# Patient Record
Sex: MALE | Race: WHITE | NOT HISPANIC OR LATINO | Employment: OTHER | URBAN - METROPOLITAN AREA
[De-identification: names, ages, dates, MRNs, and addresses within clinical notes are randomized per-mention and may not be internally consistent; named-entity substitution may affect disease eponyms.]

---

## 2018-01-11 NOTE — RESULT NOTES
Verified Results  (1) COMPREHENSIVE METABOLIC PANEL [id number] 46:29LE [facility] Order Number: [id number]     Test Name Result Flag Reference   GLUCOSE,RANDM 95 mg/dL     If the patient is fasting, the ADA then defines impaired fasting glucose as > 100 mg/dL and diabetes as > or equal to 123 mg/dL  SODIUM 137 mmol/L  136-145   POTASSIUM 4 7 mmol/L  3 5-5 3   CHLORIDE 103 mmol/L  100-108   CARBON DIOXIDE 29 mmol/L  21-32   ANION GAP (CALC) 5 mmol/L  4-13   BLOOD UREA NITROGEN 13 mg/dL  5-25   CREATININE 0 97 mg/dL  0 60-1 30   Standardized to IDMS reference method   CALCIUM 8 8 mg/dL  8 3-10 1   BILI, TOTAL 0 60 mg/dL  0 20-1 00   ALK PHOSPHATAS 125 U/L H    ALT (SGPT) 23 U/L  12-78   AST(SGOT) 14 U/L  5-45   ALBUMIN 3 6 g/dL  3 5-5 0   TOTAL PROTEIN 7 1 g/dL  6 4-8 2   eGFR Non-African American      >60 0 ml/min/1 73sq [facility] Disease Education Program recommendations are as follows:  GFR calculation is accurate only with a steady state creatinine  Chronic Kidney disease less than 60 ml/min/1 73 sq  meters  Kidney failure less than 15 ml/min/1 73 sq  meters       (1) CBC/PLT/DIFF [id number] 08:02AM [facility] Order Number: [id number]     Test Name Result Flag Reference   WBC COUNT 7 36 Thousand/uL  4 31-10 16   RBC COUNT 5 08 Million/uL  3 88-5 62   HEMOGLOBIN 15 3 g/dL  12 0-17 0   HEMATOCRIT 46 3 %  36 5-49 3   MCV 91 fL  82-98   MCH 30 1 pg  26 8-34 3   MCHC 33 0 g/dL  31 4-37 4   RDW 13 8 %  11 6-15 1   MPV 11 0 fL  8 9-12 7   PLATELET COUNT 526 Thousands/uL  149-390   nRBC AUTOMATED 0 /100 WBCs     NEUTROPHILS RELATIVE PERCENT 61 %  43-75   LYMPHOCYTES RELATIVE PERCENT 27 %  14-44   MONOCYTES RELATIVE PERCENT 11 %  4-12   EOSINOPHILS RELATIVE PERCENT 1 %  0-6   BASOPHILS RELATIVE PERCENT 0 %  0-1   NEUTROPHILS ABSOLUTE COUNT 4 49 Thousands/?L  1 85-7 62   LYMPHOCYTES ABSOLUTE COUNT 1 95 Thousands/?L  0 60-4 47   MONOCYTES ABSOLUTE COUNT 0 78 Thousand/?L  0 17-1 22   EOSINOPHILS ABSOLUTE COUNT 0 10 Thousand/?L  0 00-0 61   BASOPHILS ABSOLUTE COUNT 0 02 Thousands/?L  0 00-0 10     (1) LIPID PANEL FASTING W DIRECT LDL REFLEX 03Aug2016 08:02AM Mehran Camilla Order Number: [id number]     Test Name Result Flag Reference   CHOLESTEROL 173 mg/dL     LDL CHOLESTEROL CALCULATED 102 mg/dL H 0-100   Triglyceride:         Normal              <150 mg/dl       Borderline High    150-199 mg/dl       High               200-499 mg/dl       Very High          >499 mg/dl  Cholesterol:         Desirable        <200 mg/dl      Borderline High  200-239 mg/dl      High             >239 mg/dl  HDL Cholesterol:        High    >59 mg/dL      Low     <41 mg/dL  LDL Cholesterol:        Optimal          <100 mg/dl        Near Optimal     100-129 mg/dl        Above Optimal          Borderline High   130-159 mg/dl          High              160-189 mg/dl          Very High        >189 mg/dl  LDL CALCULATED:    This screening LDL is a calculated result  It does not have the accuracy of the Direct Measured LDL in the monitoring of patients with hyperlipidemia and/or statin therapy  Direct Measure LDL (NVH942) must be ordered separately in these patients  TRIGLYCERIDES 144 mg/dL  <=150   Specimen collection should occur prior to N-Acetylcysteine or Metamizole administration due to the potential for falsely depressed results  HDL,DIRECT 42 mg/dL  40-60   Specimen collection should occur prior to Metamizole administration due to the potential for falsely depressed results  (1) TSH 03Aug2016 08:02AM [facility] Order Number: [id number]     Test Name Result Flag Reference   TSH 1 240 uIU/mL  0 358-3 740   Patients undergoing fluorescein dye angiography may retain small amounts of fluorescein in the body for 48-72 hours post procedure  Samples containing fluorescein can produce falsely depressed TSH values   If the patient had this procedure,a specimen should be resubmitted post fluorescein clearance       (1) NT- BNP (PRO BRAIN NATRIURETIC PEPTIDE) [id number] 08:02AM Elijah Gell     Test Name Result Flag Reference   NT-PRO  pg/mL H <125

## 2018-04-19 DIAGNOSIS — I10 BENIGN ESSENTIAL HYPERTENSION: Primary | ICD-10-CM

## 2018-04-19 RX ORDER — LISINOPRIL 10 MG/1
TABLET ORAL
Qty: 90 TABLET | Refills: 1 | Status: SHIPPED | OUTPATIENT
Start: 2018-04-19 | End: 2018-08-15 | Stop reason: SDUPTHER

## 2018-07-24 DIAGNOSIS — E78.2 MIXED HYPERLIPIDEMIA: Primary | ICD-10-CM

## 2018-07-24 RX ORDER — SIMVASTATIN 40 MG
TABLET ORAL
Qty: 90 TABLET | Refills: 1 | Status: SHIPPED | OUTPATIENT
Start: 2018-07-24 | End: 2018-08-15 | Stop reason: SDUPTHER

## 2018-08-15 ENCOUNTER — OFFICE VISIT (OUTPATIENT)
Dept: FAMILY MEDICINE CLINIC | Facility: CLINIC | Age: 78
End: 2018-08-15
Payer: MEDICARE

## 2018-08-15 VITALS
HEART RATE: 76 BPM | WEIGHT: 275 LBS | DIASTOLIC BLOOD PRESSURE: 84 MMHG | RESPIRATION RATE: 32 BRPM | HEIGHT: 69 IN | BODY MASS INDEX: 40.73 KG/M2 | SYSTOLIC BLOOD PRESSURE: 122 MMHG

## 2018-08-15 DIAGNOSIS — H26.9 CATARACT OF BOTH EYES, UNSPECIFIED CATARACT TYPE: ICD-10-CM

## 2018-08-15 DIAGNOSIS — Z01.818 PREOPERATIVE EVALUATION OF A MEDICAL CONDITION TO RULE OUT SURGICAL CONTRAINDICATIONS (TAR REQUIRED): Primary | ICD-10-CM

## 2018-08-15 DIAGNOSIS — I10 ESSENTIAL HYPERTENSION: ICD-10-CM

## 2018-08-15 DIAGNOSIS — E78.2 MIXED HYPERLIPIDEMIA: ICD-10-CM

## 2018-08-15 DIAGNOSIS — I10 BENIGN ESSENTIAL HYPERTENSION: ICD-10-CM

## 2018-08-15 PROCEDURE — 99214 OFFICE O/P EST MOD 30 MIN: CPT | Performed by: PHYSICIAN ASSISTANT

## 2018-08-15 RX ORDER — SIMVASTATIN 40 MG
40 TABLET ORAL DAILY
Qty: 90 TABLET | Refills: 3 | Status: SHIPPED | OUTPATIENT
Start: 2018-08-15 | End: 2020-01-01

## 2018-08-15 RX ORDER — TAMSULOSIN HYDROCHLORIDE 0.4 MG/1
CAPSULE ORAL
COMMUNITY
End: 2018-08-15

## 2018-08-15 RX ORDER — FUROSEMIDE 40 MG/1
1 TABLET ORAL DAILY
COMMUNITY
Start: 2016-03-31 | End: 2018-08-15

## 2018-08-15 RX ORDER — LISINOPRIL 10 MG/1
10 TABLET ORAL DAILY
Qty: 90 TABLET | Refills: 3 | Status: SHIPPED | OUTPATIENT
Start: 2018-08-15 | End: 2020-01-01

## 2018-08-15 NOTE — PROGRESS NOTES
Assessment/Plan:  Patient Instructions   1  Preop consultation-EKG was performed because patient had a few skipped beats on auscultation  He did have EKG consistent with PVCs  He is physically stable at this time for procedure as planned with Dr Lakisha Porter on the 20th of August and the 5th of September  2  Cataract-stable for surgery as above  3   Hypertension-stable on lisinopril  Refill given  4   Hyperlipidemia-stable on simvastatin  Refill given  Diagnoses and all orders for this visit:    Preoperative evaluation of a medical condition to rule out surgical contraindications (TAR required)    Cataract of both eyes, unspecified cataract type    Essential hypertension    Mixed hyperlipidemia  -     simvastatin (ZOCOR) 40 mg tablet; Take 1 tablet (40 mg total) by mouth daily    Benign essential hypertension  -     lisinopril (ZESTRIL) 10 mg tablet; Take 1 tablet (10 mg total) by mouth daily          Subjective:   Preop clearance for cataract removal scheduled 8/20 right eye then 9/5  left eye  (+Urinary Incontinence)  [facility]     Patient ID: Jorge Graham is a 69 y o  male  HPI:  This is a 41-year-old gentleman that presents to the office for preop consultation for cataract surgeries to be performed on the 20th of August on the 5th of September  He states that he is feeling well without any symptoms of chest pain or shortness of breath  He has not had any palpitations and denies signs of infection  He has not had fevers, chills, coughing, or diarrhea  He has a history of hypertension which has been stable on lisinopril  His cholesterol has been stable on simvastatin  The following portions of the patient's history were reviewed and updated as appropriate: allergies, current medications, past family history, past medical history, past social history, past surgical history and problem list     Review of Systems   Constitutional: Negative for chills, fatigue and fever     HENT: Negative for congestion, ear pain and sinus pressure  Eyes: Negative for visual disturbance  Respiratory: Negative for cough, chest tightness and shortness of breath  Cardiovascular: Negative for chest pain and palpitations  Gastrointestinal: Negative for diarrhea, nausea and vomiting  Endocrine: Negative for polyuria  Genitourinary: Negative for dysuria and frequency  Musculoskeletal: Negative for arthralgias and myalgias  Skin: Negative for pallor and rash  Neurological: Negative for dizziness, weakness, light-headedness, numbness and headaches  Psychiatric/Behavioral: Negative for agitation, behavioral problems and sleep disturbance  All other systems reviewed and are negative  Objective:      /84   Pulse 76 Comment: irregular  Resp (!) 32   Ht 5' 9" (1 753 m)   Wt 125 kg (275 lb)   BMI 40 61 kg/m²          Physical Exam   Constitutional: He is oriented to person, place, and time  He appears well-developed and well-nourished  No distress  HENT:   Head: Normocephalic and atraumatic  Right Ear: External ear normal    Left Ear: External ear normal    Nose: Nose normal    Mouth/Throat: Oropharynx is clear and moist  No oropharyngeal exudate  Eyes: Conjunctivae and EOM are normal  Pupils are equal, round, and reactive to light  Neck: Normal range of motion  Neck supple  No tracheal deviation present  No thyromegaly present  Cardiovascular: Normal rate, regular rhythm and normal heart sounds  Exam reveals no friction rub  No murmur heard  There appears to be a few skipped beats on auscultation  However there will be several beats in a row that her with a normal rhythm  Pulmonary/Chest: Effort normal and breath sounds normal  No respiratory distress  He has no wheezes  He has no rales  Abdominal: Soft  Bowel sounds are normal  He exhibits no distension  There is no tenderness  There is no rebound and no guarding  Musculoskeletal: Normal range of motion   He exhibits no edema or tenderness  Lymphadenopathy:     He has no cervical adenopathy  Neurological: He is alert and oriented to person, place, and time  No cranial nerve deficit  Coordination normal    Skin: Skin is warm and dry  No rash noted  No erythema  Psychiatric: He has a normal mood and affect  His behavior is normal  Thought content normal    Nursing note and vitals reviewed

## 2018-08-15 NOTE — PATIENT INSTRUCTIONS
1  Preop consultation-EKG was performed because patient had a few skipped beats on auscultation  He did have EKG consistent with PVCs  He is physically stable at this time for procedure as planned with Dr Therese Ni on the 20th of August and the 5th of September  2  Cataract-stable for surgery as above  3   Hypertension-stable on lisinopril  Refill given  4   Hyperlipidemia-stable on simvastatin  Refill given

## 2020-01-01 ENCOUNTER — OFFICE VISIT (OUTPATIENT)
Dept: FAMILY MEDICINE CLINIC | Facility: CLINIC | Age: 80
End: 2020-01-01
Payer: MEDICARE

## 2020-01-01 ENCOUNTER — LAB (OUTPATIENT)
Dept: LAB | Facility: CLINIC | Age: 80
End: 2020-01-01

## 2020-01-01 ENCOUNTER — APPOINTMENT (OUTPATIENT)
Dept: RADIOLOGY | Facility: MEDICAL CENTER | Age: 80
End: 2020-01-01
Payer: MEDICARE

## 2020-01-01 VITALS
WEIGHT: 261 LBS | HEART RATE: 71 BPM | RESPIRATION RATE: 18 BRPM | TEMPERATURE: 97.5 F | OXYGEN SATURATION: 96 % | SYSTOLIC BLOOD PRESSURE: 152 MMHG | DIASTOLIC BLOOD PRESSURE: 90 MMHG | BODY MASS INDEX: 38.54 KG/M2

## 2020-01-01 VITALS
BODY MASS INDEX: 37.73 KG/M2 | DIASTOLIC BLOOD PRESSURE: 98 MMHG | TEMPERATURE: 97.6 F | SYSTOLIC BLOOD PRESSURE: 164 MMHG | WEIGHT: 255.5 LBS | HEART RATE: 68 BPM

## 2020-01-01 VITALS
HEIGHT: 70 IN | HEART RATE: 89 BPM | WEIGHT: 255 LBS | BODY MASS INDEX: 36.51 KG/M2 | TEMPERATURE: 96.3 F | DIASTOLIC BLOOD PRESSURE: 78 MMHG | SYSTOLIC BLOOD PRESSURE: 137 MMHG

## 2020-01-01 DIAGNOSIS — K63.5 BENIGN COLON POLYP: ICD-10-CM

## 2020-01-01 DIAGNOSIS — N30.00 ACUTE CYSTITIS WITHOUT HEMATURIA: Primary | ICD-10-CM

## 2020-01-01 DIAGNOSIS — E78.2 MIXED HYPERLIPIDEMIA: ICD-10-CM

## 2020-01-01 DIAGNOSIS — M16.12 PRIMARY OSTEOARTHRITIS OF LEFT HIP: ICD-10-CM

## 2020-01-01 DIAGNOSIS — N30.00 ACUTE CYSTITIS WITHOUT HEMATURIA: ICD-10-CM

## 2020-01-01 DIAGNOSIS — Z00.00 HEALTH CARE MAINTENANCE: ICD-10-CM

## 2020-01-01 DIAGNOSIS — N39.43 BENIGN PROSTATIC HYPERPLASIA WITH POST-VOID DRIBBLING: ICD-10-CM

## 2020-01-01 DIAGNOSIS — R74.8 ALKALINE PHOSPHATASE ELEVATION: ICD-10-CM

## 2020-01-01 DIAGNOSIS — N40.1 BENIGN PROSTATIC HYPERPLASIA WITH POST-VOID DRIBBLING: ICD-10-CM

## 2020-01-01 DIAGNOSIS — I10 ESSENTIAL HYPERTENSION: Primary | ICD-10-CM

## 2020-01-01 DIAGNOSIS — M25.559 HIP PAIN: ICD-10-CM

## 2020-01-01 DIAGNOSIS — E83.51 HYPOCALCEMIA: Primary | ICD-10-CM

## 2020-01-01 DIAGNOSIS — I10 ESSENTIAL HYPERTENSION: ICD-10-CM

## 2020-01-01 DIAGNOSIS — Z23 ENCOUNTER FOR IMMUNIZATION: ICD-10-CM

## 2020-01-01 DIAGNOSIS — M16.12 PRIMARY OSTEOARTHRITIS OF LEFT HIP: Primary | ICD-10-CM

## 2020-01-01 DIAGNOSIS — M16.0 PRIMARY OSTEOARTHRITIS OF BOTH HIPS: ICD-10-CM

## 2020-01-01 LAB
ALBUMIN SERPL BCP-MCNC: 3.6 G/DL (ref 3.5–5)
ALP SERPL-CCNC: 168 U/L (ref 46–116)
ALT SERPL W P-5'-P-CCNC: 16 U/L (ref 12–78)
AMORPH URATE CRY URNS QL MICRO: ABNORMAL /HPF
ANION GAP SERPL CALCULATED.3IONS-SCNC: 1 MMOL/L (ref 4–13)
AST SERPL W P-5'-P-CCNC: 9 U/L (ref 5–45)
BACTERIA UR CULT: ABNORMAL
BACTERIA UR QL AUTO: ABNORMAL /HPF
BILIRUB SERPL-MCNC: 1 MG/DL (ref 0.2–1)
BILIRUB UR QL STRIP: ABNORMAL
BUN SERPL-MCNC: 10 MG/DL (ref 5–25)
CALCIUM SERPL-MCNC: 7.7 MG/DL (ref 8.3–10.1)
CHLORIDE SERPL-SCNC: 104 MMOL/L (ref 100–108)
CHOLEST SERPL-MCNC: 172 MG/DL (ref 50–200)
CLARITY UR: ABNORMAL
CO2 SERPL-SCNC: 32 MMOL/L (ref 21–32)
COLOR UR: ABNORMAL
CREAT SERPL-MCNC: 0.94 MG/DL (ref 0.6–1.3)
ERYTHROCYTE [DISTWIDTH] IN BLOOD BY AUTOMATED COUNT: 13.8 % (ref 11.6–15.1)
GFR SERPL CREATININE-BSD FRML MDRD: 77 ML/MIN/1.73SQ M
GLUCOSE P FAST SERPL-MCNC: 82 MG/DL (ref 65–99)
GLUCOSE UR STRIP-MCNC: NEGATIVE MG/DL
HCT VFR BLD AUTO: 48.7 % (ref 36.5–49.3)
HDLC SERPL-MCNC: 42 MG/DL
HGB BLD-MCNC: 15.2 G/DL (ref 12–17)
HGB UR QL STRIP.AUTO: NEGATIVE
KETONES UR STRIP-MCNC: NEGATIVE MG/DL
LDLC SERPL CALC-MCNC: 104 MG/DL (ref 0–100)
LEUKOCYTE ESTERASE UR QL STRIP: NEGATIVE
MCH RBC QN AUTO: 28.9 PG (ref 26.8–34.3)
MCHC RBC AUTO-ENTMCNC: 31.2 G/DL (ref 31.4–37.4)
MCV RBC AUTO: 93 FL (ref 82–98)
NITRITE UR QL STRIP: NEGATIVE
NON-SQ EPI CELLS URNS QL MICRO: ABNORMAL /HPF
PH UR STRIP.AUTO: 6 [PH]
PLATELET # BLD AUTO: 217 THOUSANDS/UL (ref 149–390)
PMV BLD AUTO: 10.4 FL (ref 8.9–12.7)
POTASSIUM SERPL-SCNC: 4.1 MMOL/L (ref 3.5–5.3)
PROT SERPL-MCNC: 7.4 G/DL (ref 6.4–8.2)
PROT UR STRIP-MCNC: ABNORMAL MG/DL
PSA SERPL-MCNC: 3.5 NG/ML (ref 0–4)
RBC # BLD AUTO: 5.26 MILLION/UL (ref 3.88–5.62)
RBC #/AREA URNS AUTO: ABNORMAL /HPF
SODIUM SERPL-SCNC: 137 MMOL/L (ref 136–145)
SP GR UR STRIP.AUTO: 1.03 (ref 1–1.03)
TRIGL SERPL-MCNC: 129 MG/DL
TSH SERPL DL<=0.05 MIU/L-ACNC: 1.32 UIU/ML (ref 0.36–3.74)
UROBILINOGEN UR QL STRIP.AUTO: 1 E.U./DL
WBC # BLD AUTO: 7.03 THOUSAND/UL (ref 4.31–10.16)
WBC #/AREA URNS AUTO: ABNORMAL /HPF

## 2020-01-01 PROCEDURE — 99213 OFFICE O/P EST LOW 20 MIN: CPT | Performed by: ORTHOPAEDIC SURGERY

## 2020-01-01 PROCEDURE — 84153 ASSAY OF PSA TOTAL: CPT

## 2020-01-01 PROCEDURE — 81001 URINALYSIS AUTO W/SCOPE: CPT

## 2020-01-01 PROCEDURE — 36415 COLL VENOUS BLD VENIPUNCTURE: CPT

## 2020-01-01 PROCEDURE — 84443 ASSAY THYROID STIM HORMONE: CPT

## 2020-01-01 PROCEDURE — 99214 OFFICE O/P EST MOD 30 MIN: CPT | Performed by: FAMILY MEDICINE

## 2020-01-01 PROCEDURE — G0008 ADMIN INFLUENZA VIRUS VAC: HCPCS | Performed by: FAMILY MEDICINE

## 2020-01-01 PROCEDURE — 73502 X-RAY EXAM HIP UNI 2-3 VIEWS: CPT

## 2020-01-01 PROCEDURE — 90662 IIV NO PRSV INCREASED AG IM: CPT | Performed by: FAMILY MEDICINE

## 2020-01-01 PROCEDURE — 80053 COMPREHEN METABOLIC PANEL: CPT

## 2020-01-01 PROCEDURE — 85027 COMPLETE CBC AUTOMATED: CPT

## 2020-01-01 PROCEDURE — 90732 PPSV23 VACC 2 YRS+ SUBQ/IM: CPT | Performed by: FAMILY MEDICINE

## 2020-01-01 PROCEDURE — G0009 ADMIN PNEUMOCOCCAL VACCINE: HCPCS | Performed by: FAMILY MEDICINE

## 2020-01-01 PROCEDURE — G0439 PPPS, SUBSEQ VISIT: HCPCS | Performed by: FAMILY MEDICINE

## 2020-01-01 PROCEDURE — 87086 URINE CULTURE/COLONY COUNT: CPT

## 2020-01-01 PROCEDURE — 80061 LIPID PANEL: CPT

## 2020-01-01 RX ORDER — ACETAMINOPHEN 325 MG/1
650 TABLET ORAL EVERY 6 HOURS PRN
COMMUNITY
End: 2021-01-01 | Stop reason: HOSPADM

## 2020-01-01 RX ORDER — NITROFURANTOIN 25; 75 MG/1; MG/1
CAPSULE ORAL
Qty: 14 CAPSULE | Refills: 0 | Status: SHIPPED | OUTPATIENT
Start: 2020-01-01 | End: 2021-01-01 | Stop reason: HOSPADM

## 2020-01-01 RX ORDER — TAMSULOSIN HYDROCHLORIDE 0.4 MG/1
CAPSULE ORAL
COMMUNITY
End: 2020-01-01

## 2020-01-01 RX ORDER — TERAZOSIN 5 MG/1
5 CAPSULE ORAL
Qty: 30 CAPSULE | Refills: 5 | Status: SHIPPED | OUTPATIENT
Start: 2020-01-01 | End: 2021-01-01 | Stop reason: HOSPADM

## 2020-01-01 RX ORDER — CALCIUM CARBONATE 200(500)MG
2 TABLET,CHEWABLE ORAL DAILY
COMMUNITY
End: 2021-01-01 | Stop reason: HOSPADM

## 2020-01-01 RX ORDER — TERAZOSIN 2 MG/1
2 CAPSULE ORAL
Qty: 30 CAPSULE | Refills: 5 | Status: SHIPPED | OUTPATIENT
Start: 2020-01-01 | End: 2020-01-01 | Stop reason: SDUPTHER

## 2020-10-14 PROBLEM — Z00.00 HEALTH CARE MAINTENANCE: Status: ACTIVE | Noted: 2020-01-01

## 2020-10-14 PROBLEM — N39.43 BENIGN PROSTATIC HYPERPLASIA WITH POST-VOID DRIBBLING: Status: ACTIVE | Noted: 2020-01-01

## 2020-10-14 PROBLEM — N40.1 BENIGN PROSTATIC HYPERPLASIA WITH POST-VOID DRIBBLING: Status: ACTIVE | Noted: 2020-01-01

## 2020-10-17 PROBLEM — N30.00 ACUTE CYSTITIS WITHOUT HEMATURIA: Status: ACTIVE | Noted: 2020-01-01

## 2020-10-28 PROBLEM — E83.51 HYPOCALCEMIA: Status: ACTIVE | Noted: 2020-01-01

## 2020-10-28 PROBLEM — R74.8 ALKALINE PHOSPHATASE ELEVATION: Status: ACTIVE | Noted: 2020-01-01

## 2021-01-01 ENCOUNTER — APPOINTMENT (INPATIENT)
Dept: RADIOLOGY | Facility: HOSPITAL | Age: 81
DRG: 871 | End: 2021-01-01
Payer: COMMERCIAL

## 2021-01-01 ENCOUNTER — OFFICE VISIT (OUTPATIENT)
Dept: OBGYN CLINIC | Facility: MEDICAL CENTER | Age: 81
End: 2021-01-01
Payer: COMMERCIAL

## 2021-01-01 ENCOUNTER — APPOINTMENT (INPATIENT)
Dept: NON INVASIVE DIAGNOSTICS | Facility: HOSPITAL | Age: 81
DRG: 871 | End: 2021-01-01
Payer: COMMERCIAL

## 2021-01-01 ENCOUNTER — HOSPITAL ENCOUNTER (OUTPATIENT)
Dept: RADIOLOGY | Facility: HOSPITAL | Age: 81
Discharge: HOME | End: 2021-05-14
Payer: COMMERCIAL

## 2021-01-01 ENCOUNTER — APPOINTMENT (INPATIENT)
Dept: RADIOLOGY | Facility: HOSPITAL | Age: 81
DRG: 871 | End: 2021-01-01
Attending: STUDENT IN AN ORGANIZED HEALTH CARE EDUCATION/TRAINING PROGRAM
Payer: COMMERCIAL

## 2021-01-01 ENCOUNTER — TELEPHONE (OUTPATIENT)
Dept: RADIOLOGY | Facility: HOSPITAL | Age: 81
End: 2021-01-01

## 2021-01-01 ENCOUNTER — OFFICE VISIT (OUTPATIENT)
Dept: FAMILY MEDICINE CLINIC | Facility: CLINIC | Age: 81
End: 2021-01-01
Payer: COMMERCIAL

## 2021-01-01 ENCOUNTER — HOSPITAL ENCOUNTER (INPATIENT)
Facility: HOSPITAL | Age: 81
LOS: 8 days | DRG: 871 | End: 2021-09-08
Attending: EMERGENCY MEDICINE | Admitting: STUDENT IN AN ORGANIZED HEALTH CARE EDUCATION/TRAINING PROGRAM
Payer: COMMERCIAL

## 2021-01-01 ENCOUNTER — APPOINTMENT (EMERGENCY)
Dept: RADIOLOGY | Facility: HOSPITAL | Age: 81
DRG: 871 | End: 2021-01-01
Payer: COMMERCIAL

## 2021-01-01 VITALS
TEMPERATURE: 98.9 F | HEIGHT: 70 IN | OXYGEN SATURATION: 90 % | DIASTOLIC BLOOD PRESSURE: 74 MMHG | SYSTOLIC BLOOD PRESSURE: 155 MMHG | RESPIRATION RATE: 22 BRPM | BODY MASS INDEX: 40.94 KG/M2 | WEIGHT: 286 LBS | HEART RATE: 92 BPM

## 2021-01-01 VITALS
BODY MASS INDEX: 40.52 KG/M2 | HEART RATE: 67 BPM | TEMPERATURE: 97.8 F | WEIGHT: 283 LBS | DIASTOLIC BLOOD PRESSURE: 87 MMHG | HEIGHT: 70 IN | SYSTOLIC BLOOD PRESSURE: 151 MMHG

## 2021-01-01 VITALS
HEART RATE: 80 BPM | TEMPERATURE: 98.2 F | SYSTOLIC BLOOD PRESSURE: 156 MMHG | WEIGHT: 283 LBS | BODY MASS INDEX: 40.52 KG/M2 | DIASTOLIC BLOOD PRESSURE: 80 MMHG | HEIGHT: 70 IN

## 2021-01-01 DIAGNOSIS — I63.9 STROKE (CEREBRUM) (HCC): Primary | ICD-10-CM

## 2021-01-01 DIAGNOSIS — R55 SYNCOPE: ICD-10-CM

## 2021-01-01 DIAGNOSIS — I63.81 BASAL GANGLIA STROKE (HCC): ICD-10-CM

## 2021-01-01 DIAGNOSIS — E78.2 MIXED HYPERLIPIDEMIA: ICD-10-CM

## 2021-01-01 DIAGNOSIS — M16.12 PRIMARY OSTEOARTHRITIS OF LEFT HIP: Primary | ICD-10-CM

## 2021-01-01 DIAGNOSIS — R06.02 SHORTNESS OF BREATH: ICD-10-CM

## 2021-01-01 DIAGNOSIS — I63.9 THROMBOEMBOLIC STROKE (HCC): ICD-10-CM

## 2021-01-01 DIAGNOSIS — N40.1 BENIGN PROSTATIC HYPERPLASIA WITH POST-VOID DRIBBLING: ICD-10-CM

## 2021-01-01 DIAGNOSIS — R77.8 ELEVATED TROPONIN: ICD-10-CM

## 2021-01-01 DIAGNOSIS — J39.8 TRACHEOMALACIA: ICD-10-CM

## 2021-01-01 DIAGNOSIS — R53.1 LEFT-SIDED WEAKNESS: ICD-10-CM

## 2021-01-01 DIAGNOSIS — J96.00 ACUTE RESPIRATORY FAILURE, UNSPECIFIED WHETHER WITH HYPOXIA OR HYPERCAPNIA (HCC): ICD-10-CM

## 2021-01-01 DIAGNOSIS — N39.43 BENIGN PROSTATIC HYPERPLASIA WITH POST-VOID DRIBBLING: ICD-10-CM

## 2021-01-01 DIAGNOSIS — M16.0 PRIMARY OSTEOARTHRITIS OF BOTH HIPS: ICD-10-CM

## 2021-01-01 DIAGNOSIS — I10 ESSENTIAL HYPERTENSION: Primary | ICD-10-CM

## 2021-01-01 LAB
ALBUMIN SERPL BCP-MCNC: 2.8 G/DL (ref 3.5–5)
ALBUMIN SERPL BCP-MCNC: 3.4 G/DL (ref 3.5–5)
ALP SERPL-CCNC: 144 U/L (ref 46–116)
ALP SERPL-CCNC: 88 U/L (ref 46–116)
ALT SERPL W P-5'-P-CCNC: 18 U/L (ref 12–78)
ALT SERPL W P-5'-P-CCNC: 20 U/L (ref 12–78)
ANION GAP SERPL CALCULATED.3IONS-SCNC: 1 MMOL/L (ref 4–13)
ANION GAP SERPL CALCULATED.3IONS-SCNC: 1 MMOL/L (ref 4–13)
ANION GAP SERPL CALCULATED.3IONS-SCNC: 3 MMOL/L (ref 4–13)
ANION GAP SERPL CALCULATED.3IONS-SCNC: 7 MMOL/L (ref 4–13)
ANION GAP SERPL CALCULATED.3IONS-SCNC: 8 MMOL/L (ref 4–13)
APTT PPP: 30 SECONDS (ref 23–37)
ARTERIAL PATENCY WRIST A: YES
AST SERPL W P-5'-P-CCNC: 25 U/L (ref 5–45)
AST SERPL W P-5'-P-CCNC: 27 U/L (ref 5–45)
ATRIAL RATE: 66 BPM
ATRIAL RATE: 78 BPM
ATRIAL RATE: 81 BPM
ATRIAL RATE: 97 BPM
BASE EX.OXY STD BLDV CALC-SCNC: 86.2 % (ref 60–80)
BASE EX.OXY STD BLDV CALC-SCNC: 94.4 % (ref 60–80)
BASE EX.OXY STD BLDV CALC-SCNC: 94.4 % (ref 60–80)
BASE EXCESS BLDA CALC-SCNC: 0.6 MMOL/L
BASE EXCESS BLDA CALC-SCNC: 2 MMOL/L (ref -2–3)
BASE EXCESS BLDA CALC-SCNC: 2.1 MMOL/L
BASE EXCESS BLDV CALC-SCNC: -0.6 MMOL/L
BASE EXCESS BLDV CALC-SCNC: -2.8 MMOL/L
BASE EXCESS BLDV CALC-SCNC: 5 MMOL/L
BASOPHILS # BLD AUTO: 0.01 THOUSANDS/ΜL (ref 0–0.1)
BASOPHILS # BLD AUTO: 0.02 THOUSANDS/ΜL (ref 0–0.1)
BASOPHILS # BLD AUTO: 0.03 THOUSANDS/ΜL (ref 0–0.1)
BASOPHILS # BLD AUTO: 0.05 THOUSANDS/ΜL (ref 0–0.1)
BASOPHILS NFR BLD AUTO: 0 % (ref 0–1)
BILIRUB DIRECT SERPL-MCNC: 0.26 MG/DL (ref 0–0.2)
BILIRUB SERPL-MCNC: 0.73 MG/DL (ref 0.2–1)
BILIRUB SERPL-MCNC: 0.86 MG/DL (ref 0.2–1)
BUN SERPL-MCNC: 19 MG/DL (ref 5–25)
BUN SERPL-MCNC: 23 MG/DL (ref 5–25)
BUN SERPL-MCNC: 27 MG/DL (ref 5–25)
BUN SERPL-MCNC: 31 MG/DL (ref 5–25)
BUN SERPL-MCNC: 33 MG/DL (ref 5–25)
BUN SERPL-MCNC: 34 MG/DL (ref 5–25)
BUN SERPL-MCNC: 35 MG/DL (ref 5–25)
BUN SERPL-MCNC: 35 MG/DL (ref 5–25)
BUN SERPL-MCNC: 36 MG/DL (ref 5–25)
BUN SERPL-MCNC: 37 MG/DL (ref 5–25)
CA-I BLD-SCNC: 1.12 MMOL/L (ref 1.12–1.32)
CALCIUM ALBUM COR SERPL-MCNC: 9.1 MG/DL (ref 8.3–10.1)
CALCIUM SERPL-MCNC: 7.6 MG/DL (ref 8.3–10.1)
CALCIUM SERPL-MCNC: 7.6 MG/DL (ref 8.3–10.1)
CALCIUM SERPL-MCNC: 7.8 MG/DL (ref 8.3–10.1)
CALCIUM SERPL-MCNC: 8.2 MG/DL (ref 8.3–10.1)
CALCIUM SERPL-MCNC: 8.3 MG/DL (ref 8.3–10.1)
CALCIUM SERPL-MCNC: 8.6 MG/DL (ref 8.3–10.1)
CALCIUM SERPL-MCNC: 8.7 MG/DL (ref 8.3–10.1)
CALCIUM SERPL-MCNC: 8.9 MG/DL (ref 8.3–10.1)
CHLORIDE SERPL-SCNC: 107 MMOL/L (ref 100–108)
CHLORIDE SERPL-SCNC: 110 MMOL/L (ref 100–108)
CHLORIDE SERPL-SCNC: 110 MMOL/L (ref 100–108)
CHLORIDE SERPL-SCNC: 111 MMOL/L (ref 100–108)
CHLORIDE SERPL-SCNC: 111 MMOL/L (ref 100–108)
CHLORIDE SERPL-SCNC: 112 MMOL/L (ref 100–108)
CHLORIDE SERPL-SCNC: 113 MMOL/L (ref 100–108)
CHLORIDE SERPL-SCNC: 114 MMOL/L (ref 100–108)
CHOLEST SERPL-MCNC: 219 MG/DL (ref 50–200)
CO2 SERPL-SCNC: 22 MMOL/L (ref 21–32)
CO2 SERPL-SCNC: 24 MMOL/L (ref 21–32)
CO2 SERPL-SCNC: 24 MMOL/L (ref 21–32)
CO2 SERPL-SCNC: 25 MMOL/L (ref 21–32)
CO2 SERPL-SCNC: 27 MMOL/L (ref 21–32)
CO2 SERPL-SCNC: 27 MMOL/L (ref 21–32)
CO2 SERPL-SCNC: 29 MMOL/L (ref 21–32)
CO2 SERPL-SCNC: 29 MMOL/L (ref 21–32)
CO2 SERPL-SCNC: 31 MMOL/L (ref 21–32)
CO2 SERPL-SCNC: 32 MMOL/L (ref 21–32)
CREAT SERPL-MCNC: 0.73 MG/DL (ref 0.6–1.3)
CREAT SERPL-MCNC: 0.74 MG/DL (ref 0.6–1.3)
CREAT SERPL-MCNC: 0.79 MG/DL (ref 0.6–1.3)
CREAT SERPL-MCNC: 0.85 MG/DL (ref 0.6–1.3)
CREAT SERPL-MCNC: 0.89 MG/DL (ref 0.6–1.3)
CREAT SERPL-MCNC: 0.91 MG/DL (ref 0.6–1.3)
CREAT SERPL-MCNC: 0.98 MG/DL (ref 0.6–1.3)
CREAT SERPL-MCNC: 0.98 MG/DL (ref 0.6–1.3)
CREAT SERPL-MCNC: 1.06 MG/DL (ref 0.6–1.3)
CREAT SERPL-MCNC: 1.07 MG/DL (ref 0.6–1.3)
D DIMER PPP FEU-MCNC: 0.71 UG/ML FEU
EOSINOPHIL # BLD AUTO: 0 THOUSAND/ΜL (ref 0–0.61)
EOSINOPHIL # BLD AUTO: 0.01 THOUSAND/ΜL (ref 0–0.61)
EOSINOPHIL NFR BLD AUTO: 0 % (ref 0–6)
ERYTHROCYTE [DISTWIDTH] IN BLOOD BY AUTOMATED COUNT: 14.1 % (ref 11.6–15.1)
ERYTHROCYTE [DISTWIDTH] IN BLOOD BY AUTOMATED COUNT: 14.6 % (ref 11.6–15.1)
ERYTHROCYTE [DISTWIDTH] IN BLOOD BY AUTOMATED COUNT: 14.9 % (ref 11.6–15.1)
ERYTHROCYTE [DISTWIDTH] IN BLOOD BY AUTOMATED COUNT: 15 % (ref 11.6–15.1)
ERYTHROCYTE [DISTWIDTH] IN BLOOD BY AUTOMATED COUNT: 15.1 % (ref 11.6–15.1)
EST. AVERAGE GLUCOSE BLD GHB EST-MCNC: 120 MG/DL
FIO2 GAS DIL.REBREATH: 29 L
GFR SERPL CREATININE-BSD FRML MDRD: 66 ML/MIN/1.73SQ M
GFR SERPL CREATININE-BSD FRML MDRD: 66 ML/MIN/1.73SQ M
GFR SERPL CREATININE-BSD FRML MDRD: 73 ML/MIN/1.73SQ M
GFR SERPL CREATININE-BSD FRML MDRD: 73 ML/MIN/1.73SQ M
GFR SERPL CREATININE-BSD FRML MDRD: 80 ML/MIN/1.73SQ M
GFR SERPL CREATININE-BSD FRML MDRD: 81 ML/MIN/1.73SQ M
GFR SERPL CREATININE-BSD FRML MDRD: 83 ML/MIN/1.73SQ M
GFR SERPL CREATININE-BSD FRML MDRD: 85 ML/MIN/1.73SQ M
GFR SERPL CREATININE-BSD FRML MDRD: 88 ML/MIN/1.73SQ M
GFR SERPL CREATININE-BSD FRML MDRD: 88 ML/MIN/1.73SQ M
GLUCOSE SERPL-MCNC: 100 MG/DL (ref 65–140)
GLUCOSE SERPL-MCNC: 102 MG/DL (ref 65–140)
GLUCOSE SERPL-MCNC: 104 MG/DL (ref 65–140)
GLUCOSE SERPL-MCNC: 109 MG/DL (ref 65–140)
GLUCOSE SERPL-MCNC: 112 MG/DL (ref 65–140)
GLUCOSE SERPL-MCNC: 116 MG/DL (ref 65–140)
GLUCOSE SERPL-MCNC: 117 MG/DL (ref 65–140)
GLUCOSE SERPL-MCNC: 117 MG/DL (ref 65–140)
GLUCOSE SERPL-MCNC: 118 MG/DL (ref 65–140)
GLUCOSE SERPL-MCNC: 120 MG/DL (ref 65–140)
GLUCOSE SERPL-MCNC: 120 MG/DL (ref 65–140)
GLUCOSE SERPL-MCNC: 121 MG/DL (ref 65–140)
GLUCOSE SERPL-MCNC: 123 MG/DL (ref 65–140)
GLUCOSE SERPL-MCNC: 124 MG/DL (ref 65–140)
GLUCOSE SERPL-MCNC: 124 MG/DL (ref 65–140)
GLUCOSE SERPL-MCNC: 125 MG/DL (ref 65–140)
GLUCOSE SERPL-MCNC: 127 MG/DL (ref 65–140)
GLUCOSE SERPL-MCNC: 130 MG/DL (ref 65–140)
GLUCOSE SERPL-MCNC: 130 MG/DL (ref 65–140)
GLUCOSE SERPL-MCNC: 131 MG/DL (ref 65–140)
GLUCOSE SERPL-MCNC: 131 MG/DL (ref 65–140)
GLUCOSE SERPL-MCNC: 132 MG/DL (ref 65–140)
GLUCOSE SERPL-MCNC: 134 MG/DL (ref 65–140)
GLUCOSE SERPL-MCNC: 136 MG/DL (ref 65–140)
GLUCOSE SERPL-MCNC: 142 MG/DL (ref 65–140)
GLUCOSE SERPL-MCNC: 145 MG/DL (ref 65–140)
GLUCOSE SERPL-MCNC: 151 MG/DL (ref 65–140)
GLUCOSE SERPL-MCNC: 155 MG/DL (ref 65–140)
GLUCOSE SERPL-MCNC: 168 MG/DL (ref 65–140)
GLUCOSE SERPL-MCNC: 195 MG/DL (ref 65–140)
GLUCOSE SERPL-MCNC: 336 MG/DL (ref 65–140)
GLUCOSE SERPL-MCNC: 96 MG/DL (ref 65–140)
HBA1C MFR BLD: 5.8 %
HCO3 BLDA-SCNC: 24.8 MMOL/L (ref 22–28)
HCO3 BLDA-SCNC: 26.4 MMOL/L (ref 24–30)
HCO3 BLDA-SCNC: 26.8 MMOL/L (ref 22–28)
HCO3 BLDV-SCNC: 21 MMOL/L (ref 24–30)
HCO3 BLDV-SCNC: 23.9 MMOL/L (ref 24–30)
HCO3 BLDV-SCNC: 29.3 MMOL/L (ref 24–30)
HCT VFR BLD AUTO: 40.3 % (ref 36.5–49.3)
HCT VFR BLD AUTO: 41.5 % (ref 36.5–49.3)
HCT VFR BLD AUTO: 44.1 % (ref 36.5–49.3)
HCT VFR BLD AUTO: 45 % (ref 36.5–49.3)
HCT VFR BLD AUTO: 45.1 % (ref 36.5–49.3)
HCT VFR BLD AUTO: 45.2 % (ref 36.5–49.3)
HCT VFR BLD AUTO: 46.2 % (ref 36.5–49.3)
HCT VFR BLD CALC: 42 % (ref 36.5–49.3)
HDLC SERPL-MCNC: 52 MG/DL
HGB BLD-MCNC: 12.7 G/DL (ref 12–17)
HGB BLD-MCNC: 13.2 G/DL (ref 12–17)
HGB BLD-MCNC: 14.4 G/DL (ref 12–17)
HGB BLD-MCNC: 14.5 G/DL (ref 12–17)
HGB BLD-MCNC: 14.5 G/DL (ref 12–17)
HGB BLD-MCNC: 14.7 G/DL (ref 12–17)
HGB BLD-MCNC: 14.9 G/DL (ref 12–17)
HGB BLDA-MCNC: 14.3 G/DL (ref 12–17)
HOROWITZ INDEX BLDA+IHG-RTO: 50 MM[HG]
IMM GRANULOCYTES # BLD AUTO: 0.04 THOUSAND/UL (ref 0–0.2)
IMM GRANULOCYTES # BLD AUTO: 0.09 THOUSAND/UL (ref 0–0.2)
IMM GRANULOCYTES # BLD AUTO: 0.13 THOUSAND/UL (ref 0–0.2)
IMM GRANULOCYTES # BLD AUTO: 0.17 THOUSAND/UL (ref 0–0.2)
IMM GRANULOCYTES NFR BLD AUTO: 0 % (ref 0–2)
IMM GRANULOCYTES NFR BLD AUTO: 1 % (ref 0–2)
INR PPP: 1.08 (ref 0.84–1.19)
IPAP: 16
IPAP: 16
LDLC SERPL CALC-MCNC: 151 MG/DL (ref 0–100)
LYMPHOCYTES # BLD AUTO: 0.41 THOUSANDS/ΜL (ref 0.6–4.47)
LYMPHOCYTES # BLD AUTO: 0.76 THOUSANDS/ΜL (ref 0.6–4.47)
LYMPHOCYTES # BLD AUTO: 0.78 THOUSANDS/ΜL (ref 0.6–4.47)
LYMPHOCYTES # BLD AUTO: 0.91 THOUSANDS/ΜL (ref 0.6–4.47)
LYMPHOCYTES NFR BLD AUTO: 3 % (ref 14–44)
LYMPHOCYTES NFR BLD AUTO: 5 % (ref 14–44)
LYMPHOCYTES NFR BLD AUTO: 6 % (ref 14–44)
LYMPHOCYTES NFR BLD AUTO: 8 % (ref 14–44)
MAGNESIUM SERPL-MCNC: 2.6 MG/DL (ref 1.6–2.6)
MAGNESIUM SERPL-MCNC: 2.8 MG/DL (ref 1.6–2.6)
MAGNESIUM SERPL-MCNC: 2.9 MG/DL (ref 1.6–2.6)
MAGNESIUM SERPL-MCNC: 3 MG/DL (ref 1.6–2.6)
MAGNESIUM SERPL-MCNC: 3.2 MG/DL (ref 1.6–2.6)
MCH RBC QN AUTO: 29.5 PG (ref 26.8–34.3)
MCH RBC QN AUTO: 29.5 PG (ref 26.8–34.3)
MCH RBC QN AUTO: 29.7 PG (ref 26.8–34.3)
MCH RBC QN AUTO: 29.8 PG (ref 26.8–34.3)
MCH RBC QN AUTO: 29.9 PG (ref 26.8–34.3)
MCH RBC QN AUTO: 30.3 PG (ref 26.8–34.3)
MCH RBC QN AUTO: 30.4 PG (ref 26.8–34.3)
MCHC RBC AUTO-ENTMCNC: 31.2 G/DL (ref 31.4–37.4)
MCHC RBC AUTO-ENTMCNC: 31.5 G/DL (ref 31.4–37.4)
MCHC RBC AUTO-ENTMCNC: 31.8 G/DL (ref 31.4–37.4)
MCHC RBC AUTO-ENTMCNC: 32.1 G/DL (ref 31.4–37.4)
MCHC RBC AUTO-ENTMCNC: 32.6 G/DL (ref 31.4–37.4)
MCHC RBC AUTO-ENTMCNC: 32.9 G/DL (ref 31.4–37.4)
MCHC RBC AUTO-ENTMCNC: 33.1 G/DL (ref 31.4–37.4)
MCV RBC AUTO: 90 FL (ref 82–98)
MCV RBC AUTO: 91 FL (ref 82–98)
MCV RBC AUTO: 92 FL (ref 82–98)
MCV RBC AUTO: 94 FL (ref 82–98)
MCV RBC AUTO: 94 FL (ref 82–98)
MCV RBC AUTO: 95 FL (ref 82–98)
MCV RBC AUTO: 95 FL (ref 82–98)
MONOCYTES # BLD AUTO: 0.88 THOUSAND/ΜL (ref 0.17–1.22)
MONOCYTES # BLD AUTO: 0.95 THOUSAND/ΜL (ref 0.17–1.22)
MONOCYTES # BLD AUTO: 1.36 THOUSAND/ΜL (ref 0.17–1.22)
MONOCYTES # BLD AUTO: 1.69 THOUSAND/ΜL (ref 0.17–1.22)
MONOCYTES NFR BLD AUTO: 12 % (ref 4–12)
MONOCYTES NFR BLD AUTO: 12 % (ref 4–12)
MONOCYTES NFR BLD AUTO: 6 % (ref 4–12)
MONOCYTES NFR BLD AUTO: 8 % (ref 4–12)
NEUTROPHILS # BLD AUTO: 10.65 THOUSANDS/ΜL (ref 1.85–7.62)
NEUTROPHILS # BLD AUTO: 11.96 THOUSANDS/ΜL (ref 1.85–7.62)
NEUTROPHILS # BLD AUTO: 12.56 THOUSANDS/ΜL (ref 1.85–7.62)
NEUTROPHILS # BLD AUTO: 8.9 THOUSANDS/ΜL (ref 1.85–7.62)
NEUTS SEG NFR BLD AUTO: 79 % (ref 43–75)
NEUTS SEG NFR BLD AUTO: 82 % (ref 43–75)
NEUTS SEG NFR BLD AUTO: 86 % (ref 43–75)
NEUTS SEG NFR BLD AUTO: 90 % (ref 43–75)
NON VENT- BIPAP: ABNORMAL
NON VENT- BIPAP: ABNORMAL
NONHDLC SERPL-MCNC: 167 MG/DL
NRBC BLD AUTO-RTO: 0 /100 WBCS
NT-PROBNP SERPL-MCNC: 673 PG/ML
O2 CT BLDA-SCNC: 20 ML/DL (ref 16–23)
O2 CT BLDA-SCNC: 20.1 ML/DL (ref 16–23)
O2 CT BLDV-SCNC: 18.9 ML/DL
O2 CT BLDV-SCNC: 19 ML/DL
O2 CT BLDV-SCNC: 20.3 ML/DL
OXYHGB MFR BLDA: 97.4 % (ref 94–97)
OXYHGB MFR BLDA: 98.2 % (ref 94–97)
P AXIS: -80 DEGREES
P AXIS: 178 DEGREES
P AXIS: 82 DEGREES
PCO2 BLD: 28 MMOL/L (ref 21–32)
PCO2 BLD: 37.7 MM HG (ref 42–50)
PCO2 BLDA: 38.4 MM HG (ref 36–44)
PCO2 BLDA: 42.3 MM HG (ref 36–44)
PCO2 BLDV: 34.2 MM HG (ref 42–50)
PCO2 BLDV: 38.9 MM HG (ref 42–50)
PCO2 BLDV: 42.3 MM HG (ref 42–50)
PEEP MAX SETTING VENT: 6 CM[H2O]
PEEP MAX SETTING VENT: 6 CM[H2O]
PEEP RESPIRATORY: 6 CM[H2O]
PH BLD: 7.45 [PH] (ref 7.3–7.4)
PH BLDA: 7.42 [PH] (ref 7.35–7.45)
PH BLDA: 7.43 [PH] (ref 7.35–7.45)
PH BLDV: 7.41 [PH] (ref 7.3–7.4)
PH BLDV: 7.41 [PH] (ref 7.3–7.4)
PH BLDV: 7.46 [PH] (ref 7.3–7.4)
PHOSPHATE SERPL-MCNC: 2.3 MG/DL (ref 2.3–4.1)
PHOSPHATE SERPL-MCNC: 2.5 MG/DL (ref 2.3–4.1)
PHOSPHATE SERPL-MCNC: 2.7 MG/DL (ref 2.3–4.1)
PHOSPHATE SERPL-MCNC: 2.9 MG/DL (ref 2.3–4.1)
PHOSPHATE SERPL-MCNC: 3 MG/DL (ref 2.3–4.1)
PLATELET # BLD AUTO: 183 THOUSANDS/UL (ref 149–390)
PLATELET # BLD AUTO: 221 THOUSANDS/UL (ref 149–390)
PLATELET # BLD AUTO: 224 THOUSANDS/UL (ref 149–390)
PLATELET # BLD AUTO: 236 THOUSANDS/UL (ref 149–390)
PLATELET # BLD AUTO: 256 THOUSANDS/UL (ref 149–390)
PLATELET # BLD AUTO: 264 THOUSANDS/UL (ref 149–390)
PLATELET # BLD AUTO: 273 THOUSANDS/UL (ref 149–390)
PMV BLD AUTO: 10.3 FL (ref 8.9–12.7)
PMV BLD AUTO: 10.4 FL (ref 8.9–12.7)
PMV BLD AUTO: 10.4 FL (ref 8.9–12.7)
PMV BLD AUTO: 10.5 FL (ref 8.9–12.7)
PMV BLD AUTO: 11.1 FL (ref 8.9–12.7)
PO2 BLD: 49 MM HG (ref 35–45)
PO2 BLDA: 110.5 MM HG (ref 75–129)
PO2 BLDA: 145.6 MM HG (ref 75–129)
PO2 BLDV: 54.9 MM HG (ref 35–45)
PO2 BLDV: 73.4 MM HG (ref 35–45)
PO2 BLDV: 78.2 MM HG (ref 35–45)
POTASSIUM BLD-SCNC: 3.9 MMOL/L (ref 3.5–5.3)
POTASSIUM SERPL-SCNC: 3.4 MMOL/L (ref 3.5–5.3)
POTASSIUM SERPL-SCNC: 3.6 MMOL/L (ref 3.5–5.3)
POTASSIUM SERPL-SCNC: 3.7 MMOL/L (ref 3.5–5.3)
POTASSIUM SERPL-SCNC: 3.8 MMOL/L (ref 3.5–5.3)
POTASSIUM SERPL-SCNC: 3.9 MMOL/L (ref 3.5–5.3)
POTASSIUM SERPL-SCNC: 4 MMOL/L (ref 3.5–5.3)
POTASSIUM SERPL-SCNC: 4.3 MMOL/L (ref 3.5–5.3)
POTASSIUM SERPL-SCNC: 4.4 MMOL/L (ref 3.5–5.3)
PR INTERVAL: 1024 MS
PR INTERVAL: 138 MS
PROCALCITONIN SERPL-MCNC: 0.08 NG/ML
PROCALCITONIN SERPL-MCNC: 0.11 NG/ML
PROCALCITONIN SERPL-MCNC: 0.12 NG/ML
PROCALCITONIN SERPL-MCNC: <0.05 NG/ML
PROT SERPL-MCNC: 6.1 G/DL (ref 6.4–8.2)
PROT SERPL-MCNC: 7.6 G/DL (ref 6.4–8.2)
PROTHROMBIN TIME: 13.5 SECONDS (ref 11.6–14.5)
QRS AXIS: -10 DEGREES
QRS AXIS: -29 DEGREES
QRS AXIS: -30 DEGREES
QRS AXIS: -6 DEGREES
QRSD INTERVAL: 124 MS
QRSD INTERVAL: 126 MS
QRSD INTERVAL: 142 MS
QRSD INTERVAL: 144 MS
QT INTERVAL: 370 MS
QT INTERVAL: 378 MS
QT INTERVAL: 420 MS
QT INTERVAL: 429 MS
QTC INTERVAL: 421 MS
QTC INTERVAL: 449 MS
QTC INTERVAL: 450 MS
QTC INTERVAL: 487 MS
RBC # BLD AUTO: 4.3 MILLION/UL (ref 3.88–5.62)
RBC # BLD AUTO: 4.41 MILLION/UL (ref 3.88–5.62)
RBC # BLD AUTO: 4.77 MILLION/UL (ref 3.88–5.62)
RBC # BLD AUTO: 4.78 MILLION/UL (ref 3.88–5.62)
RBC # BLD AUTO: 4.88 MILLION/UL (ref 3.88–5.62)
RBC # BLD AUTO: 4.94 MILLION/UL (ref 3.88–5.62)
RBC # BLD AUTO: 5.01 MILLION/UL (ref 3.88–5.62)
SAO2 % BLD FROM PO2: 86 % (ref 60–85)
SARS-COV-2 RNA RESP QL NAA+PROBE: NEGATIVE
SODIUM BLD-SCNC: 143 MMOL/L (ref 136–145)
SODIUM SERPL-SCNC: 139 MMOL/L (ref 136–145)
SODIUM SERPL-SCNC: 140 MMOL/L (ref 136–145)
SODIUM SERPL-SCNC: 141 MMOL/L (ref 136–145)
SODIUM SERPL-SCNC: 142 MMOL/L (ref 136–145)
SODIUM SERPL-SCNC: 143 MMOL/L (ref 136–145)
SODIUM SERPL-SCNC: 143 MMOL/L (ref 136–145)
SODIUM SERPL-SCNC: 144 MMOL/L (ref 136–145)
SODIUM SERPL-SCNC: 145 MMOL/L (ref 136–145)
SPECIMEN SOURCE: ABNORMAL
T WAVE AXIS: 17 DEGREES
T WAVE AXIS: 26 DEGREES
T WAVE AXIS: 46 DEGREES
T WAVE AXIS: 85 DEGREES
TRIGL SERPL-MCNC: 80 MG/DL
TROPONIN I SERPL-MCNC: 0.05 NG/ML
TROPONIN I SERPL-MCNC: 0.05 NG/ML
TROPONIN I SERPL-MCNC: 0.09 NG/ML
TROPONIN I SERPL-MCNC: 0.12 NG/ML
TSH SERPL DL<=0.05 MIU/L-ACNC: 1.81 UIU/ML (ref 0.36–3.74)
VENT AC: 16
VENT BIPAP FIO2: 40 %
VENT BIPAP FIO2: 40 %
VENT- AC: AC
VENTRICULAR RATE: 66 BPM
VENTRICULAR RATE: 78 BPM
VENTRICULAR RATE: 81 BPM
VENTRICULAR RATE: 85 BPM
VT SETTING VENT: 500 ML
WBC # BLD AUTO: 10.17 THOUSAND/UL (ref 4.31–10.16)
WBC # BLD AUTO: 10.83 THOUSAND/UL (ref 4.31–10.16)
WBC # BLD AUTO: 11.34 THOUSAND/UL (ref 4.31–10.16)
WBC # BLD AUTO: 12.44 THOUSAND/UL (ref 4.31–10.16)
WBC # BLD AUTO: 13.95 THOUSAND/UL (ref 4.31–10.16)
WBC # BLD AUTO: 14.63 THOUSAND/UL (ref 4.31–10.16)
WBC # BLD AUTO: 15.92 THOUSAND/UL (ref 4.31–10.16)

## 2021-01-01 PROCEDURE — 99231 SBSQ HOSP IP/OBS SF/LOW 25: CPT | Performed by: PHYSICIAN ASSISTANT

## 2021-01-01 PROCEDURE — 80048 BASIC METABOLIC PNL TOTAL CA: CPT | Performed by: STUDENT IN AN ORGANIZED HEALTH CARE EDUCATION/TRAINING PROGRAM

## 2021-01-01 PROCEDURE — G1004 CDSM NDSC: HCPCS

## 2021-01-01 PROCEDURE — 93010 ELECTROCARDIOGRAM REPORT: CPT | Performed by: INTERNAL MEDICINE

## 2021-01-01 PROCEDURE — 99291 CRITICAL CARE FIRST HOUR: CPT | Performed by: EMERGENCY MEDICINE

## 2021-01-01 PROCEDURE — 82948 REAGENT STRIP/BLOOD GLUCOSE: CPT

## 2021-01-01 PROCEDURE — NC001 PR NO CHARGE: Performed by: EMERGENCY MEDICINE

## 2021-01-01 PROCEDURE — 36600 WITHDRAWAL OF ARTERIAL BLOOD: CPT

## 2021-01-01 PROCEDURE — 93306 TTE W/DOPPLER COMPLETE: CPT

## 2021-01-01 PROCEDURE — 94669 MECHANICAL CHEST WALL OSCILL: CPT

## 2021-01-01 PROCEDURE — 94002 VENT MGMT INPAT INIT DAY: CPT

## 2021-01-01 PROCEDURE — 70498 CT ANGIOGRAPHY NECK: CPT

## 2021-01-01 PROCEDURE — 31500 INSERT EMERGENCY AIRWAY: CPT

## 2021-01-01 PROCEDURE — 84100 ASSAY OF PHOSPHORUS: CPT | Performed by: STUDENT IN AN ORGANIZED HEALTH CARE EDUCATION/TRAINING PROGRAM

## 2021-01-01 PROCEDURE — 94003 VENT MGMT INPAT SUBQ DAY: CPT

## 2021-01-01 PROCEDURE — 85025 COMPLETE CBC W/AUTO DIFF WBC: CPT | Performed by: NURSE PRACTITIONER

## 2021-01-01 PROCEDURE — 70450 CT HEAD/BRAIN W/O DYE: CPT

## 2021-01-01 PROCEDURE — 99232 SBSQ HOSP IP/OBS MODERATE 35: CPT | Performed by: PHYSICIAN ASSISTANT

## 2021-01-01 PROCEDURE — 94640 AIRWAY INHALATION TREATMENT: CPT

## 2021-01-01 PROCEDURE — 99497 ADVNCD CARE PLAN 30 MIN: CPT | Performed by: STUDENT IN AN ORGANIZED HEALTH CARE EDUCATION/TRAINING PROGRAM

## 2021-01-01 PROCEDURE — 71045 X-RAY EXAM CHEST 1 VIEW: CPT

## 2021-01-01 PROCEDURE — 99222 1ST HOSP IP/OBS MODERATE 55: CPT | Performed by: INTERNAL MEDICINE

## 2021-01-01 PROCEDURE — 99285 EMERGENCY DEPT VISIT HI MDM: CPT

## 2021-01-01 PROCEDURE — 99233 SBSQ HOSP IP/OBS HIGH 50: CPT | Performed by: INTERNAL MEDICINE

## 2021-01-01 PROCEDURE — 84145 PROCALCITONIN (PCT): CPT | Performed by: NURSE PRACTITIONER

## 2021-01-01 PROCEDURE — 85027 COMPLETE CBC AUTOMATED: CPT | Performed by: STUDENT IN AN ORGANIZED HEALTH CARE EDUCATION/TRAINING PROGRAM

## 2021-01-01 PROCEDURE — 99498 ADVNCD CARE PLAN ADDL 30 MIN: CPT | Performed by: STUDENT IN AN ORGANIZED HEALTH CARE EDUCATION/TRAINING PROGRAM

## 2021-01-01 PROCEDURE — 85025 COMPLETE CBC W/AUTO DIFF WBC: CPT | Performed by: EMERGENCY MEDICINE

## 2021-01-01 PROCEDURE — 3079F DIAST BP 80-89 MM HG: CPT | Performed by: ORTHOPAEDIC SURGERY

## 2021-01-01 PROCEDURE — 80053 COMPREHEN METABOLIC PANEL: CPT | Performed by: EMERGENCY MEDICINE

## 2021-01-01 PROCEDURE — 5A1945Z RESPIRATORY VENTILATION, 24-96 CONSECUTIVE HOURS: ICD-10-PCS | Performed by: STUDENT IN AN ORGANIZED HEALTH CARE EDUCATION/TRAINING PROGRAM

## 2021-01-01 PROCEDURE — 82805 BLOOD GASES W/O2 SATURATION: CPT

## 2021-01-01 PROCEDURE — 99233 SBSQ HOSP IP/OBS HIGH 50: CPT | Performed by: STUDENT IN AN ORGANIZED HEALTH CARE EDUCATION/TRAINING PROGRAM

## 2021-01-01 PROCEDURE — 99232 SBSQ HOSP IP/OBS MODERATE 35: CPT | Performed by: STUDENT IN AN ORGANIZED HEALTH CARE EDUCATION/TRAINING PROGRAM

## 2021-01-01 PROCEDURE — 80048 BASIC METABOLIC PNL TOTAL CA: CPT

## 2021-01-01 PROCEDURE — 93005 ELECTROCARDIOGRAM TRACING: CPT

## 2021-01-01 PROCEDURE — 94760 N-INVAS EAR/PLS OXIMETRY 1: CPT | Performed by: SOCIAL WORKER

## 2021-01-01 PROCEDURE — U0003 INFECTIOUS AGENT DETECTION BY NUCLEIC ACID (DNA OR RNA); SEVERE ACUTE RESPIRATORY SYNDROME CORONAVIRUS 2 (SARS-COV-2) (CORONAVIRUS DISEASE [COVID-19]), AMPLIFIED PROBE TECHNIQUE, MAKING USE OF HIGH THROUGHPUT TECHNOLOGIES AS DESCRIBED BY CMS-2020-01-R: HCPCS | Performed by: EMERGENCY MEDICINE

## 2021-01-01 PROCEDURE — 80048 BASIC METABOLIC PNL TOTAL CA: CPT | Performed by: REGISTERED NURSE

## 2021-01-01 PROCEDURE — 85014 HEMATOCRIT: CPT

## 2021-01-01 PROCEDURE — 36415 COLL VENOUS BLD VENIPUNCTURE: CPT | Performed by: EMERGENCY MEDICINE

## 2021-01-01 PROCEDURE — 99291 CRITICAL CARE FIRST HOUR: CPT | Performed by: PSYCHIATRY & NEUROLOGY

## 2021-01-01 PROCEDURE — 82805 BLOOD GASES W/O2 SATURATION: CPT | Performed by: STUDENT IN AN ORGANIZED HEALTH CARE EDUCATION/TRAINING PROGRAM

## 2021-01-01 PROCEDURE — 71046 X-RAY EXAM CHEST 2 VIEWS: CPT

## 2021-01-01 PROCEDURE — 83735 ASSAY OF MAGNESIUM: CPT | Performed by: STUDENT IN AN ORGANIZED HEALTH CARE EDUCATION/TRAINING PROGRAM

## 2021-01-01 PROCEDURE — 94760 N-INVAS EAR/PLS OXIMETRY 1: CPT

## 2021-01-01 PROCEDURE — 99233 SBSQ HOSP IP/OBS HIGH 50: CPT | Performed by: EMERGENCY MEDICINE

## 2021-01-01 PROCEDURE — 3079F DIAST BP 80-89 MM HG: CPT | Performed by: PHYSICIAN ASSISTANT

## 2021-01-01 PROCEDURE — 93306 TTE W/DOPPLER COMPLETE: CPT | Performed by: INTERNAL MEDICINE

## 2021-01-01 PROCEDURE — 80076 HEPATIC FUNCTION PANEL: CPT

## 2021-01-01 PROCEDURE — 3077F SYST BP >= 140 MM HG: CPT | Performed by: ORTHOPAEDIC SURGERY

## 2021-01-01 PROCEDURE — 72125 CT NECK SPINE W/O DYE: CPT

## 2021-01-01 PROCEDURE — 85379 FIBRIN DEGRADATION QUANT: CPT | Performed by: EMERGENCY MEDICINE

## 2021-01-01 PROCEDURE — 3077F SYST BP >= 140 MM HG: CPT | Performed by: PHYSICIAN ASSISTANT

## 2021-01-01 PROCEDURE — 84100 ASSAY OF PHOSPHORUS: CPT

## 2021-01-01 PROCEDURE — 84132 ASSAY OF SERUM POTASSIUM: CPT

## 2021-01-01 PROCEDURE — 1160F RVW MEDS BY RX/DR IN RCRD: CPT | Performed by: PHYSICIAN ASSISTANT

## 2021-01-01 PROCEDURE — 80048 BASIC METABOLIC PNL TOTAL CA: CPT | Performed by: NURSE PRACTITIONER

## 2021-01-01 PROCEDURE — 84145 PROCALCITONIN (PCT): CPT | Performed by: PHYSICIAN ASSISTANT

## 2021-01-01 PROCEDURE — 84443 ASSAY THYROID STIM HORMONE: CPT

## 2021-01-01 PROCEDURE — 82805 BLOOD GASES W/O2 SATURATION: CPT | Performed by: EMERGENCY MEDICINE

## 2021-01-01 PROCEDURE — 71275 CT ANGIOGRAPHY CHEST: CPT

## 2021-01-01 PROCEDURE — C9113 INJ PANTOPRAZOLE SODIUM, VIA: HCPCS

## 2021-01-01 PROCEDURE — 99223 1ST HOSP IP/OBS HIGH 75: CPT | Performed by: INTERNAL MEDICINE

## 2021-01-01 PROCEDURE — 82947 ASSAY GLUCOSE BLOOD QUANT: CPT

## 2021-01-01 PROCEDURE — 85610 PROTHROMBIN TIME: CPT | Performed by: INTERNAL MEDICINE

## 2021-01-01 PROCEDURE — 84484 ASSAY OF TROPONIN QUANT: CPT | Performed by: EMERGENCY MEDICINE

## 2021-01-01 PROCEDURE — 84484 ASSAY OF TROPONIN QUANT: CPT

## 2021-01-01 PROCEDURE — 94664 DEMO&/EVAL PT USE INHALER: CPT

## 2021-01-01 PROCEDURE — 99232 SBSQ HOSP IP/OBS MODERATE 35: CPT | Performed by: INTERNAL MEDICINE

## 2021-01-01 PROCEDURE — 83036 HEMOGLOBIN GLYCOSYLATED A1C: CPT

## 2021-01-01 PROCEDURE — 82803 BLOOD GASES ANY COMBINATION: CPT

## 2021-01-01 PROCEDURE — 83735 ASSAY OF MAGNESIUM: CPT | Performed by: REGISTERED NURSE

## 2021-01-01 PROCEDURE — 99232 SBSQ HOSP IP/OBS MODERATE 35: CPT | Performed by: PSYCHIATRY & NEUROLOGY

## 2021-01-01 PROCEDURE — 70496 CT ANGIOGRAPHY HEAD: CPT

## 2021-01-01 PROCEDURE — U0005 INFEC AGEN DETEC AMPLI PROBE: HCPCS | Performed by: EMERGENCY MEDICINE

## 2021-01-01 PROCEDURE — 99223 1ST HOSP IP/OBS HIGH 75: CPT | Performed by: PHYSICAL MEDICINE & REHABILITATION

## 2021-01-01 PROCEDURE — 83880 ASSAY OF NATRIURETIC PEPTIDE: CPT | Performed by: INTERNAL MEDICINE

## 2021-01-01 PROCEDURE — 1036F TOBACCO NON-USER: CPT | Performed by: ORTHOPAEDIC SURGERY

## 2021-01-01 PROCEDURE — 85730 THROMBOPLASTIN TIME PARTIAL: CPT | Performed by: INTERNAL MEDICINE

## 2021-01-01 PROCEDURE — 99213 OFFICE O/P EST LOW 20 MIN: CPT | Performed by: ORTHOPAEDIC SURGERY

## 2021-01-01 PROCEDURE — 94640 AIRWAY INHALATION TREATMENT: CPT | Performed by: SOCIAL WORKER

## 2021-01-01 PROCEDURE — 99285 EMERGENCY DEPT VISIT HI MDM: CPT | Performed by: EMERGENCY MEDICINE

## 2021-01-01 PROCEDURE — 0BH18EZ INSERTION OF ENDOTRACHEAL AIRWAY INTO TRACHEA, VIA NATURAL OR ARTIFICIAL OPENING ENDOSCOPIC: ICD-10-PCS | Performed by: STUDENT IN AN ORGANIZED HEALTH CARE EDUCATION/TRAINING PROGRAM

## 2021-01-01 PROCEDURE — 1036F TOBACCO NON-USER: CPT | Performed by: PHYSICIAN ASSISTANT

## 2021-01-01 PROCEDURE — 82330 ASSAY OF CALCIUM: CPT | Performed by: REGISTERED NURSE

## 2021-01-01 PROCEDURE — 99233 SBSQ HOSP IP/OBS HIGH 50: CPT | Performed by: NURSE PRACTITIONER

## 2021-01-01 PROCEDURE — 70551 MRI BRAIN STEM W/O DYE: CPT

## 2021-01-01 PROCEDURE — 85025 COMPLETE CBC W/AUTO DIFF WBC: CPT | Performed by: REGISTERED NURSE

## 2021-01-01 PROCEDURE — C9113 INJ PANTOPRAZOLE SODIUM, VIA: HCPCS | Performed by: STUDENT IN AN ORGANIZED HEALTH CARE EDUCATION/TRAINING PROGRAM

## 2021-01-01 PROCEDURE — 83735 ASSAY OF MAGNESIUM: CPT

## 2021-01-01 PROCEDURE — 84484 ASSAY OF TROPONIN QUANT: CPT | Performed by: PHYSICIAN ASSISTANT

## 2021-01-01 PROCEDURE — NC001 PR NO CHARGE: Performed by: PHYSICIAN ASSISTANT

## 2021-01-01 PROCEDURE — 3E03317 INTRODUCTION OF OTHER THROMBOLYTIC INTO PERIPHERAL VEIN, PERCUTANEOUS APPROACH: ICD-10-PCS | Performed by: PHYSICIAN ASSISTANT

## 2021-01-01 PROCEDURE — C9113 INJ PANTOPRAZOLE SODIUM, VIA: HCPCS | Performed by: REGISTERED NURSE

## 2021-01-01 PROCEDURE — 82805 BLOOD GASES W/O2 SATURATION: CPT | Performed by: NURSE PRACTITIONER

## 2021-01-01 PROCEDURE — 80061 LIPID PANEL: CPT | Performed by: PHYSICIAN ASSISTANT

## 2021-01-01 PROCEDURE — 99214 OFFICE O/P EST MOD 30 MIN: CPT | Performed by: PHYSICIAN ASSISTANT

## 2021-01-01 PROCEDURE — 84295 ASSAY OF SERUM SODIUM: CPT

## 2021-01-01 PROCEDURE — 80048 BASIC METABOLIC PNL TOTAL CA: CPT | Performed by: PHYSICIAN ASSISTANT

## 2021-01-01 PROCEDURE — 93970 EXTREMITY STUDY: CPT | Performed by: SURGERY

## 2021-01-01 PROCEDURE — 74018 RADEX ABDOMEN 1 VIEW: CPT

## 2021-01-01 PROCEDURE — 99291 CRITICAL CARE FIRST HOUR: CPT | Performed by: NURSE PRACTITIONER

## 2021-01-01 PROCEDURE — 93970 EXTREMITY STUDY: CPT

## 2021-01-01 PROCEDURE — 99292 CRITICAL CARE ADDL 30 MIN: CPT | Performed by: EMERGENCY MEDICINE

## 2021-01-01 PROCEDURE — 99232 SBSQ HOSP IP/OBS MODERATE 35: CPT | Performed by: NURSE PRACTITIONER

## 2021-01-01 RX ORDER — HYDRALAZINE HYDROCHLORIDE 20 MG/ML
5 INJECTION INTRAMUSCULAR; INTRAVENOUS EVERY 6 HOURS PRN
Status: DISCONTINUED | OUTPATIENT
Start: 2021-01-01 | End: 2021-01-01

## 2021-01-01 RX ORDER — SENNOSIDES 8.8 MG/5ML
8.8 LIQUID ORAL
Status: DISCONTINUED | OUTPATIENT
Start: 2021-01-01 | End: 2021-01-01

## 2021-01-01 RX ORDER — LEVALBUTEROL 1.25 MG/.5ML
1.25 SOLUTION, CONCENTRATE RESPIRATORY (INHALATION)
Status: DISCONTINUED | OUTPATIENT
Start: 2021-01-01 | End: 2021-01-01

## 2021-01-01 RX ORDER — ATORVASTATIN CALCIUM 40 MG/1
40 TABLET, FILM COATED ORAL
Status: DISCONTINUED | OUTPATIENT
Start: 2021-01-01 | End: 2021-01-01

## 2021-01-01 RX ORDER — SENNOSIDES 8.8 MG/5ML
8.8 LIQUID ORAL 2 TIMES DAILY
Status: DISCONTINUED | OUTPATIENT
Start: 2021-01-01 | End: 2021-01-01

## 2021-01-01 RX ORDER — POLYETHYLENE GLYCOL 3350 17 G/17G
17 POWDER, FOR SOLUTION ORAL DAILY
Status: DISCONTINUED | OUTPATIENT
Start: 2021-01-01 | End: 2021-01-01

## 2021-01-01 RX ORDER — ALBUTEROL SULFATE 2.5 MG/3ML
2.5 SOLUTION RESPIRATORY (INHALATION) EVERY 4 HOURS PRN
Status: DISCONTINUED | OUTPATIENT
Start: 2021-01-01 | End: 2021-01-01

## 2021-01-01 RX ORDER — HYDRALAZINE HYDROCHLORIDE 20 MG/ML
10 INJECTION INTRAMUSCULAR; INTRAVENOUS EVERY 6 HOURS PRN
Status: DISCONTINUED | OUTPATIENT
Start: 2021-01-01 | End: 2021-01-01

## 2021-01-01 RX ORDER — FUROSEMIDE 10 MG/ML
20 INJECTION INTRAMUSCULAR; INTRAVENOUS ONCE
Status: COMPLETED | OUTPATIENT
Start: 2021-01-01 | End: 2021-01-01

## 2021-01-01 RX ORDER — PANTOPRAZOLE SODIUM 40 MG/1
40 TABLET, DELAYED RELEASE ORAL
Status: DISCONTINUED | OUTPATIENT
Start: 2021-01-01 | End: 2021-01-01

## 2021-01-01 RX ORDER — LABETALOL 20 MG/4 ML (5 MG/ML) INTRAVENOUS SYRINGE
10 EVERY 4 HOURS PRN
Status: DISCONTINUED | OUTPATIENT
Start: 2021-01-01 | End: 2021-01-01

## 2021-01-01 RX ORDER — PANTOPRAZOLE SODIUM 40 MG/1
40 INJECTION, POWDER, FOR SOLUTION INTRAVENOUS
Status: DISCONTINUED | OUTPATIENT
Start: 2021-01-01 | End: 2021-01-01

## 2021-01-01 RX ORDER — METHYLPREDNISOLONE SODIUM SUCCINATE 125 MG/2ML
125 INJECTION, POWDER, LYOPHILIZED, FOR SOLUTION INTRAMUSCULAR; INTRAVENOUS ONCE
Status: COMPLETED | OUTPATIENT
Start: 2021-01-01 | End: 2021-01-01

## 2021-01-01 RX ORDER — LABETALOL 20 MG/4 ML (5 MG/ML) INTRAVENOUS SYRINGE
Status: COMPLETED
Start: 2021-01-01 | End: 2021-01-01

## 2021-01-01 RX ORDER — ACETYLCYSTEINE 200 MG/ML
3 SOLUTION ORAL; RESPIRATORY (INHALATION)
Status: COMPLETED | OUTPATIENT
Start: 2021-01-01 | End: 2021-01-01

## 2021-01-01 RX ORDER — FENTANYL CITRATE 50 UG/ML
25 INJECTION, SOLUTION INTRAMUSCULAR; INTRAVENOUS EVERY 2 HOUR PRN
Status: DISCONTINUED | OUTPATIENT
Start: 2021-01-01 | End: 2021-01-01

## 2021-01-01 RX ORDER — FENTANYL CITRATE 50 UG/ML
25 INJECTION, SOLUTION INTRAMUSCULAR; INTRAVENOUS ONCE
Status: DISCONTINUED | OUTPATIENT
Start: 2021-01-01 | End: 2021-01-01

## 2021-01-01 RX ORDER — LABETALOL 20 MG/4 ML (5 MG/ML) INTRAVENOUS SYRINGE
10 ONCE
Status: DISCONTINUED | OUTPATIENT
Start: 2021-01-01 | End: 2021-01-01

## 2021-01-01 RX ORDER — VECURONIUM BROMIDE 1 MG/ML
10 INJECTION, POWDER, LYOPHILIZED, FOR SOLUTION INTRAVENOUS ONCE
Status: COMPLETED | OUTPATIENT
Start: 2021-01-01 | End: 2021-01-01

## 2021-01-01 RX ORDER — FUROSEMIDE 10 MG/ML
20 INJECTION INTRAMUSCULAR; INTRAVENOUS DAILY
Status: DISCONTINUED | OUTPATIENT
Start: 2021-01-01 | End: 2021-01-01

## 2021-01-01 RX ORDER — AMLODIPINE BESYLATE 10 MG/1
10 TABLET ORAL DAILY
Status: DISCONTINUED | OUTPATIENT
Start: 2021-01-01 | End: 2021-01-01

## 2021-01-01 RX ORDER — POTASSIUM CHLORIDE 20MEQ/15ML
40 LIQUID (ML) ORAL ONCE
Status: COMPLETED | OUTPATIENT
Start: 2021-01-01 | End: 2021-01-01

## 2021-01-01 RX ORDER — GLYCOPYRROLATE 0.2 MG/ML
0.1 INJECTION INTRAMUSCULAR; INTRAVENOUS EVERY 4 HOURS PRN
Status: DISCONTINUED | OUTPATIENT
Start: 2021-01-01 | End: 2021-01-01

## 2021-01-01 RX ORDER — FENTANYL CITRATE 50 UG/ML
50 INJECTION, SOLUTION INTRAMUSCULAR; INTRAVENOUS ONCE
Status: COMPLETED | OUTPATIENT
Start: 2021-01-01 | End: 2021-01-01

## 2021-01-01 RX ORDER — HYDROMORPHONE HCL/PF 1 MG/ML
1 SYRINGE (ML) INJECTION
Status: DISCONTINUED | OUTPATIENT
Start: 2021-01-01 | End: 2021-01-01 | Stop reason: HOSPADM

## 2021-01-01 RX ORDER — NOREPINEPHRINE BITARTRATE 1 MG/ML
INJECTION, SOLUTION INTRAVENOUS
Status: DISPENSED
Start: 2021-01-01 | End: 2021-01-01

## 2021-01-01 RX ORDER — LANOLIN ALCOHOL/MO/W.PET/CERES
6 CREAM (GRAM) TOPICAL
Status: DISCONTINUED | OUTPATIENT
Start: 2021-01-01 | End: 2021-01-01

## 2021-01-01 RX ORDER — MAGNESIUM SULFATE HEPTAHYDRATE 40 MG/ML
2 INJECTION, SOLUTION INTRAVENOUS ONCE
Status: COMPLETED | OUTPATIENT
Start: 2021-01-01 | End: 2021-01-01

## 2021-01-01 RX ORDER — DEXAMETHASONE SODIUM PHOSPHATE 4 MG/ML
10 INJECTION, SOLUTION INTRA-ARTICULAR; INTRALESIONAL; INTRAMUSCULAR; INTRAVENOUS; SOFT TISSUE ONCE
Status: COMPLETED | OUTPATIENT
Start: 2021-01-01 | End: 2021-01-01

## 2021-01-01 RX ORDER — DEXMEDETOMIDINE 100 UG/ML
.1-.7 INJECTION, SOLUTION, CONCENTRATE INTRAVENOUS
Status: DISCONTINUED | OUTPATIENT
Start: 2021-01-01 | End: 2021-01-01

## 2021-01-01 RX ORDER — DOCUSATE SODIUM 100 MG/1
100 CAPSULE, LIQUID FILLED ORAL 2 TIMES DAILY
Status: DISCONTINUED | OUTPATIENT
Start: 2021-01-01 | End: 2021-01-01

## 2021-01-01 RX ORDER — ASPIRIN 81 MG/1
324 TABLET, CHEWABLE ORAL ONCE
Status: COMPLETED | OUTPATIENT
Start: 2021-01-01 | End: 2021-01-01

## 2021-01-01 RX ORDER — POTASSIUM CHLORIDE 20MEQ/15ML
20 LIQUID (ML) ORAL ONCE
Status: COMPLETED | OUTPATIENT
Start: 2021-01-01 | End: 2021-01-01

## 2021-01-01 RX ORDER — ACETAMINOPHEN 325 MG/1
650 TABLET ORAL EVERY 6 HOURS PRN
Status: DISCONTINUED | OUTPATIENT
Start: 2021-01-01 | End: 2021-01-01

## 2021-01-01 RX ORDER — ASPIRIN 81 MG/1
81 TABLET, CHEWABLE ORAL DAILY
Status: DISCONTINUED | OUTPATIENT
Start: 2021-01-01 | End: 2021-01-01

## 2021-01-01 RX ORDER — PROPOFOL 10 MG/ML
5-50 INJECTION, EMULSION INTRAVENOUS
Status: DISCONTINUED | OUTPATIENT
Start: 2021-01-01 | End: 2021-01-01

## 2021-01-01 RX ORDER — FENTANYL CITRATE/PF 50 MCG/ML
50 SYRINGE (ML) INJECTION
Status: DISCONTINUED | OUTPATIENT
Start: 2021-01-01 | End: 2021-01-01

## 2021-01-01 RX ORDER — CALCIUM CARBONATE 200(500)MG
1000 TABLET,CHEWABLE ORAL DAILY PRN
Status: DISCONTINUED | OUTPATIENT
Start: 2021-01-01 | End: 2021-01-01

## 2021-01-01 RX ORDER — BISACODYL 10 MG
10 SUPPOSITORY, RECTAL RECTAL DAILY PRN
Status: DISCONTINUED | OUTPATIENT
Start: 2021-01-01 | End: 2021-01-01 | Stop reason: HOSPADM

## 2021-01-01 RX ORDER — LORAZEPAM 2 MG/ML
1 INJECTION INTRAMUSCULAR
Status: DISCONTINUED | OUTPATIENT
Start: 2021-01-01 | End: 2021-01-01 | Stop reason: HOSPADM

## 2021-01-01 RX ORDER — GLYCOPYRROLATE 0.2 MG/ML
0.1 INJECTION INTRAMUSCULAR; INTRAVENOUS ONCE
Status: COMPLETED | OUTPATIENT
Start: 2021-01-01 | End: 2021-01-01

## 2021-01-01 RX ORDER — ALBUTEROL SULFATE 2.5 MG/3ML
SOLUTION RESPIRATORY (INHALATION)
Status: COMPLETED
Start: 2021-01-01 | End: 2021-01-01

## 2021-01-01 RX ORDER — HEPARIN SODIUM 5000 [USP'U]/ML
5000 INJECTION, SOLUTION INTRAVENOUS; SUBCUTANEOUS EVERY 8 HOURS SCHEDULED
Status: DISCONTINUED | OUTPATIENT
Start: 2021-01-01 | End: 2021-01-01

## 2021-01-01 RX ORDER — FENTANYL CITRATE 50 UG/ML
100 INJECTION, SOLUTION INTRAMUSCULAR; INTRAVENOUS ONCE
Status: COMPLETED | OUTPATIENT
Start: 2021-01-01 | End: 2021-01-01

## 2021-01-01 RX ORDER — POTASSIUM CHLORIDE 20MEQ/15ML
20 LIQUID (ML) ORAL DAILY
Status: DISCONTINUED | OUTPATIENT
Start: 2021-01-01 | End: 2021-01-01

## 2021-01-01 RX ORDER — MIDAZOLAM HYDROCHLORIDE 2 MG/2ML
2 INJECTION, SOLUTION INTRAMUSCULAR; INTRAVENOUS ONCE
Status: DISCONTINUED | OUTPATIENT
Start: 2021-01-01 | End: 2021-01-01

## 2021-01-01 RX ORDER — DEXAMETHASONE SODIUM PHOSPHATE 10 MG/ML
10 INJECTION, SOLUTION INTRAMUSCULAR; INTRAVENOUS ONCE
Status: DISCONTINUED | OUTPATIENT
Start: 2021-01-01 | End: 2021-01-01

## 2021-01-01 RX ORDER — METHYLPREDNISOLONE SODIUM SUCCINATE 40 MG/ML
40 INJECTION, POWDER, LYOPHILIZED, FOR SOLUTION INTRAMUSCULAR; INTRAVENOUS EVERY 12 HOURS SCHEDULED
Status: DISCONTINUED | OUTPATIENT
Start: 2021-01-01 | End: 2021-01-01

## 2021-01-01 RX ORDER — SODIUM CHLORIDE, SODIUM GLUCONATE, SODIUM ACETATE, POTASSIUM CHLORIDE, MAGNESIUM CHLORIDE, SODIUM PHOSPHATE, DIBASIC, AND POTASSIUM PHOSPHATE .53; .5; .37; .037; .03; .012; .00082 G/100ML; G/100ML; G/100ML; G/100ML; G/100ML; G/100ML; G/100ML
100 INJECTION, SOLUTION INTRAVENOUS CONTINUOUS
Status: DISCONTINUED | OUTPATIENT
Start: 2021-01-01 | End: 2021-01-01

## 2021-01-01 RX ORDER — NICARDIPINE HYDROCHLORIDE 2.5 MG/ML
INJECTION INTRAVENOUS
Status: DISPENSED
Start: 2021-01-01 | End: 2021-01-01

## 2021-01-01 RX ORDER — SCOLOPAMINE TRANSDERMAL SYSTEM 1 MG/1
1 PATCH, EXTENDED RELEASE TRANSDERMAL
Status: DISCONTINUED | OUTPATIENT
Start: 2021-01-01 | End: 2021-01-01 | Stop reason: HOSPADM

## 2021-01-01 RX ORDER — GLYCOPYRROLATE 0.2 MG/ML
0.2 INJECTION INTRAMUSCULAR; INTRAVENOUS
Status: DISCONTINUED | OUTPATIENT
Start: 2021-01-01 | End: 2021-01-01 | Stop reason: HOSPADM

## 2021-01-01 RX ORDER — FENTANYL CITRATE 50 UG/ML
50 INJECTION, SOLUTION INTRAMUSCULAR; INTRAVENOUS
Status: DISCONTINUED | OUTPATIENT
Start: 2021-01-01 | End: 2021-01-01

## 2021-01-01 RX ORDER — LISINOPRIL 5 MG/1
5 TABLET ORAL DAILY
Status: DISCONTINUED | OUTPATIENT
Start: 2021-01-01 | End: 2021-01-01

## 2021-01-01 RX ADMIN — ALBUTEROL SULFATE 2.5 MG: 2.5 SOLUTION RESPIRATORY (INHALATION) at 13:17

## 2021-01-01 RX ADMIN — HEPARIN SODIUM 5000 UNITS: 5000 INJECTION INTRAVENOUS; SUBCUTANEOUS at 05:35

## 2021-01-01 RX ADMIN — HYDROMORPHONE HYDROCHLORIDE 1 MG: 1 INJECTION, SOLUTION INTRAMUSCULAR; INTRAVENOUS; SUBCUTANEOUS at 10:55

## 2021-01-01 RX ADMIN — IPRATROPIUM BROMIDE 0.5 MG: 0.5 SOLUTION RESPIRATORY (INHALATION) at 00:30

## 2021-01-01 RX ADMIN — AMLODIPINE BESYLATE 10 MG: 10 TABLET ORAL at 08:31

## 2021-01-01 RX ADMIN — SENNOSIDES 8.8 MG: 8.8 SYRUP ORAL at 09:11

## 2021-01-01 RX ADMIN — IPRATROPIUM BROMIDE 0.5 MG: 0.5 SOLUTION RESPIRATORY (INHALATION) at 04:35

## 2021-01-01 RX ADMIN — HEPARIN SODIUM 5000 UNITS: 5000 INJECTION INTRAVENOUS; SUBCUTANEOUS at 21:01

## 2021-01-01 RX ADMIN — FENTANYL CITRATE 50 MCG: 50 INJECTION INTRAMUSCULAR; INTRAVENOUS at 02:45

## 2021-01-01 RX ADMIN — AMLODIPINE BESYLATE 10 MG: 10 TABLET ORAL at 09:11

## 2021-01-01 RX ADMIN — MAGNESIUM SULFATE HEPTAHYDRATE 2 G: 40 INJECTION, SOLUTION INTRAVENOUS at 20:57

## 2021-01-01 RX ADMIN — LABETALOL 20 MG/4 ML (5 MG/ML) INTRAVENOUS SYRINGE 10 MG: at 11:35

## 2021-01-01 RX ADMIN — LISINOPRIL 5 MG: 5 TABLET ORAL at 08:41

## 2021-01-01 RX ADMIN — ASPIRIN 81 MG CHEWABLE TABLET 324 MG: 81 TABLET CHEWABLE at 08:56

## 2021-01-01 RX ADMIN — FUROSEMIDE 20 MG: 10 INJECTION, SOLUTION INTRAMUSCULAR; INTRAVENOUS at 13:22

## 2021-01-01 RX ADMIN — METHYLPREDNISOLONE SODIUM SUCCINATE 125 MG: 125 INJECTION, POWDER, FOR SOLUTION INTRAMUSCULAR; INTRAVENOUS at 17:01

## 2021-01-01 RX ADMIN — IPRATROPIUM BROMIDE 0.5 MG: 0.5 SOLUTION RESPIRATORY (INHALATION) at 08:41

## 2021-01-01 RX ADMIN — POTASSIUM & SODIUM PHOSPHATES POWDER PACK 280-160-250 MG 1 PACKET: 280-160-250 PACK at 08:42

## 2021-01-01 RX ADMIN — DEXMEDETOMIDINE 0.5 MCG/KG/HR: 100 INJECTION, SOLUTION, CONCENTRATE INTRAVENOUS at 01:01

## 2021-01-01 RX ADMIN — LEVALBUTEROL HYDROCHLORIDE 1.25 MG: 1.25 SOLUTION, CONCENTRATE RESPIRATORY (INHALATION) at 13:37

## 2021-01-01 RX ADMIN — METHYLPREDNISOLONE SODIUM SUCCINATE 40 MG: 40 INJECTION, POWDER, FOR SOLUTION INTRAMUSCULAR; INTRAVENOUS at 09:34

## 2021-01-01 RX ADMIN — POLYETHYLENE GLYCOL 3350 17 G: 17 POWDER, FOR SOLUTION ORAL at 11:19

## 2021-01-01 RX ADMIN — IPRATROPIUM BROMIDE 0.5 MG: 0.5 SOLUTION RESPIRATORY (INHALATION) at 19:38

## 2021-01-01 RX ADMIN — POTASSIUM CHLORIDE 20 MEQ: 20 SOLUTION ORAL at 08:41

## 2021-01-01 RX ADMIN — FENTANYL CITRATE 100 MCG: 50 INJECTION INTRAMUSCULAR; INTRAVENOUS at 12:05

## 2021-01-01 RX ADMIN — LEVALBUTEROL HYDROCHLORIDE 1.25 MG: 1.25 SOLUTION, CONCENTRATE RESPIRATORY (INHALATION) at 19:31

## 2021-01-01 RX ADMIN — LEVALBUTEROL HYDROCHLORIDE 1.25 MG: 1.25 SOLUTION, CONCENTRATE RESPIRATORY (INHALATION) at 07:45

## 2021-01-01 RX ADMIN — ACETYLCYSTEINE 600 MG: 200 SOLUTION ORAL; RESPIRATORY (INHALATION) at 04:35

## 2021-01-01 RX ADMIN — HYDRALAZINE HYDROCHLORIDE 10 MG: 20 INJECTION, SOLUTION INTRAMUSCULAR; INTRAVENOUS at 20:21

## 2021-01-01 RX ADMIN — IPRATROPIUM BROMIDE 0.5 MG: 0.5 SOLUTION RESPIRATORY (INHALATION) at 07:14

## 2021-01-01 RX ADMIN — HYDRALAZINE HYDROCHLORIDE 10 MG: 20 INJECTION, SOLUTION INTRAMUSCULAR; INTRAVENOUS at 09:30

## 2021-01-01 RX ADMIN — LEVALBUTEROL HYDROCHLORIDE 1.25 MG: 1.25 SOLUTION, CONCENTRATE RESPIRATORY (INHALATION) at 08:41

## 2021-01-01 RX ADMIN — LABETALOL 20 MG/4 ML (5 MG/ML) INTRAVENOUS SYRINGE 10 MG: at 04:13

## 2021-01-01 RX ADMIN — BISACODYL 10 MG: 10 SUPPOSITORY RECTAL at 09:11

## 2021-01-01 RX ADMIN — INSULIN LISPRO 2 UNITS: 100 INJECTION, SOLUTION INTRAVENOUS; SUBCUTANEOUS at 00:51

## 2021-01-01 RX ADMIN — LEVALBUTEROL HYDROCHLORIDE 1.25 MG: 1.25 SOLUTION, CONCENTRATE RESPIRATORY (INHALATION) at 13:06

## 2021-01-01 RX ADMIN — METHYLPREDNISOLONE SODIUM SUCCINATE 40 MG: 40 INJECTION, POWDER, FOR SOLUTION INTRAMUSCULAR; INTRAVENOUS at 21:21

## 2021-01-01 RX ADMIN — IPRATROPIUM BROMIDE 0.5 MG: 0.5 SOLUTION RESPIRATORY (INHALATION) at 19:03

## 2021-01-01 RX ADMIN — ASPIRIN 81 MG: 81 TABLET, CHEWABLE ORAL at 09:22

## 2021-01-01 RX ADMIN — PROPOFOL 50 MCG/KG/MIN: 10 INJECTION, EMULSION INTRAVENOUS at 08:30

## 2021-01-01 RX ADMIN — SENNOSIDES 8.8 MG: 8.8 SYRUP ORAL at 21:58

## 2021-01-01 RX ADMIN — IPRATROPIUM BROMIDE 0.5 MG: 0.5 SOLUTION RESPIRATORY (INHALATION) at 13:28

## 2021-01-01 RX ADMIN — ACETAMINOPHEN 650 MG: 325 TABLET, FILM COATED ORAL at 16:11

## 2021-01-01 RX ADMIN — HEPARIN SODIUM 5000 UNITS: 5000 INJECTION INTRAVENOUS; SUBCUTANEOUS at 21:58

## 2021-01-01 RX ADMIN — LORAZEPAM 1 MG: 2 INJECTION INTRAMUSCULAR; INTRAVENOUS at 10:00

## 2021-01-01 RX ADMIN — LEVALBUTEROL HYDROCHLORIDE 1.25 MG: 1.25 SOLUTION, CONCENTRATE RESPIRATORY (INHALATION) at 20:08

## 2021-01-01 RX ADMIN — HEPARIN SODIUM 5000 UNITS: 5000 INJECTION INTRAVENOUS; SUBCUTANEOUS at 05:38

## 2021-01-01 RX ADMIN — PROPOFOL 35 MCG/KG/MIN: 10 INJECTION, EMULSION INTRAVENOUS at 13:20

## 2021-01-01 RX ADMIN — ALBUTEROL SULFATE 2.5 MG: 2.5 SOLUTION RESPIRATORY (INHALATION) at 03:18

## 2021-01-01 RX ADMIN — FENTANYL CITRATE 25 MCG: 50 INJECTION INTRAMUSCULAR; INTRAVENOUS at 18:01

## 2021-01-01 RX ADMIN — HYDRALAZINE HYDROCHLORIDE 5 MG: 20 INJECTION, SOLUTION INTRAMUSCULAR; INTRAVENOUS at 16:11

## 2021-01-01 RX ADMIN — ALBUTEROL SULFATE 2.5 MG: 2.5 SOLUTION RESPIRATORY (INHALATION) at 10:34

## 2021-01-01 RX ADMIN — ACETYLCYSTEINE 600 MG: 200 SOLUTION ORAL; RESPIRATORY (INHALATION) at 13:37

## 2021-01-01 RX ADMIN — LEVALBUTEROL HYDROCHLORIDE 1.25 MG: 1.25 SOLUTION, CONCENTRATE RESPIRATORY (INHALATION) at 07:00

## 2021-01-01 RX ADMIN — LISINOPRIL 5 MG: 5 TABLET ORAL at 09:23

## 2021-01-01 RX ADMIN — ALBUTEROL SULFATE 2.5 MG: 2.5 SOLUTION RESPIRATORY (INHALATION) at 09:57

## 2021-01-01 RX ADMIN — SODIUM CHLORIDE, SODIUM GLUCONATE, SODIUM ACETATE, POTASSIUM CHLORIDE, MAGNESIUM CHLORIDE, SODIUM PHOSPHATE, DIBASIC, AND POTASSIUM PHOSPHATE 100 ML/HR: .53; .5; .37; .037; .03; .012; .00082 INJECTION, SOLUTION INTRAVENOUS at 17:02

## 2021-01-01 RX ADMIN — HYDRALAZINE HYDROCHLORIDE 10 MG: 20 INJECTION, SOLUTION INTRAMUSCULAR; INTRAVENOUS at 13:49

## 2021-01-01 RX ADMIN — LEVALBUTEROL HYDROCHLORIDE 1.25 MG: 1.25 SOLUTION, CONCENTRATE RESPIRATORY (INHALATION) at 07:13

## 2021-01-01 RX ADMIN — HEPARIN SODIUM 5000 UNITS: 5000 INJECTION INTRAVENOUS; SUBCUTANEOUS at 06:41

## 2021-01-01 RX ADMIN — HEPARIN SODIUM 5000 UNITS: 5000 INJECTION INTRAVENOUS; SUBCUTANEOUS at 14:15

## 2021-01-01 RX ADMIN — FENTANYL CITRATE 50 MCG: 50 INJECTION INTRAMUSCULAR; INTRAVENOUS at 01:02

## 2021-01-01 RX ADMIN — HEPARIN SODIUM 5000 UNITS: 5000 INJECTION INTRAVENOUS; SUBCUTANEOUS at 21:43

## 2021-01-01 RX ADMIN — LEVALBUTEROL HYDROCHLORIDE 1.25 MG: 1.25 SOLUTION, CONCENTRATE RESPIRATORY (INHALATION) at 19:38

## 2021-01-01 RX ADMIN — LEVALBUTEROL HYDROCHLORIDE 1.25 MG: 1.25 SOLUTION, CONCENTRATE RESPIRATORY (INHALATION) at 06:49

## 2021-01-01 RX ADMIN — AMLODIPINE BESYLATE 10 MG: 10 TABLET ORAL at 08:42

## 2021-01-01 RX ADMIN — PROPOFOL 30 MCG/KG/MIN: 10 INJECTION, EMULSION INTRAVENOUS at 01:34

## 2021-01-01 RX ADMIN — IPRATROPIUM BROMIDE 0.5 MG: 0.5 SOLUTION RESPIRATORY (INHALATION) at 01:51

## 2021-01-01 RX ADMIN — IPRATROPIUM BROMIDE 0.5 MG: 0.5 SOLUTION RESPIRATORY (INHALATION) at 06:49

## 2021-01-01 RX ADMIN — IPRATROPIUM BROMIDE 0.5 MG: 0.5 SOLUTION RESPIRATORY (INHALATION) at 13:09

## 2021-01-01 RX ADMIN — LEVALBUTEROL HYDROCHLORIDE 1.25 MG: 1.25 SOLUTION, CONCENTRATE RESPIRATORY (INHALATION) at 01:51

## 2021-01-01 RX ADMIN — IPRATROPIUM BROMIDE 0.5 MG: 0.5 SOLUTION RESPIRATORY (INHALATION) at 13:06

## 2021-01-01 RX ADMIN — AMLODIPINE BESYLATE 10 MG: 10 TABLET ORAL at 11:25

## 2021-01-01 RX ADMIN — ATORVASTATIN CALCIUM 40 MG: 40 TABLET, FILM COATED ORAL at 18:04

## 2021-01-01 RX ADMIN — ALBUTEROL SULFATE 2.5 MG: 2.5 SOLUTION RESPIRATORY (INHALATION) at 17:15

## 2021-01-01 RX ADMIN — IPRATROPIUM BROMIDE 0.5 MG: 0.5 SOLUTION RESPIRATORY (INHALATION) at 19:14

## 2021-01-01 RX ADMIN — PROPOFOL 40 MCG/KG/MIN: 10 INJECTION, EMULSION INTRAVENOUS at 10:48

## 2021-01-01 RX ADMIN — FENTANYL CITRATE 25 MCG: 50 INJECTION INTRAMUSCULAR; INTRAVENOUS at 13:38

## 2021-01-01 RX ADMIN — ENOXAPARIN SODIUM 40 MG: 40 INJECTION SUBCUTANEOUS at 17:26

## 2021-01-01 RX ADMIN — SODIUM CHLORIDE, SODIUM GLUCONATE, SODIUM ACETATE, POTASSIUM CHLORIDE, MAGNESIUM CHLORIDE, SODIUM PHOSPHATE, DIBASIC, AND POTASSIUM PHOSPHATE 100 ML/HR: .53; .5; .37; .037; .03; .012; .00082 INJECTION, SOLUTION INTRAVENOUS at 07:37

## 2021-01-01 RX ADMIN — SENNOSIDES 8.8 MG: 8.8 SYRUP ORAL at 21:08

## 2021-01-01 RX ADMIN — AMLODIPINE BESYLATE 10 MG: 10 TABLET ORAL at 09:23

## 2021-01-01 RX ADMIN — LISINOPRIL 5 MG: 5 TABLET ORAL at 11:47

## 2021-01-01 RX ADMIN — HEPARIN SODIUM 5000 UNITS: 5000 INJECTION INTRAVENOUS; SUBCUTANEOUS at 14:18

## 2021-01-01 RX ADMIN — SODIUM CHLORIDE, SODIUM GLUCONATE, SODIUM ACETATE, POTASSIUM CHLORIDE, MAGNESIUM CHLORIDE, SODIUM PHOSPHATE, DIBASIC, AND POTASSIUM PHOSPHATE 100 ML/HR: .53; .5; .37; .037; .03; .012; .00082 INJECTION, SOLUTION INTRAVENOUS at 23:53

## 2021-01-01 RX ADMIN — HYDRALAZINE HYDROCHLORIDE 10 MG: 20 INJECTION, SOLUTION INTRAMUSCULAR; INTRAVENOUS at 22:09

## 2021-01-01 RX ADMIN — PANTOPRAZOLE SODIUM 40 MG: 40 INJECTION, POWDER, FOR SOLUTION INTRAVENOUS at 05:51

## 2021-01-01 RX ADMIN — FENTANYL CITRATE 25 MCG: 50 INJECTION INTRAMUSCULAR; INTRAVENOUS at 23:21

## 2021-01-01 RX ADMIN — GLYCOPYRROLATE 0.1 MG: 0.2 INJECTION, SOLUTION INTRAMUSCULAR; INTRAVENOUS at 22:55

## 2021-01-01 RX ADMIN — SODIUM CHLORIDE, SODIUM GLUCONATE, SODIUM ACETATE, POTASSIUM CHLORIDE, MAGNESIUM CHLORIDE, SODIUM PHOSPHATE, DIBASIC, AND POTASSIUM PHOSPHATE 100 ML/HR: .53; .5; .37; .037; .03; .012; .00082 INJECTION, SOLUTION INTRAVENOUS at 11:14

## 2021-01-01 RX ADMIN — ALTEPLASE 81 MG: KIT at 12:38

## 2021-01-01 RX ADMIN — LABETALOL 20 MG/4 ML (5 MG/ML) INTRAVENOUS SYRINGE 10 MG: at 12:30

## 2021-01-01 RX ADMIN — ASPIRIN 81 MG: 81 TABLET, CHEWABLE ORAL at 16:38

## 2021-01-01 RX ADMIN — HYDRALAZINE HYDROCHLORIDE 10 MG: 20 INJECTION, SOLUTION INTRAMUSCULAR; INTRAVENOUS at 09:45

## 2021-01-01 RX ADMIN — DEXMEDETOMIDINE 0.5 MCG/KG/HR: 100 INJECTION, SOLUTION, CONCENTRATE INTRAVENOUS at 17:30

## 2021-01-01 RX ADMIN — INSULIN LISPRO 2 UNITS: 100 INJECTION, SOLUTION INTRAVENOUS; SUBCUTANEOUS at 05:35

## 2021-01-01 RX ADMIN — VECURONIUM BROMIDE 10 MG: 1 INJECTION, POWDER, LYOPHILIZED, FOR SOLUTION INTRAVENOUS at 09:15

## 2021-01-01 RX ADMIN — SENNOSIDES 8.8 MG: 8.8 SYRUP ORAL at 17:28

## 2021-01-01 RX ADMIN — LEVALBUTEROL HYDROCHLORIDE 1.25 MG: 1.25 SOLUTION, CONCENTRATE RESPIRATORY (INHALATION) at 19:14

## 2021-01-01 RX ADMIN — ATORVASTATIN CALCIUM 40 MG: 40 TABLET, FILM COATED ORAL at 16:25

## 2021-01-01 RX ADMIN — PANTOPRAZOLE SODIUM 40 MG: 40 INJECTION, POWDER, FOR SOLUTION INTRAVENOUS at 08:30

## 2021-01-01 RX ADMIN — DEXMEDETOMIDINE 0.1 MCG/KG/HR: 100 INJECTION, SOLUTION, CONCENTRATE INTRAVENOUS at 11:19

## 2021-01-01 RX ADMIN — POTASSIUM CHLORIDE 20 MEQ: 20 SOLUTION ORAL at 16:21

## 2021-01-01 RX ADMIN — POTASSIUM CHLORIDE 40 MEQ: 20 SOLUTION ORAL at 10:03

## 2021-01-01 RX ADMIN — FUROSEMIDE 20 MG: 10 INJECTION, SOLUTION INTRAMUSCULAR; INTRAVENOUS at 08:30

## 2021-01-01 RX ADMIN — ASPIRIN 81 MG: 81 TABLET, CHEWABLE ORAL at 08:41

## 2021-01-01 RX ADMIN — POTASSIUM CHLORIDE 20 MEQ: 20 SOLUTION ORAL at 06:46

## 2021-01-01 RX ADMIN — LABETALOL 20 MG/4 ML (5 MG/ML) INTRAVENOUS SYRINGE 10 MG: at 18:04

## 2021-01-01 RX ADMIN — LEVALBUTEROL HYDROCHLORIDE 1.25 MG: 1.25 SOLUTION, CONCENTRATE RESPIRATORY (INHALATION) at 13:09

## 2021-01-01 RX ADMIN — HEPARIN SODIUM 5000 UNITS: 5000 INJECTION INTRAVENOUS; SUBCUTANEOUS at 21:23

## 2021-01-01 RX ADMIN — Medication 6 MG: at 21:23

## 2021-01-01 RX ADMIN — FUROSEMIDE 20 MG: 10 INJECTION, SOLUTION INTRAMUSCULAR; INTRAVENOUS at 09:11

## 2021-01-01 RX ADMIN — FUROSEMIDE 20 MG: 10 INJECTION, SOLUTION INTRAMUSCULAR; INTRAVENOUS at 08:41

## 2021-01-01 RX ADMIN — IPRATROPIUM BROMIDE 0.5 MG: 0.5 SOLUTION RESPIRATORY (INHALATION) at 07:45

## 2021-01-01 RX ADMIN — POLYETHYLENE GLYCOL 3350 17 G: 17 POWDER, FOR SOLUTION ORAL at 08:31

## 2021-01-01 RX ADMIN — HYDRALAZINE HYDROCHLORIDE 10 MG: 20 INJECTION INTRAMUSCULAR; INTRAVENOUS at 23:07

## 2021-01-01 RX ADMIN — HEPARIN SODIUM 5000 UNITS: 5000 INJECTION INTRAVENOUS; SUBCUTANEOUS at 14:32

## 2021-01-01 RX ADMIN — IOHEXOL 100 ML: 350 INJECTION, SOLUTION INTRAVENOUS at 10:35

## 2021-01-01 RX ADMIN — HYDROMORPHONE HYDROCHLORIDE 1 MG: 1 INJECTION, SOLUTION INTRAMUSCULAR; INTRAVENOUS; SUBCUTANEOUS at 10:21

## 2021-01-01 RX ADMIN — DEXMEDETOMIDINE 0.2 MCG/KG/HR: 100 INJECTION, SOLUTION, CONCENTRATE INTRAVENOUS at 17:21

## 2021-01-01 RX ADMIN — IPRATROPIUM BROMIDE 0.5 MG: 0.5 SOLUTION RESPIRATORY (INHALATION) at 19:31

## 2021-01-01 RX ADMIN — Medication 6 MG: at 21:00

## 2021-01-01 RX ADMIN — HYDROMORPHONE HYDROCHLORIDE 1 MG: 1 INJECTION, SOLUTION INTRAMUSCULAR; INTRAVENOUS; SUBCUTANEOUS at 10:00

## 2021-01-01 RX ADMIN — POTASSIUM CHLORIDE 20 MEQ: 20 SOLUTION ORAL at 20:57

## 2021-01-01 RX ADMIN — GLYCOPYRROLATE 0.1 MG: 0.2 INJECTION, SOLUTION INTRAMUSCULAR; INTRAVENOUS at 13:37

## 2021-01-01 RX ADMIN — HEPARIN SODIUM 5000 UNITS: 5000 INJECTION INTRAVENOUS; SUBCUTANEOUS at 05:52

## 2021-01-01 RX ADMIN — GLYCOPYRROLATE 0.1 MG: 0.2 INJECTION, SOLUTION INTRAMUSCULAR; INTRAVENOUS at 09:18

## 2021-01-01 RX ADMIN — HEPARIN SODIUM 5000 UNITS: 5000 INJECTION INTRAVENOUS; SUBCUTANEOUS at 05:49

## 2021-01-01 RX ADMIN — GLYCOPYRROLATE 0.1 MG: 0.2 INJECTION, SOLUTION INTRAMUSCULAR; INTRAVENOUS at 09:23

## 2021-01-01 RX ADMIN — HYDRALAZINE HYDROCHLORIDE 10 MG: 20 INJECTION, SOLUTION INTRAMUSCULAR; INTRAVENOUS at 10:03

## 2021-01-01 RX ADMIN — PROPOFOL 30 MCG/KG/MIN: 10 INJECTION, EMULSION INTRAVENOUS at 05:52

## 2021-01-01 RX ADMIN — FENTANYL CITRATE 25 MCG: 50 INJECTION INTRAMUSCULAR; INTRAVENOUS at 08:31

## 2021-01-01 RX ADMIN — FUROSEMIDE 20 MG: 10 INJECTION, SOLUTION INTRAMUSCULAR; INTRAVENOUS at 16:20

## 2021-01-01 RX ADMIN — METHYLPREDNISOLONE SODIUM SUCCINATE 40 MG: 40 INJECTION, POWDER, FOR SOLUTION INTRAMUSCULAR; INTRAVENOUS at 21:43

## 2021-01-01 RX ADMIN — HEPARIN SODIUM 5000 UNITS: 5000 INJECTION INTRAVENOUS; SUBCUTANEOUS at 21:08

## 2021-01-01 RX ADMIN — LEVALBUTEROL HYDROCHLORIDE 1.25 MG: 1.25 SOLUTION, CONCENTRATE RESPIRATORY (INHALATION) at 04:35

## 2021-01-01 RX ADMIN — ENOXAPARIN SODIUM 40 MG: 40 INJECTION SUBCUTANEOUS at 07:43

## 2021-01-01 RX ADMIN — ASPIRIN 81 MG: 81 TABLET, CHEWABLE ORAL at 09:20

## 2021-01-01 RX ADMIN — INSULIN LISPRO 2 UNITS: 100 INJECTION, SOLUTION INTRAVENOUS; SUBCUTANEOUS at 12:30

## 2021-01-01 RX ADMIN — INSULIN LISPRO 2 UNITS: 100 INJECTION, SOLUTION INTRAVENOUS; SUBCUTANEOUS at 00:30

## 2021-01-01 RX ADMIN — LABETALOL 20 MG/4 ML (5 MG/ML) INTRAVENOUS SYRINGE 10 MG: at 09:17

## 2021-01-01 RX ADMIN — HEPARIN SODIUM 5000 UNITS: 5000 INJECTION INTRAVENOUS; SUBCUTANEOUS at 13:09

## 2021-01-01 RX ADMIN — ASPIRIN 81 MG: 81 TABLET, CHEWABLE ORAL at 09:11

## 2021-01-01 RX ADMIN — IPRATROPIUM BROMIDE 0.5 MG: 0.5 SOLUTION RESPIRATORY (INHALATION) at 07:00

## 2021-01-01 RX ADMIN — ASPIRIN 81 MG: 81 TABLET, CHEWABLE ORAL at 08:31

## 2021-01-01 RX ADMIN — ACETYLCYSTEINE 600 MG: 200 SOLUTION ORAL; RESPIRATORY (INHALATION) at 19:03

## 2021-01-01 RX ADMIN — IOHEXOL 85 ML: 350 INJECTION, SOLUTION INTRAVENOUS at 12:21

## 2021-01-01 RX ADMIN — DEXAMETHASONE SODIUM PHOSPHATE 10 MG: 4 INJECTION INTRA-ARTICULAR; INTRALESIONAL; INTRAMUSCULAR; INTRAVENOUS; SOFT TISSUE at 13:26

## 2021-01-01 RX ADMIN — POTASSIUM CHLORIDE 20 MEQ: 20 SOLUTION ORAL at 21:49

## 2021-01-01 RX ADMIN — PANTOPRAZOLE SODIUM 40 MG: 40 INJECTION, POWDER, FOR SOLUTION INTRAVENOUS at 13:49

## 2021-01-01 RX ADMIN — IPRATROPIUM BROMIDE 0.5 MG: 0.5 SOLUTION RESPIRATORY (INHALATION) at 20:08

## 2021-01-01 RX ADMIN — LEVALBUTEROL HYDROCHLORIDE 1.25 MG: 1.25 SOLUTION, CONCENTRATE RESPIRATORY (INHALATION) at 00:30

## 2021-01-01 RX ADMIN — ACETYLCYSTEINE 600 MG: 200 SOLUTION ORAL; RESPIRATORY (INHALATION) at 08:41

## 2021-01-01 RX ADMIN — POTASSIUM CHLORIDE 40 MEQ: 20 SOLUTION ORAL at 22:33

## 2021-01-01 RX ADMIN — INSULIN LISPRO 8 UNITS: 100 INJECTION, SOLUTION INTRAVENOUS; SUBCUTANEOUS at 17:32

## 2021-01-01 RX ADMIN — LEVALBUTEROL HYDROCHLORIDE 1.25 MG: 1.25 SOLUTION, CONCENTRATE RESPIRATORY (INHALATION) at 19:03

## 2021-01-01 RX ADMIN — PROPOFOL 30 MCG/KG/MIN: 10 INJECTION, EMULSION INTRAVENOUS at 21:43

## 2021-01-01 RX ADMIN — LEVALBUTEROL HYDROCHLORIDE 1.25 MG: 1.25 SOLUTION, CONCENTRATE RESPIRATORY (INHALATION) at 13:28

## 2021-01-01 RX ADMIN — LEVALBUTEROL HYDROCHLORIDE 1.25 MG: 1.25 SOLUTION, CONCENTRATE RESPIRATORY (INHALATION) at 07:14

## 2021-01-01 RX ADMIN — IPRATROPIUM BROMIDE 0.5 MG: 0.5 SOLUTION RESPIRATORY (INHALATION) at 13:37

## 2021-01-01 RX ADMIN — METHYLPREDNISOLONE SODIUM SUCCINATE 40 MG: 40 INJECTION, POWDER, FOR SOLUTION INTRAMUSCULAR; INTRAVENOUS at 09:45

## 2021-01-01 RX ADMIN — PROPOFOL 30 MCG/KG/MIN: 10 INJECTION, EMULSION INTRAVENOUS at 17:22

## 2021-01-01 RX ADMIN — ATORVASTATIN CALCIUM 40 MG: 40 TABLET, FILM COATED ORAL at 16:30

## 2021-01-01 RX ADMIN — METHYLPREDNISOLONE SODIUM SUCCINATE 40 MG: 40 INJECTION, POWDER, FOR SOLUTION INTRAMUSCULAR; INTRAVENOUS at 09:20

## 2021-01-01 RX ADMIN — SODIUM CHLORIDE, SODIUM GLUCONATE, SODIUM ACETATE, POTASSIUM CHLORIDE, MAGNESIUM CHLORIDE, SODIUM PHOSPHATE, DIBASIC, AND POTASSIUM PHOSPHATE 100 ML/HR: .53; .5; .37; .037; .03; .012; .00082 INJECTION, SOLUTION INTRAVENOUS at 13:20

## 2021-01-01 RX ADMIN — MAGNESIUM SULFATE HEPTAHYDRATE 2 G: 40 INJECTION, SOLUTION INTRAVENOUS at 21:49

## 2021-01-01 RX ADMIN — POLYETHYLENE GLYCOL 3350 17 G: 17 POWDER, FOR SOLUTION ORAL at 09:11

## 2021-04-07 NOTE — PATIENT INSTRUCTIONS
Assessment/plan:  1  Shortness of breath-patient has had significant increase in shortness of breath  His pulse ox is 97% on room air with pulse of 72  He is tachypneic however and I am concerned with his weight gain of 28 lb in the past 5 months  Possibly related to congestive heart failure or significant ascites and liver failure given his history of alcohol abuse  Would recommend chest x-ray, echocardiogram, ultrasound abdomen and liver however patient declines and is agreeable to chest x-ray only at this time  His daughter states he may consider further testing once he is in assisted living facility  They also declined diuretic therapy as it may exacerbate his urinary incontinence  2  Benign essential hypertension-elevated today  Patient left the room and stated he was going back to the truck before we could discuss further intervention  3  Hyperlipidemia-status unknown  Recommend healthy diet and exercise  4  BPH-patient does have urinary incontinence  He has been noncompliant in taking alpha blocker therapy for this  5  Osteoarthritis of the hips-severe  Patient does not want any surgical intervention  Consider pain management however patient does not prefer to see any further specialist at this time

## 2021-04-07 NOTE — PROGRESS NOTES
Assessment and Plan:  Patient Instructions   Assessment/plan:  1  Shortness of breath-patient has had significant increase in shortness of breath  His pulse ox is 97% on room air with pulse of 72  He is tachypneic however and I am concerned with his weight gain of 28 lb in the past 5 months  Possibly related to congestive heart failure or significant ascites and liver failure given his history of alcohol abuse  Would recommend chest x-ray, echocardiogram, ultrasound abdomen and liver however patient declines and is agreeable to chest x-ray only at this time  His daughter states he may consider further testing once he is in assisted living facility  They also declined diuretic therapy as it may exacerbate his urinary incontinence  2  Benign essential hypertension-elevated today  Patient left the room and stated he was going back to the truck before we could discuss further intervention  3  Hyperlipidemia-status unknown  Recommend healthy diet and exercise  4  BPH-patient does have urinary incontinence  He has been noncompliant in taking alpha blocker therapy for this  5  Osteoarthritis of the hips-severe  Patient does not want any surgical intervention  Consider pain management however patient does not prefer to see any further specialist at this time        Problem List Items Addressed This Visit        Cardiovascular and Mediastinum    Essential hypertension - Primary       Musculoskeletal and Integument    Osteoarthritis of both hips       Genitourinary    Benign prostatic hyperplasia with post-void dribbling       Other    Mixed hyperlipidemia      Other Visit Diagnoses     Shortness of breath        Relevant Orders    XR chest pa & lateral                 Diagnoses and all orders for this visit:    Essential hypertension    Mixed hyperlipidemia    Benign prostatic hyperplasia with post-void dribbling    Primary osteoarthritis of both hips    Shortness of breath  -     XR chest pa & lateral; Future              Subjective:      Patient ID: Jayesh Denis is a 79 y o  male  CC:    Chief Complaint   Patient presents with    Follow-up     patient is here for a 6 month f/u re: chronic medical conditions  patients daughter thinks patient has worsening SOB  ak       HPI:    HPI:  This is a 52-year-old gentleman that presents to the office for follow-up of chronic health conditions including benign essential hypertension, hyperlipidemia, BPH, and osteoarthritis  He has significant pain in his hips bilaterally from his arthritis and has difficulty sitting for any period of time  His daughter feels that he has become more short of breath in the past month or 2  He does have a prior history of alcohol abuse and he has gained 28 lb in the past 5 months on the scale  His daughter states that he drinks milk and eats honey buns all day long  When discussing test I would like to get to rule out ascites, liver failure, congestive heart failure, patient was hesitant to go for all testing and would have difficulty getting to and from visits  He was agreeable to go for chest x-ray  His daughter is planning on having them move into assisted living in the next 2 months and would like to get further testing done once that has occurred  The following portions of the patient's history were reviewed and updated as appropriate: allergies, current medications, past family history, past medical history, past social history, past surgical history and problem list       Review of Systems   Constitutional: Negative for chills, fatigue and fever  HENT: Negative for congestion, ear pain and sinus pressure  Eyes: Negative for visual disturbance  Respiratory: Negative for cough, chest tightness and shortness of breath  Cardiovascular: Negative for chest pain and palpitations  Gastrointestinal: Negative for diarrhea, nausea and vomiting  Endocrine: Negative for polyuria     Genitourinary: Negative for dysuria and frequency  Musculoskeletal: Negative for arthralgias and myalgias  Skin: Negative for pallor and rash  Neurological: Negative for dizziness, weakness, light-headedness, numbness and headaches  Psychiatric/Behavioral: Negative for agitation, behavioral problems and sleep disturbance  All other systems reviewed and are negative  Data to review:       Objective:    Vitals:    04/07/21 1354   BP: 156/80   Pulse: 80   Temp: 98 2 °F (36 8 °C)   Weight: 128 kg (283 lb)   Height: 5' 10" (1 778 m)        Physical Exam  Constitutional:       General: He is not in acute distress  Appearance: He is well-developed  HENT:      Head: Normocephalic and atraumatic  Right Ear: Tympanic membrane normal       Left Ear: Tympanic membrane normal    Eyes:      Conjunctiva/sclera: Conjunctivae normal    Neck:      Musculoskeletal: Normal range of motion  Cardiovascular:      Rate and Rhythm: Normal rate and regular rhythm  Pulmonary:      Effort: Pulmonary effort is normal    Abdominal:      General: Abdomen is flat  Bowel sounds are normal  There is no distension  Palpations: Abdomen is soft  There is no mass  Musculoskeletal: Normal range of motion  Skin:     General: Skin is warm  Findings: No rash  Neurological:      Mental Status: He is alert and oriented to person, place, and time     Psychiatric:         Mood and Affect: Mood normal

## 2021-06-04 NOTE — PATIENT INSTRUCTIONS
Tried to contact his daughter Yinka Kraus in the office today and was unable to get a hold of her  Patient is unable to provide any information    Per his  Primary care physicians note,  he does not want surgery or to see a specialist    For any further intervention needed for his left hip pain , he should consider seeing pain management: referral was given out today for Pain management    Recommend physical therapy for gentle  stretches to preserve his motion  Only needs follow-up if wants to consider surgery

## 2021-06-04 NOTE — PROGRESS NOTES
Assessment/Plan     1  Primary osteoarthritis of left hip      Orders Placed This Encounter   Procedures    Ambulatory referral to Pain Management     · Patient has severe left hip osteoarthritis  · Patient appears to be disoriented in the room today   ·   Tried to contact his daughter Yaakov Wheatley in the office today and was unable to get a hold of her  ·  Patient is unable to provide any information  Per his  Primary care physicians note,  he does not want surgery or to see a specialist    · For any further intervention needed for his left hip pain , he should consider seeing pain management: referral was given out today for Pain management   ·  Recommend physical therapy for gentle  stretches to preserve his motion      Return for appt as needed       I answered all of the patient's questions during the visit and provided education of the patient's condition during the visit  The patient verbalized understanding of the information given and agrees with the plan  This note was dictated using [employer] software  It may contain errors including improperly dictated words  Please contact physician directly for any questions  Subjective   Chief Complaint:   Chief Complaint   Patient presents with    Left Hip - Follow-up       HPI:  Kimmy Mireles is a 79 y o  male who presents for follow up for left hip pain due to severe osteoarthritis  He currently resides at [facility]  He is present in a wheelchair but does use a walker at the nursing facility  Patient appears to be disoriented in the room today  He is unsure if the pain is limiting him from his daily activities  He is taking Tylenol as needed for pain with some relief  Review of Systems  See HPI for musculoskeletal review  All other systems reviewed are negative     History:  No past medical history on file    Past Surgical History:   Procedure Laterality Date    KNEE SURGERY       Social History   Social History     Substance and Sexual Activity Alcohol Use No     Social History     Substance and Sexual Activity   Drug Use No     Social History     Tobacco Use   Smoking Status Former Smoker   Smokeless Tobacco Never Used     Family History:   Family History   Problem Relation Age of Onset    Pneumonia Father        Current Outpatient Medications on File Prior to Visit   Medication Sig Dispense Refill    acetaminophen (TYLENOL) 325 mg tablet Take 650 mg by mouth every 6 (six) hours as needed      calcium carbonate (TUMS) 500 mg chewable tablet Chew 2 tablets daily      nitrofurantoin (MACROBID) 100 mg capsule Take 1 tablet twice daily with food (Patient not taking: Reported on 4/7/2021) 14 capsule 0    terazosin (HYTRIN) 5 mg capsule Take 1 capsule (5 mg total) by mouth daily at bedtime (Patient not taking: Reported on 4/7/2021) 30 capsule 5     No current facility-administered medications on file prior to visit        No Known Allergies     Objective     /87   Pulse 67   Temp 97 8 °F (36 6 °C)   Ht 5' 10" (1 778 m)   Wt 128 kg (283 lb)   BMI 40 61 kg/m²      PE:  AAOx 3  WDWN  Hearing intact, no drainage from eyes  no audible wheezing  no abdominal distension  LE compartments soft, skin intact    left hip:   No dislocation/deformity  ROM: flexion 95 with pain   IR: 0 with no pain   ER: 0-20 with no pain     AT/GS intact    Scribe Attestation    I,:  Juan Jose Henriquez am acting as a scribe while in the presence of the attending physician :       I,:  Hayden Ambrose DO personally performed the services described in this documentation    as scribed in my presence :

## 2021-06-09 NOTE — TELEPHONE ENCOUNTER
Jenny, patient's daughter is calling stating that she is not on his communication release  She is asking where her dad was seen on Friday  She is also asking if we referred her dad to [facility]  Someone from that office called her & stated that patient did not want treatment  Jenny is asking for clarification if the referral to [facility] was for an injection  Jenny is stating that she scheduled with Dr Aquiles Roche for an injection  Jenny has no idea what is going on because no one will give her any information about him       [phone]

## 2021-06-09 NOTE — TELEPHONE ENCOUNTER
I called [facility] and asked if they could get a msg to patient to call office to give his verbal consent to speak to Dhara Barrera, his daughter, on his behalf  Awaiting response  Is injection the goal for Pain [facility]gt?

## 2021-08-31 PROBLEM — R19.7 DIARRHEA: Status: ACTIVE | Noted: 2021-01-01
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
## 2021-08-31 PROBLEM — R06.02 SHORTNESS OF BREATH: Status: ACTIVE | Noted: 2021-01-01

## 2021-08-31 PROBLEM — A41.9 SEPSIS (HCC): Status: ACTIVE | Noted: 2021-01-01

## 2021-08-31 PROBLEM — R77.8 ELEVATED TROPONIN: Status: ACTIVE | Noted: 2021-01-01

## 2021-08-31 PROBLEM — J39.8 TRACHEOMALACIA: Status: ACTIVE | Noted: 2021-01-01

## 2021-08-31 NOTE — ASSESSMENT & PLAN NOTE
·  History of severe right-sided and moderate left-sided OA of hip, denies recent surgeries  · Uses walker at baseline  · Patient denies any pain/ trauma to hips

## 2021-08-31 NOTE — ASSESSMENT & PLAN NOTE
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

## 2021-08-31 NOTE — ASSESSMENT & PLAN NOTE
· Patient arrived to ED s/p  Fall, reports he has been having shortness of breath recently of unknown etiology, denies known history of CHF, COPD  No known history of stroke, PE/DVT, not on blood thinners  Patient's daughter reports he did receive COVID vaccine  · Recent family medicine outpatient notes show that there was concern for recent weight gain, suspect possible CHF  · Suspect CHF versus aspiration PNA versus tracheomalacia as etiology  · Patient sats >92% on RA, tachypneic at 24-30 breaths per minute  · Venous blood gas not concerning  · COVID negative  · Troponin x1: 0 05, will trend  · Patient denies chest pain, EKG without signs of ischemia  · D-dimer: Elevated at 0 71  · BNP slightly elevated at 673  · CT PE study: Suboptimal opacification of the distal pulmonary arteries  No gross central pulmonary embolus is seen  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

## 2021-08-31 NOTE — ASSESSMENT & PLAN NOTE
· Patient reports having a fall this a m , witnessed by patient's wife, history provided by EMS and patient  Patient was attempting to walk after trying to hold on to refrigerator, fell backwards and hit his head, lost consciousness for 30 seconds  Denies any other recent falls, denies cause for fall    · CT head: no acute findings, microangiopathic changes  · CT c-spine: no fracture or trauma  · Neuro checks  · Fall precautions  · PT/OT evaluation

## 2021-08-31 NOTE — CONSULTS
Progress Note - Pulmonary   Limmie Mandy 79 y o  male MRN: [id number]  Unit/Bed#: ED 16 Encounter: [id number]      Assessment:  Shortness of breath 2/2 COPD exacerbation vs acute bronchitis     Plan:  - CT notable for saber-sheath trachea, pathognomonic for COPD   - Given significant smoking history, suspect aspect of COPD could be tying in here   - Leukocytosis on presentation with WBC at 12 44 and negative infiltrates on CT, raise the possibility of acute bronchitis   - Continue trending WBC, obtain sputum culture if able   - Start steroids  - Start duo-nebs    - Place on BiPAP 12/5, continue through the night   - Continue on telemetry, monitor for further PVC's or evidence of arrhythmia    Subjective:   Patient seen and examined at the bedside  Generally, history is more difficult to obtain as he gives very little response and seems distracted  However, according to the patient, he came to the hospital after having a fall at home which occurred while he was leaving the bathroom  Patient was not experiencing any chest pain, palpitations, SOB, or vision changes at that time  He says he suddenly felt weak and felt to the ground on his face  Patient was able to bring himself back up to standing  Patient denies LOC, inconsistent with the EMS report  According to the EMS report, patient's fall was witnessed by his wife at home  Patient was trying to walk after holding on to the refrigerator when he fell backwards, hit his head, and lost consciousness for ~30 seconds  On presentation, patient's daughter reported to the primary team that the patient also had 1 episode of non-bloody diarrhea this morning  Of note, patient also complained of experiencing shortness of breath earlier in the day, but denies this claim now       PMH: HTN, HLD, BPH, OA b/l hips  Social: former smoker (quit 10 years ago, 10 pack years), significant EtOH use in the past, denies drug use     Objective:     Vitals: Blood pressure (!) 198/82, pulse 76, temperature 98 °F (36 7 °C), resp  rate (!) 31, weight 128 kg (283 lb), SpO2 95 %  , Body mass index is 40 61 kg/m²  No intake or output data in the 24 hours ending 08/31/21 1522      Physical Exam  Gen: Awake, alert, oriented x 3, mild-mod distress, shifting uncomfortably in bed  HEENT: Mucous membranes moist, no oral lesions, no thrush, hard of hearing  NECK: No accessory muscle use, JVP not elevated  Cardiac: Regular, single S1, single S2, no murmurs, no rubs, no gallops  Lungs: increased respiratory effort, tachypneic, lung sounds hard to appreciate with upper respiratory secretions, b/l crackles   Abdomen: normoactive bowel sounds, tender to palpation in epigastric region, distended, no rebound or rigidity, no guarding  Extremities: no cyanosis, no clubbing, no edema  Skin: abrasion with dried blood on left hand   Neuro: speech is gargled  Psych: somewhat distracted during conversation    Labs:   Laboratory and Diagnostics  Results from last 7 days   Lab Units 08/31/21  0735   WBC Thousand/uL 12 44*   HEMOGLOBIN g/dL 14 9   HEMATOCRIT % 45 0   PLATELETS Thousands/uL 264   NEUTROS PCT % 86*   MONOS PCT % 8     Results from last 7 days   Lab Units 08/31/21  0735   SODIUM mmol/L 139   POTASSIUM mmol/L 4 4   CHLORIDE mmol/L 107   CO2 mmol/L 24   ANION GAP mmol/L 8   BUN mg/dL 19   CREATININE mg/dL 1 06   CALCIUM mg/dL 8 6   GLUCOSE RANDOM mg/dL 116   ALT U/L 18   AST U/L 27   ALK PHOS U/L 144*   ALBUMIN g/dL 3 4*   TOTAL BILIRUBIN mg/dL 0 86               Results from last 7 days   Lab Units 08/31/21  0735   TROPONIN I ng/mL 0 05*                     Results from last 7 days   Lab Units 08/31/21  0819   D-DIMER QUANTITATIVE ug/ml FEU 0 71*              ABG:     Venous gas: 7 406/38 9     Micro:  SARS COV-2 negative     Imaging and other studies:  I personally reviewed all of the following imaging studies       CT spine cervical without contrast  Date: 08/31/21  Impression: No C spine fracture or traumatic malalignment     CT head without contrast  Date: 08/31/21  Impression: No acute intracranial abnormality  Microangiopathic changes  XR Chest 1 view portable  Date: 08/31/21  Impression: Minimal bibasilar linear atelectasis  No focal consolidation, pleural effusion, or pneumothorax      CT PE study   Date: 08/31/21   Impression: Suboptimal opacification of the distal pulmonary arteries  No gross central pulmonary embolus is seen   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

## 2021-08-31 NOTE — ASSESSMENT & PLAN NOTE
· Patient reports 1 episode of diarrhea this a m , non-bloody, denies nausea/vomiting  · Patient denies any other episodes, continue monitoring

## 2021-08-31 NOTE — ED PROVIDER NOTES
History  Chief Complaint   Patient presents with   Mick Brooks     pt comes to ed via ems after having a witnessed fall by his wife at [facility]  pt hit his head and lost conciousness    Shortness of Breath     upon ems arrival pt was confused diaphoretic with labored breathing  78 yo M with h/o HTN, HLD presenting after a witnessed fall and syncopal event today at home  Lives at [facility] [place] with his wife, she reports he was clutching onto the fridge and then collapsed  Was unresponsive for perhaps 30 seconds  No seizure like activity witnessed  VS were stable for EMS  Patient reports his breathing is unlabored however is using his abdominal muscles to breathe, maintaining saturations >90 for EMS on 2 L NC  Patient offers no complaints  Denies any current chest pain or sob  According to patient's daughter, he does not usually speak much  At times he will "just make eye contact" and give one worded replies which is not new or unusual for him, currently this is what he is doing  She also reports that intermittently he will have trouble breathing or have noisy breathing, had been seen by a physician who found no abnormalities on exam, was unclear at that time what was causing his symptoms  History provided by:  Patient and EMS personnel   used: No        Prior to Admission Medications   Prescriptions Last Dose Informant Patient Reported?  Taking?   acetaminophen (TYLENOL) 325 mg tablet Not Taking at Unknown time Child Yes No   Sig: Take 650 mg by mouth every 6 (six) hours as needed   Patient not taking: Reported on 8/31/2021   calcium carbonate (TUMS) 500 mg chewable tablet Not Taking at Unknown time  Yes No   Sig: Chew 2 tablets daily   Patient not taking: Reported on 8/31/2021   nitrofurantoin (MACROBID) 100 mg capsule Not Taking at Unknown time Child No No   Sig: Take 1 tablet twice daily with food   Patient not taking: Reported on 4/7/2021 terazosin (HYTRIN) 5 mg capsule Not Taking at Unknown time  No No   Sig: Take 1 capsule (5 mg total) by mouth daily at bedtime   Patient not taking: Reported on 4/7/2021      Facility-Administered Medications: None       History reviewed  No pertinent past medical history  Past Surgical History:   Procedure Laterality Date    KNEE SURGERY         Family History   Problem Relation Age of Onset    Pneumonia Father      I have reviewed and agree with the history as documented  E-Cigarette/Vaping    E-Cigarette Use Never User      E-Cigarette/Vaping Substances    Nicotine No     THC No     CBD No     Flavoring No     Other No     Unknown No      Social History     Tobacco Use    Smoking status: Former Smoker    Smokeless tobacco: Never Used   Vaping Use    Vaping Use: Never used   Substance Use Topics    Alcohol use: No    Drug use: No        Review of Systems   Constitutional: Negative for chills, fatigue and fever  HENT: Negative for congestion and rhinorrhea  Eyes: Negative for visual disturbance  Respiratory: Positive for shortness of breath (patient denies, however according to daugther does have intermittnet SOB)  Negative for cough and wheezing  Cardiovascular: Negative for chest pain and palpitations  Gastrointestinal: Negative for abdominal pain, diarrhea, nausea and vomiting  Genitourinary: Negative for dysuria and hematuria  Musculoskeletal: Negative for back pain, myalgias and neck pain  Skin: Negative for pallor and rash  Neurological: Positive for syncope  Negative for light-headedness and headaches  Psychiatric/Behavioral: Negative for confusion  The patient is not nervous/anxious  All other systems reviewed and are negative        Physical Exam  ED Triage Vitals [08/31/21 0725]   Temperature Pulse Respirations Blood Pressure SpO2   98 °F (36 7 °C) 86 (!) 26 142/69 93 %      Temp src Heart Rate Source Patient Position - Orthostatic VS BP Location FiO2 (%)   -- Monitor -- -- --      Pain Score       --             Orthostatic Vital Signs  Vitals:    08/31/21 0725   BP: 142/69   Pulse: 86       Physical Exam  Vitals and nursing note reviewed  Constitutional:       General: He is not in acute distress  Appearance: He is ill-appearing  He is not diaphoretic  HENT:      Head: Normocephalic and atraumatic  Mouth/Throat:      Mouth: Mucous membranes are moist    Eyes:      Extraocular Movements: Extraocular movements intact  Pupils: Pupils are equal, round, and reactive to light  Cardiovascular:      Rate and Rhythm: Normal rate and regular rhythm  Pulses: Normal pulses  Heart sounds: No murmur heard  Pulmonary:      Effort: Tachypnea and accessory muscle usage present  Breath sounds: No decreased breath sounds, wheezing, rhonchi or rales  Comments: Upper airway transmitted sounds intermittently, patient intermittently voluntarily groaning on exhalation  Chest:      Chest wall: No edema  Abdominal:      Palpations: Abdomen is soft  Tenderness: There is no abdominal tenderness  There is no guarding  Musculoskeletal:         General: Normal range of motion  Cervical back: Normal range of motion and neck supple  Right lower leg: No tenderness  No edema  Left lower leg: No tenderness  No edema  Skin:     General: Skin is warm and dry  Neurological:      General: No focal deficit present  Mental Status: He is alert and oriented to person, place, and time  Cranial Nerves: No cranial nerve deficit  Motor: No weakness  Psychiatric:         Mood and Affect: Mood normal          Behavior: Behavior is not agitated           ED Medications  Medications - No data to display    Diagnostic Studies  Results Reviewed     Procedure Component Value Units Date/Time    Procalcitonin Reflex [[id number]]  (Normal) Collected: 09/01/21 1019    Lab Status: Final result Specimen: Blood from Arm, Left Updated: 09/01/21 1122     Procalcitonin 0 08 ng/ml     UA w Reflex to Microscopic w Reflex to Culture [[id number]] Collected: 09/01/21 0742    Lab Status: No result Specimen: Urine, Other     Troponin I [[id number]]  (Abnormal) Collected: 08/31/21 1904    Lab Status: Final result Specimen: Blood from Arm, Right Updated: 08/31/21 1932     Troponin I 0 09 ng/mL     Hemoglobin A1C [[id number]]  (Abnormal) Collected: 08/31/21 0735    Lab Status: Final result Specimen: Blood from Arm, Right Updated: 08/31/21 1701     Hemoglobin A1C 5 8 %       mg/dl     Troponin I [[id number]]  (Abnormal) Resulted: 08/31/21 1655    Lab Status: Final result Specimen: Blood Updated: 08/31/21 1655     Troponin I 0 12 ng/mL     Procalcitonin [[id number]]  (Normal) Collected: 08/31/21 1555    Lab Status: Final result Specimen: Blood from Arm, Right Updated: 08/31/21 1651     Procalcitonin <0 05 ng/ml     TSH, 3rd generation [[id number]]  (Normal) Collected: 08/31/21 0735    Lab Status: Final result Specimen: Blood from Arm, Right Updated: 08/31/21 1640     TSH 3RD GENERATON 1 810 uIU/mL     Narrative:      Patients undergoing fluorescein dye angiography may retain small amounts of fluorescein in the body for 48-72 hours post procedure  Samples containing fluorescein can produce falsely depressed TSH values  If the patient had this procedure,a specimen should be resubmitted post fluorescein clearance        Troponin I [[id number]] Collected: 08/31/21 1611    Lab Status: No result Specimen: Blood from Arm, Right     Lipid panel [[id number]]  (Abnormal) Collected: 08/31/21 0735    Lab Status: Final result Specimen: Blood from Arm, Right Updated: 08/31/21 1558     Cholesterol 219 mg/dL      Triglycerides 80 mg/dL      HDL, Direct 52 mg/dL      LDL Calculated 151 mg/dL      Non-HDL-Chol (CHOL-HDL) 167 mg/dl     Platelet count [[id number]]     Lab Status: No result Specimen: Blood     NT-BNP PRO [[id number]]  (Abnormal) Collected: 08/31/21 0735    Lab Status: Final result Specimen: Blood from Arm, Right Updated: 08/31/21 1227     NT-proBNP 673 pg/mL     Novel Coronavirus (Covid-19),PCR [facility] [[id number]]  (Normal) Collected: 08/31/21 0735    Lab Status: Final result Specimen: Nares from Nose Updated: 08/31/21 0855     SARS-CoV-2 Negative    Narrative:           D-dimer, quantitative [[id number]]  (Abnormal) Collected: 08/31/21 0819    Lab Status: Final result Specimen: Blood from Arm, Right Updated: 08/31/21 0853     D-Dimer, Quant 0 71 ug/ml FEU     Troponin I [[id number]]  (Abnormal) Collected: 08/31/21 0735    Lab Status: Final result Specimen: Blood from Arm, Right Updated: 08/31/21 0844     Troponin I 0 05 ng/mL     Comprehensive metabolic panel [[id number]]  (Abnormal) Collected: 08/31/21 0735    Lab Status: Final result Specimen: Blood from Arm, Right Updated: 08/31/21 0807     Sodium 139 mmol/L      Potassium 4 4 mmol/L      Chloride 107 mmol/L      CO2 24 mmol/L      ANION GAP 8 mmol/L      BUN 19 mg/dL      Creatinine 1 06 mg/dL      Glucose 116 mg/dL      Calcium 8 6 mg/dL      Corrected Calcium 9 1 mg/dL      AST 27 U/L      ALT 18 U/L      Alkaline Phosphatase 144 U/L      Total Protein 7 6 g/dL      Albumin 3 4 g/dL      Total Bilirubin 0 86 mg/dL      eGFR 66 ml/min/1 73sq m     Narrative:      Jr guidelines for Chronic Kidney Disease (CKD):     Stage 1 with normal or high GFR (GFR > 90 mL/min/1 73 square meters)    Stage 2 Mild CKD (GFR = 60-89 mL/min/1 73 square meters)    Stage 3A Moderate CKD (GFR = 45-59 mL/min/1 73 square meters)    Stage 3B Moderate CKD (GFR = 30-44 mL/min/1 73 square meters)    Stage 4 Severe CKD (GFR = 15-29 mL/min/1 73 square meters)    Stage 5 End Stage CKD (GFR <15 mL/min/1 73 square meters)  Note: GFR calculation is accurate only with a steady state creatinine    Blood gas, venous [[id number]]  (Abnormal) Collected: 08/31/21 0735    Lab Status: Final result Specimen: Blood from Arm, Right Updated: 08/31/21 0750     pH, Denny 7 406     pCO2, Denny 38 9 mm Hg      pO2, Denny 54 9 mm Hg      HCO3, Denny 23 9 mmol/L      Base Excess, Denny -0 6 mmol/L      O2 Content, Denny 18 9 ml/dL      O2 HGB, VENOUS 86 2 %     CBC and differential [[id number]]  (Abnormal) Collected: 08/31/21 0735    Lab Status: Final result Specimen: Blood from Arm, Right Updated: 08/31/21 0744     WBC 12 44 Thousand/uL      RBC 5 01 Million/uL      Hemoglobin 14 9 g/dL      Hematocrit 45 0 %      MCV 90 fL      MCH 29 7 pg      MCHC 33 1 g/dL      RDW 14 1 %      MPV 10 4 fL      Platelets 682 Thousands/uL      nRBC 0 /100 WBCs      Neutrophils Relative 86 %      Immat GRANS % 0 %      Lymphocytes Relative 6 %      Monocytes Relative 8 %      Eosinophils Relative 0 %      Basophils Relative 0 %      Neutrophils Absolute 10 65 Thousands/µL      Immature Grans Absolute 0 04 Thousand/uL      Lymphocytes Absolute 0 78 Thousands/µL      Monocytes Absolute 0 95 Thousand/µL      Eosinophils Absolute 0 00 Thousand/µL      Basophils Absolute 0 02 Thousands/µL                  CT head wo contrast   Final Result by Chaitanya Rodriguez MD (09/02 1337)      Acute infarct in the right basal ganglia/corona radiata  No acute hemorrhage or mass effect  Chronic microangiopathic changes  Chronic lacunar infarcts left basal ganglia/corona radiata  The study was marked in EPIC for significant notification  Workstation performed: [id number]         XR chest portable ICU   Final Result by Cary Recinos DO (09/02 1041)      Life-support tubes as above  Mild elevation left diaphragm without acute cardiopulmonary disease  Workstation performed: [id number]         XR chest portable ICU   Final Result by Cary Recinos DO (09/02 1028)      No definite acute cardiopulmonary disease on this study slightly limited by low lung volumes                    Workstation performed: [id number]         CTA stroke alert (head/neck)   Final Result by [employer] [facility], DO (09/01 1238)      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 series 3 image 166  Multiple sites of intracranial atherosclerosis with stenosis along the right A2, right M1, and distal M3 on the right  I personally discussed this study with Dr Eloy Aranda on 9/1/2021 at 12:31 PM                         Workstation performed: [id number]         CT stroke alert brain   Final Result by [facility], DO (09/01 1229)      No acute intracranial abnormality  Microangiopathic changes  Chronic lacunar infarcts left lentiform nucleus and centrum semiovale  I personally discussed this study with Dr Eloy Aranda on 9/1/2021 at 12:22 PM                Workstation performed: [id number]         VAS lower limb venous duplex study, complete bilateral   Final Result by Leonila Josue MD (09/01 1128)      CTA ED chest PE Study   Final Result by Perri Lagos MD (08/31 1113)         1  Suboptimal opacification of the distal pulmonary arteries  No gross central pulmonary embolus is seen  2   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  Workstation performed: [id number]         CT head without contrast   Final Result by Tiffanie Smith DO (08/31 1843)      No acute intracranial abnormality  Microangiopathic changes  Workstation performed: [id number]         CT spine cervical without contrast   Final Result by Tiffanie Smith DO (08/31 0840)      No cervical spine fracture or traumatic malalignment  Workstation performed: [id number]         XR chest 1 view portable   Final Result by Gavi Valles MD (08/31 8281)      No focal consolidation, pleural effusion, or pneumothorax                    Workstation performed: [id number]         MRI Inpatient Order    (Results Pending)         Procedures  ECG 12 Lead Documentation Only    Date/Time: 8/31/2021 9:46 AM  Performed by: Charlotte Crocker MD  Authorized by: Charlotte Crocker MD     Indications / Diagnosis:  Syncope  Patient location:  ED  Previous ECG:     Previous ECG:  Unavailable  Interpretation:     Interpretation: abnormal    Rate:     ECG rate assessment: normal    Rhythm:     Rhythm: sinus rhythm    Ectopy:     Ectopy: none    QRS:     QRS axis:  Left  Conduction:     Conduction: abnormal      Abnormal conduction: non-specific intraventricular conduction delay    ST segments:     ST segments:  Non-specific    Depression:  V5, V6 and V2  T waves:     T waves: normal    Comments:      NSR, nonspecific conduction block, LAD  No prior ECGs  Nonspecific slight ST segment depressions  ED Course  ED Course as of Sep 02 1734   Tue Aug 31, 2021   2422 According to daughter, patient usually just "makes eye contact" doesn't usually talk to his daughter much  Wife stating that he lost consciousness when he fell  He was diaphoretic at that time as well, "white as a ghost" and having diarrhea  Does have a history of "heavy breathing" in the past but nothing was wrong  0751 pCO2, Denny(!): 38 9   0751 Neutrophils %(!): 86   0846 Troponin I(!): 0 05   0905 Negative age adjusted d-dimer                                 SBIRT 22yo+      Most Recent Value   SBIRT (22 yo +)   In order to provide better care to our patients, we are screening all of our patients for alcohol and drug use  Would it be okay to ask you these screening questions? Unable to answer at this time Filed at: 08/31/2021 0746                MDM  Number of Diagnoses or Management Options  Elevated troponin  Syncope  Diagnosis management comments: 80 yo M presenting for a fall as well as syncopal episode, noted to be tachypneic and requiring 2 L of NC  Patient's mental status is apparently at baseline according to patient's daughter who states at times she will go over to visit with him and he will not speak a word to her, however he does answer questions today and is oriented   She also confirms that he has a history of breathing issues although she's not sure from what  Maintaining airway at this time  Given syncope and increased WOB, d-dimer obtained which is slightly positive, CTA PE study obtained which does not show PE but does reveal tracheomalacia which may explain patient's history of breathing trouble, currently maintaining airway and saturations although has intermittent noisy breathing while in the ED  ECG nonspecific, no prior to compare to  Patient does have elevated troponin of 0 05  Traumatic workup unremarkable including CTH and c-spine  Will require admission to medicine service with telemetry due to syncopal event, breathing status, and elevated troponin  Disposition  Final diagnoses:   Syncope   Elevated troponin     Time reflects when diagnosis was documented in both MDM as applicable and the Disposition within this note     Time User Action Codes Description Comment    8/31/2021 11:41 AM Leon White Add [R55] Syncope     8/31/2021 11:41 AM Leon White Add [R77 8] Elevated troponin     8/31/2021  2:10 PM Cynthia Grimm Add [R06 02] Shortness of breath     8/31/2021  2:17 PM Clare Dooley Add [J39 8] Tracheomalacia     9/1/2021  2:36 PM Cassie Bermeo Add [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 [facility])       ED Disposition     ED Disposition Condition Date/Time Comment    Admit Stable Tue Aug 31, 2021 11:41 AM Case was discussed with RAFITA Calloway and the patient's admission status was agreed to be Admission Status: inpatient status to the service of Dr Nelson Barron   Follow-up Information    None         Patient's Medications   Discharge Prescriptions    No medications on file     No discharge procedures on file  PDMP Review     None           ED Provider  Attending physically available and evaluated Meir Malika RHODES managed the patient along with the ED Attending      Electronically Signed by         Medardo Thayer MD  09/02/21 0083

## 2021-08-31 NOTE — ASSESSMENT & PLAN NOTE
·  History of BPH, was previously taking terazosin, patient stopped taking  ·  Patient denies any difficulty urinating, dysuria, itching/burning, dribbling  ·  UA pending

## 2021-08-31 NOTE — ED ATTENDING ATTESTATION
8/31/2021  IMarco MD, saw and evaluated the patient  I have discussed the patient with the resident/non-physician practitioner and agree with the resident's/non-physician practitioner's findings, Plan of Care, and MDM as documented in the resident's/non-physician practitioner's note, except where noted  All available labs and Radiology studies were reviewed  I was present for key portions of any procedure(s) performed by the resident/non-physician practitioner and I was immediately available to provide assistance  At this point I agree with the current assessment done in the Emergency Department  I have conducted an independent evaluation of this patient a history and physical is as follows:    Patient presents the emergency department after a fall  The history is provided by the patient and EMS  The patient is not accompanied by any family or friends  According the patient EMS the patient fell to the ground after trying to hold onto refrigerator prior to the fall  The patient had a loss of consciousness for approximately 30 seconds  At this point time the patient denies any injury or any complaint  The patient states he fell but has no pain at this point time  The patient also denies shortness of breath  The patient specifically denied chest pain, abdominal pain, head pain, neck pain, or back pain  The patient denies any extremity injuries  Physical exam demonstrates a male patient in no apparent distress  There is no evidence of craniofacial trauma  The neck was supple nontender  The back was nontender  All extremities are nontender with full range of motion  The chest was nontender  Heart had a regular rate rhythm  The lungs demonstrated mild tachypnea and scattered crackles without wheezes  The abdomen is soft and nontender  The patient was alert oriented x3 without focal neurologic deficit      ED Course         Critical Care Time  Procedures

## 2021-08-31 NOTE — ED NOTES
Pt was able to teach back how to chew aspirin, did well swallowing a small sip of water      Chris Pepper RN  08/31/21 8990

## 2021-08-31 NOTE — H&P
[address] [facility]  H&P- Finn Palacioil 1940, 79 y o  male MRN: [id number]  Unit/Bed#: ED 16 Encounter: [id number]  Primary Care Provider: Arsenio Smyth MD   Date and time admitted to hospital: 8/31/2021  7:09 AM    Sepsis ([address] )  Assessment & Plan  · POA  · WBCs elevated at 12 44, RR at 24-30  · Unclear etiology, CT PE did not show infiltrates concerning for PNA, 1 episode of diarrhea prior to arrival but no incidents since  Will continue to monitor  · Pulmonology consulted   · UA pending  · Procalcitonin pending    Fall  Assessment & Plan  · Patient reports having a fall this a m , witnessed by patient's wife, history provided by EMS and patient  Patient was attempting to walk after trying to hold on to refrigerator, fell backwards and hit his head, lost consciousness for 30 seconds  Denies any other recent falls, denies cause for fall  · CT head: no acute findings, microangiopathic changes  · CT c-spine: no fracture or trauma  · Neuro checks  · Fall precautions  · PT/OT evaluation     * Shortness of breath  Assessment & Plan  · Patient arrived to ED s/p  Fall, reports he has been having shortness of breath recently of unknown etiology, denies known history of CHF, COPD  No known history of stroke, PE/DVT, not on blood thinners  Patient's daughter reports he did receive COVID vaccine  · Recent family medicine outpatient notes show that there was concern for recent weight gain, suspect possible CHF  · Suspect CHF versus aspiration PNA versus tracheomalacia as etiology  · Patient sats >92% on RA, tachypneic at 24-30 breaths per minute  · Venous blood gas not concerning  · COVID negative  · Troponin x1: 0 05, will trend  · Patient denies chest pain, EKG without signs of ischemia  · D-dimer: Elevated at 0 71  · BNP slightly elevated at 673  · CT PE study: Suboptimal opacification of the distal pulmonary arteries  No gross central pulmonary embolus is seen   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  · ECHO ordered  · Pulmonology consulted, pending recommendations  · Speech evaluation pending      Diarrhea  Assessment & Plan  · Patient reports 1 episode of diarrhea this a m , non-bloody, denies nausea/vomiting  · Patient denies any other episodes, continue monitoring    Tracheomalacia  Assessment & Plan  · Unknown if patient has a history of this, concern for this contributing to SOB  · CT PE study: 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  · Pulmonology consulted     Elevated troponin  Assessment & Plan  · Troponin x1: 0 05, will trend  · Patient denies chest pain  · EKG without signs of ischemia    Benign prostatic hyperplasia with post-void dribbling  Assessment & Plan  ·  History of BPH, was previously taking terazosin, patient stopped taking  ·  Patient denies any difficulty urinating, dysuria, itching/burning, dribbling  ·  UA pending    Mixed hyperlipidemia  Assessment & Plan  · History of HLD noted in previous outpatient notes, denies taking any medications  · Lipid panel pending    Essential hypertension  Assessment & Plan  · Patient has history of HTN, not compliant with medication  · Hydralazine p r n  for systolic >538    Osteoarthritis of both hips  Assessment & Plan  ·  History of severe right-sided and moderate left-sided OA of hip, denies recent surgeries  · Uses walker at baseline  · Patient denies any pain/ trauma to hips    VTE Pharmacologic Prophylaxis: VTE Score: 3 Moderate Risk (Score 3-4) - Pharmacological DVT Prophylaxis Ordered: enoxaparin (Lovenox)  Code Status:  Full code  Discussion with family: Updated  (daughter) via phone  Anticipated Length of Stay: Patient will be admitted on an inpatient basis with an anticipated length of stay of greater than 2 midnights secondary to SOB and tachypneia of unclear etiology, meets sepsis criteria      Total Time for Visit, including Counseling / Coordination of Care: 45 minutes Greater than 50% of this total time spent on direct patient counseling and coordination of care  Chief Complaint: Fall    History of Present Illness:  Kim Lopez is a 79 y o  male with a PMH of HTN, HLD, BPH, OA B/L hips who presents after a witnessed fall  Patient and his wife currently live at [facility] independent living  Per EMS and patient, patient was attempting to walk, had to grab refrigerator, but he fell backwards and hit his head, lost consciousness for around 30 seconds, denies any open wounds or trauma to any other parts of body  Patient's daughter also reports 1 episode of nonbloody diarrhea this a m , denies nausea / vomiting  Per EMS, patient was confused, diaphoretic with labored breathing upon arrival  Patient reports that he has been short of breath for the past month or so, unsure of cause  History is difficult to obtain from patient, as he only provides one-word responses, needs redirection often, per patient's daughter this is his baseline,  unsure of any history of cognitive impairment  Patient denies any recent history of falls/ trauma, denies recent surgeries  Denies being on blood thinners, denies history of MI, stroke, DVT/PE  Per patient's daughter, patient does not follow-up with healthcare providers outpatient, states that his only medication he was prescribed, terazosin, the patient stopped taking  Review of Systems:  Review of Systems   Constitutional: Positive for diaphoresis and unexpected weight change  Negative for activity change, appetite change, chills, fatigue and fever  HENT: Negative for congestion and trouble swallowing  Hard of hearing   Eyes: Negative for photophobia and visual disturbance  Respiratory: Positive for shortness of breath  Negative for cough, choking, chest tightness, wheezing and stridor  Cardiovascular: Negative for chest pain, palpitations and leg swelling     Gastrointestinal: Positive for diarrhea  Negative for abdominal distention, abdominal pain, blood in stool, constipation, nausea and vomiting  Genitourinary: Negative for decreased urine volume, difficulty urinating, dysuria, enuresis and urgency  Musculoskeletal: Positive for gait problem  Negative for back pain, myalgias, neck pain and neck stiffness  Skin: Negative for rash and wound  Neurological: Positive for syncope, speech difficulty and headaches  Negative for dizziness, tremors, seizures, facial asymmetry, weakness, light-headedness and numbness  Short responses to questions at baseline    Psychiatric/Behavioral: Positive for confusion  Past Medical and Surgical History:   History reviewed  No pertinent past medical history  Past Surgical History:   Procedure Laterality Date    KNEE SURGERY         Meds/Allergies:  Prior to Admission medications    Medication Sig Start Date End Date Taking? Authorizing Provider   acetaminophen (TYLENOL) 325 mg tablet Take 650 mg by mouth every 6 (six) hours as needed  Patient not taking: Reported on 8/31/2021    Historical Provider, MD   calcium carbonate (TUMS) 500 mg chewable tablet Chew 2 tablets daily  Patient not taking: Reported on 8/31/2021    Historical Provider, MD   nitrofurantoin (MACROBID) 100 mg capsule Take 1 tablet twice daily with food  Patient not taking: Reported on 4/7/2021 10/17/20   Ifeanyi Lake DO   terazosin (HYTRIN) 5 mg capsule Take 1 capsule (5 mg total) by mouth daily at bedtime  Patient not taking: Reported on 4/7/2021 10/28/20   Anastasiia Lake DO     I have reviewed home medications with patient personally  I reviewed medication with patient's daughter as well      Allergies: No Known Allergies    Social History:  Marital Status: /Civil Union   Occupation: Retired  Patient Pre-hospital Living Situation: With spouse  Patient Pre-hospital Level of Mobility: walks with walker  Patient Pre-hospital Diet Restrictions: None  Substance Use History:   Social History     Substance and Sexual Activity   Alcohol Use Not Currently    Comment: Denies alcohol use for more than a decade     Social History     Tobacco Use   Smoking Status Former Smoker    Packs/day: 1 00    Years: 10 00    Pack years: 10 00   Smokeless Tobacco Never Used     Social History     Substance and Sexual Activity   Drug Use No       Family History:  Family History   Problem Relation Age of Onset    Pneumonia Father        Physical Exam:     Vitals:   Blood Pressure: (!) 198/82 (08/31/21 1500)  Pulse: 76 (08/31/21 1500)  Temperature: 98 °F (36 7 °C) (08/31/21 0725)  Respirations: (!) 31 (08/31/21 1500)  Weight - Scale: 128 kg (283 lb) (08/31/21 0725)  SpO2: 95 % (08/31/21 1500)    Physical Exam  Constitutional:       General: He is not in acute distress  Appearance: He is obese  He is not toxic-appearing or diaphoretic  HENT:      Head: Normocephalic and atraumatic  Mouth/Throat:      Mouth: Mucous membranes are moist       Pharynx: Oropharynx is clear  Eyes:      Extraocular Movements: Extraocular movements intact  Conjunctiva/sclera: Conjunctivae normal       Pupils: Pupils are equal, round, and reactive to light  Cardiovascular:      Rate and Rhythm: Normal rate and regular rhythm  Pulses: Normal pulses  Heart sounds: Normal heart sounds  Pulmonary:      Effort: No respiratory distress  Breath sounds: No stridor  Rhonchi present  No wheezing or rales  Comments: Tachypneic, audible gurgling/fluid in upper respiratory tracts, diffusely diminished breath sounds across lung fields  Chest:      Chest wall: No tenderness  Abdominal:      General: Bowel sounds are normal  There is no distension  Palpations: Abdomen is soft  There is no mass  Tenderness: There is no abdominal tenderness  Comments: No ascites appreciated   Musculoskeletal:         General: No swelling, tenderness, deformity or signs of injury        Cervical back: Neck supple  No tenderness  Skin:     General: Skin is warm and dry  Coloration: Skin is not pale  Findings: No bruising, erythema or rash  Comments: Abrasion on left elbow   Neurological:      General: No focal deficit present  Mental Status: He is oriented to person, place, and time  Sensory: No sensory deficit  Motor: No weakness  Psychiatric:         Mood and Affect: Mood normal          Behavior: Behavior normal       Comments: Patient responds with short, 1 or 2 answer responses, at baseline          Additional Data:     Lab Results:  Results from last 7 days   Lab Units 08/31/21  0735   WBC Thousand/uL 12 44*   HEMOGLOBIN g/dL 14 9   HEMATOCRIT % 45 0   PLATELETS Thousands/uL 264   NEUTROS PCT % 86*   LYMPHS PCT % 6*   MONOS PCT % 8   EOS PCT % 0     Results from last 7 days   Lab Units 08/31/21  0735   SODIUM mmol/L 139   POTASSIUM mmol/L 4 4   CHLORIDE mmol/L 107   CO2 mmol/L 24   BUN mg/dL 19   CREATININE mg/dL 1 06   ANION GAP mmol/L 8   CALCIUM mg/dL 8 6   ALBUMIN g/dL 3 4*   TOTAL BILIRUBIN mg/dL 0 86   ALK PHOS U/L 144*   ALT U/L 18   AST U/L 27   GLUCOSE RANDOM mg/dL 116                       Imaging: Reviewed radiology reports from this admission including: chest xray, CT head and CT spine  CTA ED chest PE Study   Final Result by Anmol Zavaleta MD (08/31 1113)         1  Suboptimal opacification of the distal pulmonary arteries  No gross central pulmonary embolus is seen  2   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  Workstation performed: [id number]         CT head without contrast   Final Result by Jeremy Agrawal DO (08/31 4417)      No acute intracranial abnormality  Microangiopathic changes  Workstation performed: [id number]         CT spine cervical without contrast   Final Result by Jeremy Agrawal DO (08/31 9448)      No cervical spine fracture or traumatic malalignment  Workstation performed: [id number]         XR chest 1 view portable   Final Result by Dennie Ewings, MD (08/31 1454)      No focal consolidation, pleural effusion, or pneumothorax  Workstation performed: [id number]             EKG and Other Studies Reviewed on Admission:   · EKG: NSR  HR 85     ** Please Note: This note has been constructed using a voice recognition system   **

## 2021-08-31 NOTE — ASSESSMENT & PLAN NOTE
· POA  · WBCs elevated at 12 44, RR at 24-30  · Unclear etiology, CT PE did not show infiltrates concerning for PNA, 1 episode of diarrhea prior to arrival but no incidents since   Will continue to monitor  · Pulmonology consulted   · UA pending  · Procalcitonin pending

## 2021-08-31 NOTE — ASSESSMENT & PLAN NOTE
· History of HLD noted in previous outpatient notes, denies taking any medications  · Lipid panel pending

## 2021-08-31 NOTE — RESPIRATORY THERAPY NOTE
RT Protocol Note  Jorge Graham 79 y o  male MRN: [id number]  Unit/Bed#: ED 16 Encounter: [id number]    Assessment    Principal Problem:    Shortness of breath  Active Problems:    Osteoarthritis of both hips    Essential hypertension    Mixed hyperlipidemia    Benign prostatic hyperplasia with post-void dribbling    Fall    Elevated troponin    Tracheomalacia    Diarrhea    Sepsis ([address] )      Home Pulmonary Medications:  Albuterol         History reviewed  No pertinent past medical history  Social History     Socioeconomic History    Marital status: /Civil Union     Spouse name: None    Number of children: None    Years of education: None    Highest education level: None   Occupational History    None   Tobacco Use    Smoking status: Former Smoker     Packs/day: 1 00     Years: 10 00     Pack years: 10 00    Smokeless tobacco: Never Used   Vaping Use    Vaping Use: Never used   Substance and Sexual Activity    Alcohol use: Not Currently     Comment: Denies alcohol use for more than a decade    Drug use: No    Sexual activity: Not Currently   Other Topics Concern    None   Social History Narrative     per [employer]     Social Determinants of Health     Financial Resource Strain:     Difficulty of Paying Living Expenses:    Food Insecurity:     Worried About Running Out of Food in the Last Year:     Ran Out of Food in the Last Year:    Transportation Needs:     Lack of Transportation (Medical):      Lack of Transportation (Non-Medical):    Physical Activity:     Days of Exercise per Week:     Minutes of Exercise per Session:    Stress:     Feeling of Stress :    Social Connections:     Frequency of Communication with Friends and Family:     Frequency of Social Gatherings with Friends and Family:     Attends Mandaen Services:     Active Member of Clubs or Organizations:     Attends Club or Organization Meetings:     Marital Status:    Intimate Partner Violence:     Fear of Current or Ex-Partner:     Emotionally Abused:     Physically Abused:     Sexually Abused:        Subjective         Objective    Physical Exam:   Assessment Type: Assess only  General Appearance: Alert, Awake  Respiratory Pattern: Tachypneic  Chest Assessment: Chest expansion symmetrical  Bilateral Breath Sounds: Coarse    Vitals:  Blood pressure (!) 178/96, pulse 78, temperature 98 °F (36 7 °C), resp  rate (!) 31, weight 128 kg (283 lb), SpO2 (P) 97 %  Imaging and other studies: I have personally reviewed pertinent reports  Plan    Respiratory Plan: Moderate/Severe Distress pathway        Resp Comments: (P) pt was placed on BiPAP for increase work of breathing   Pt was given a breathing tx for wheezing

## 2021-08-31 NOTE — ED NOTES
RT at bedside to place pt on Bipap   Admitting team also remains at [address] Dean Couch, RN  08/31/21 8158

## 2021-09-01 PROBLEM — J44.9 COPD (CHRONIC OBSTRUCTIVE PULMONARY DISEASE) (HCC): Status: ACTIVE | Noted: 2021-01-01

## 2021-09-01 PROBLEM — J44.1 COPD WITH ACUTE EXACERBATION (HCC): Status: ACTIVE | Noted: 2021-01-01

## 2021-09-01 PROBLEM — J44.1 COPD WITH ACUTE EXACERBATION (HCC): Status: RESOLVED | Noted: 2021-01-01 | Resolved: 2021-01-01

## 2021-09-01 PROBLEM — R53.1 LEFT-SIDED WEAKNESS: Status: ACTIVE | Noted: 2021-01-01

## 2021-09-01 PROBLEM — J44.9 COPD (CHRONIC OBSTRUCTIVE PULMONARY DISEASE) (HCC): Status: RESOLVED | Noted: 2021-01-01 | Resolved: 2021-01-01

## 2021-09-01 NOTE — RESPIRATORY THERAPY NOTE
RT Ventilator Management Note  Maria Guadalupe Warner 79 y o  male MRN: [id number]  Unit/Bed#: [facility] 529-01 Encounter: [id number]      Daily Screen    No data found in the last 10 encounters             Physical Exam:   O2 Device: (P) nc      Resp Comments: (P) as per Dr Savage Reed he said it would fine to transition pt to nc from the bipap, which I did and pt seemed to be comfortable and lane well

## 2021-09-01 NOTE — ACP (ADVANCE CARE PLANNING)
Jogre Graham  1940  [id number]      Update:    I personally spoke with Citlali López, daughter and updated that her father is here on the ICU and currently on nicardipine gtt  Noted that patient smoked 25 years ago and was not diagnosed with COPD prior to admission  Updated here pulmonology believes he is in a COPD exacerbation which is the suspected cause of his SOB on admission  Updated about treatment plan for COPD  Answered questions as best as I could about neurological prognositcation but noted it is early and he is scheduled for a follow up with CT head to monitor stroke  She notes she is on her way for visiting hours       Rachel Winchester DO

## 2021-09-01 NOTE — SPEECH THERAPY NOTE
Dysphagia consult received  Patient currently on continuous bipap and is unable to be assessed at this time  Will f/u and attempt evaluation later as warranted and appropriate

## 2021-09-01 NOTE — CONSULTS
Consultation - Stroke   Johnnie Luther 79 y o  male MRN: [id number]  Unit/Bed#: ICU 04 Encounter: [id number]      Assessment/Plan   Left-sided weakness  Assessment & Plan  Johnnie Luther is a 79 y o  male with HTN, HLD, tobacco abuse, COPD, BPH who presented to [facility] on 08/31/2021 following a fall at home  Stroke alert called on 09/01/2021 for new onset left upper and lower extremity weakness  NIHSS 12  Left upper and lower extremity weakness, facial weakness, and severely dysarthric with a right gaze preference  CT head unremarkable for acute intracranial abnormalities  CTA head neck reveal significant stenosis of Right M1, M2, and distal M3, and right A2  Radiologist notes Right M2 occlusion  Patient felt not to be an IR candidate per neurosurgery     TPA Decision: TPA given this admission  After a discussion of risks and benefits reviewing inclusion and exclusion criteria the decision was made to proceed with thrombolytic therapy  Verbal consent was obtained from  as no surrogate decision maker was available, I conferred with Neurologist Dr Yohan Deras and we are in agreement to administer the medication      Plan:  - Admit stroke pathway  - Repeat CTH 24h post tPA  - MRI brain pending    - Echo  - Pending: Hemoglobin A1c, Lipid panel   - Hold aspirin, anticoagulation, and DVT ppx 24h post tPA  - Recommend starting Atorvastatin 40 mg daily if patient passes swallow eval  - Strict BP control <180/105  - Telemetry  - PT/OT/Speech  - Frequent neuro checks  - STAT CTH for acute change  - Continue to monitor notify with changes    Imaging/Labs:  - CT head 08/31/2021: No acute intracranial abnormalities   - CT C-spine 08/31/2021: No fracture or traumatic malalignment   - Bilateral lower extremity duplex study:  · No evidence of acute or chronic DVT or superficial thrombophlebitis in BLE  - CT head 09/01:  · No acute intracranial abnormalities  · Chronic lacunar infarcts in left lentiform nucleus and centrum semiovale    - CTA head and neck 09/01:  · Radiology noting Right M2 occlusion; per discussion and review with attending neurologist, not entirely convincing for an acute occlusion   · Intracranial atherosclerosis with stenosis along Right A2, Right M1, and distal Right M3    Essential hypertension  Assessment & Plan  - Strict BP control, <180/105   - Management per ICU team     Recommendations for outpatient neurological follow up have yet to be determined  History of Present Illness     Reason for Consult / Principal Problem: Stroke alert, left sided weakness   Hx and PE limited by: Patient severely dysarthric   Patient last known well: 1000 on 09/01/2021  Stroke alert called: 1151 on 09/01/2021  Neurology time of arrival: 1154  HPI: Sameer Beauchamp is a 79 y o   male with HTN, HLD, tobacco abuse, COPD, BPH who presented to [facility] on 08/31/2021 following a fall at home  Per discussion with pulmonology and chart review  Patient was ambulating at home, went to grab the refrigerator, fell backwards, and hit his head  Patient reportedly lost consciousness for approximately 30 seconds  Upon EMS arrival, patient was found to be confused, diaphoretic, and with labored breathing  Pulmonary attending reports patient does not talk much, only responding with one worded answers  Family reports patient has been experiencing SOB x a few months  Patient was admitted for COPD exacerbation     Last know well at 10 AM on 09/01/2021 per RN who reports patient was generally weak in both arms at that time, however was able to elevated LUE off the bed  Stroke alert called on 1151 on 09/01/2021  CT head and CTA head and neck unremarkable for acute intracranial abnormalities or large vessel occlusion  CTA head and neck did reveal significant stenosis of Right M2 and A2, as well as a small left vertebral artery  Radiologist notes Right M2 occlusion   Per discussion with attending neurologist, lower suspicion for acute occlusion  Neurologist did discuss case with neurosurgery Dr Anastacio Leal who felt patient was not an IR thrombectomy candidate  Patient denies any use of blood thinners  Denies recent bleeding in abdomen or history of intracranial bleeding  He resides in [facility] independent living with his wife and ambulates with the use of a walker  Inpatient consult to Neurology  Consult performed by: Brigette Milligan PA-C  Consult ordered by: Matthias Zhu PA-C        Review of Systems  12 point ROS limited to lethargy and acuity of condition   Historical Information   History reviewed  No pertinent past medical history  Past Surgical History:   Procedure Laterality Date    KNEE SURGERY       Social History   Social History     Substance and Sexual Activity   Alcohol Use Not Currently    Comment: Denies alcohol use for more than a decade     Social History     Substance and Sexual Activity   Drug Use No     E-Cigarette/Vaping    E-Cigarette Use Never User      E-Cigarette/Vaping Substances    Nicotine No     THC No     CBD No     Flavoring No     Other No     Unknown No      Social History     Tobacco Use   Smoking Status Former Smoker    Packs/day: 1 00    Years: 10 00    Pack years: 10 00   Smokeless Tobacco Never Used     Family History:   Family History   Problem Relation Age of Onset    Pneumonia Father        Review of previous medical records was completed      Meds/Allergies   all current active meds have been reviewed, current meds:   Current Facility-Administered Medications   Medication Dose Route Frequency    albuterol inhalation solution 2 5 mg  2 5 mg Nebulization Q4H PRN    hydrALAZINE (APRESOLINE) injection 10 mg  10 mg Intravenous Q6H PRN    ipratropium (ATROVENT) 0 02 % inhalation solution 0 5 mg  0 5 mg Nebulization TID    Labetalol HCl (NORMODYNE) injection 10 mg  10 mg Intravenous Once    Labetalol HCl (NORMODYNE) injection 10 mg  10 mg Intravenous Q4H PRN    levalbuterol (XOPENEX) inhalation solution 1 25 mg  1 25 mg Nebulization TID    methylPREDNISolone sodium succinate (Solu-MEDROL) injection 40 mg  40 mg Intravenous Q12H [facility] & long-term    niCARdipine (CARDENE) 2 5 mg/mL injection **ADS Override Pull**        niCARdipine (CARDENE) 25 mg (STANDARD CONCENTRATION) in sodium chloride 0 9% 250 mL  1-15 mg/hr Intravenous Titrated    sodium chloride 0 9 % bolus 50 mL  50 mL Intravenous Once   , PTA meds:   Prior to Admission Medications   Prescriptions Last Dose Informant Patient Reported? Taking?   acetaminophen (TYLENOL) 325 mg tablet Not Taking at Unknown time Child Yes No   Sig: Take 650 mg by mouth every 6 (six) hours as needed   Patient not taking: Reported on 8/31/2021   calcium carbonate (TUMS) 500 mg chewable tablet Not Taking at Unknown time  Yes No   Sig: Chew 2 tablets daily   Patient not taking: Reported on 8/31/2021   nitrofurantoin (MACROBID) 100 mg capsule Not Taking at Unknown time Child No No   Sig: Take 1 tablet twice daily with food   Patient not taking: Reported on 4/7/2021   terazosin (HYTRIN) 5 mg capsule Not Taking at Unknown time  No No   Sig: Take 1 capsule (5 mg total) by mouth daily at bedtime   Patient not taking: Reported on 4/7/2021      Facility-Administered Medications: None    and     No Known Allergies    Objective   Vitals:Blood pressure 142/68, pulse 88, temperature 98 6 °F (37 °C), temperature source Oral, resp  rate (!) 28, height 5' 10" (1 778 m), weight 124 kg (272 lb 7 8 oz), SpO2 94 %  ,Body mass index is 39 1 kg/m²  Intake/Output Summary (Last 24 hours) at 9/1/2021 1601  Last data filed at 9/1/2021 1413  Gross per 24 hour   Intake 24 17 ml   Output 250 ml   Net -225 83 ml       Invasive Devices: Invasive Devices     Peripheral Intravenous Line            Peripheral IV 08/31/21 Right Antecubital 1 day    Peripheral IV 08/31/21 Right Wrist 1 day          Drain            External Urinary Catheter Medium <1 day              Physical Exam  Nursing note reviewed  Constitutional:       General: He is in acute distress  Appearance: He is obese  He is ill-appearing  He is not toxic-appearing or diaphoretic  HENT:      Head: Normocephalic and atraumatic  Eyes:      General: No scleral icterus  Right eye: No discharge  Left eye: No discharge  Conjunctiva/sclera: Conjunctivae normal    Pulmonary:      Comments: Short of breath, appears to be using abdominal muscles  Musculoskeletal:      Cervical back: Normal range of motion and neck supple  Skin:     General: Skin is warm and dry  Coloration: Skin is not jaundiced or pale  Findings: No bruising, erythema, lesion or rash  Psychiatric:         Mood and Affect: Mood normal          Behavior: Behavior normal          Thought Content: Thought content normal          Judgment: Judgment normal        Neurologic Exam     Mental Status   Patient is alert upon entering room  Oriented to person and place  Able to state his age  Unable to state the month or the year  Able to name all objects on the NIHSS booklet  Severely dysarthric, at times incomprehensible  Able to follow central and appendicular commands  Cranial Nerves     CN VIII   Hearing: impaired  Right gaze preference, able to over come and look to the left towards examiner, crossing midline  No evidence of nystagmus   Conjugate gaze noted  No clear visual field deficits noted on exam  Blink to threat intact bilaterally   Left lower facial weakness noted, left cheek puffing with each breath      Motor Exam   Muscle bulk: normal  Able to elevated RUE and RLE off the bed and maintain antigravity without drift    Minimal movement noted in LUE on command   LUE drifts down immediately after held in antigravity position by examiner; patient is able to catch himself, however LUE continues to drift down and falls to the bed     Able to slightly elevated LLE off the bed; when raised in antigravity position approximately 1 foot off the bed, LLE drifts and falls to bed  Withdrawals to noxious stimuli BLE, brisk bilaterally      Sensory Exam   Difficulty assessing sensory exam due to sever dysarthria and lethargy      Gait, Coordination, and Reflexes     Tremor   Resting tremor: absent  Unable to assess coordination in LUE due to weakness  Bilateral toes downgoing      NIHSS:  1a Level of Consciousness: 1 = Not alert, but arousable with minimal stimulation   1b  LOC Questions: 1 = Answers one correctly   1c  LOC Commands: 0 = Obeys both correctly   2  Best Gaze: 1 = Partial Gaze Palsy   3  Visual: 0 = No visual field loss   4  Facial Palsy: 2=Partial paralysis (total or near total paralysis of the lower face)   5a  Motor Right Arm: 0=No drift, limb holds 90 (or 45) degrees for full 10 seconds   5b  Motor Left Arm: 2=Some effort against gravity, limb cannot get to or maintain (if cured) 90 (or 45) degrees, drifts down to bed, but has some effort against gravity   6a  Motor Right Le=No drift, limb holds 90 (or 45) degrees for full 10 seconds   6b  Motor Left Le=Some effort against gravity, limb cannot get to or maintain (if cured) 90 (or 45) degrees, drifts down to bed, but has some effort against gravity   7  Limb Ataxia:  UN = Untestable (amputation, fused joint)   8  Sensory: 0=Normal; no sensory loss   9  Best Language:  0=No aphasia, normal   10  Dysarthria: 2=Severe; patient speech is so slurred as to be unintelligible in the absence of or our of proportion to any dysphagia, or is mute/anarthric   11  Extinction and Inattention (formerly Neglect): 1=Visual, tactile, auditory, spatial or personal inattention or extinction to bilateral simultaneous stimulation in one of the sensory modalities   Total Score: 12     Time NIHSS was completed: 1230    Modified [place] Score:  Unable to determine currently, will gather additional data    Lab Results: I have personally reviewed pertinent reports    Recent Results (from the past 24 hour(s))   ECG 12 lead    Collection Time: 08/31/21  4:11 PM   Result Value Ref Range    Ventricular Rate 78 BPM    Atrial Rate 78 BPM    MD Interval  ms    QRSD Interval 124 ms    QT Interval 370 ms    QTC Interval 421 ms    P Axis -80 degrees    QRS Axis -6 degrees    T Wave Axis 26 degrees   Troponin I    Collection Time: 08/31/21  4:55 PM   Result Value Ref Range    Troponin I 0 12 (H) <=0 04 ng/mL   Troponin I    Collection Time: 08/31/21  7:04 PM   Result Value Ref Range    Troponin I 0 09 (H) <=0 04 ng/mL   CBC    Collection Time: 09/01/21  6:27 AM   Result Value Ref Range    WBC 10 83 (H) 4 31 - 10 16 Thousand/uL    RBC 4 94 3 88 - 5 62 Million/uL    Hemoglobin 14 7 12 0 - 17 0 g/dL    Hematocrit 45 1 36 5 - 49 3 %    MCV 91 82 - 98 fL    MCH 29 8 26 8 - 34 3 pg    MCHC 32 6 31 4 - 37 4 g/dL    RDW 14 6 11 6 - 15 1 %    Platelets 110 411 - 495 Thousands/uL    MPV 10 4 8 9 - 12 7 fL   Basic metabolic panel    Collection Time: 09/01/21  6:27 AM   Result Value Ref Range    Sodium 141 136 - 145 mmol/L    Potassium 3 6 3 5 - 5 3 mmol/L    Chloride 110 (H) 100 - 108 mmol/L    CO2 24 21 - 32 mmol/L    ANION GAP 7 4 - 13 mmol/L    BUN 23 5 - 25 mg/dL    Creatinine 0 98 0 60 - 1 30 mg/dL    Glucose 142 (H) 65 - 140 mg/dL    Calcium 8 7 8 3 - 10 1 mg/dL    eGFR 73 ml/min/1 73sq m   Blood gas, venous    Collection Time: 09/01/21  6:27 AM   Result Value Ref Range    pH, Denny 7 406 (H) 7 300 - 7 400    pCO2, Denny 34 2 (L) 42 0 - 50 0 mm Hg    pO2, Denny 73 4 (H) 35 0 - 45 0 mm Hg    HCO3, Denny 21 0 (L) 24 - 30 mmol/L    Base Excess, Denny -2 8 mmol/L    O2 Content, Denny 20 3 ml/dL    O2 HGB, VENOUS 94 4 (H) 60 0 - 80 0 %   Procalcitonin Reflex    Collection Time: 09/01/21 10:19 AM   Result Value Ref Range    Procalcitonin 0 08 <=0 25 ng/ml   Basic metabolic panel    Collection Time: 09/01/21 12:00 PM   Result Value Ref Range    Sodium 140 136 - 145 mmol/L    Potassium 3 8 3 5 - 5 3 mmol/L    Chloride 111 (H) 100 - 108 mmol/L    CO2 22 21 - 32 mmol/L    ANION GAP 7 4 - 13 mmol/L    BUN 27 (H) 5 - 25 mg/dL    Creatinine 1 07 0 60 - 1 30 mg/dL    Glucose 124 65 - 140 mg/dL    Calcium 8 9 8 3 - 10 1 mg/dL    eGFR 66 ml/min/1 73sq m   Troponin I    Collection Time: 09/01/21 12:00 PM   Result Value Ref Range    Troponin I 0 05 (H) <=0 04 ng/mL   POCT Blood Gas (CG8+)    Collection Time: 09/01/21 12:02 PM   Result Value Ref Range    ph, Denny ISTAT 7 453 (HH) 7 300 - 7 400    pCO2, Denny i-STAT 37 7 (L) 42 0 - 50 0 mm HG    pO2, Denny i-STAT 49 0 (H) 35 0 - 45 0 mm HG    BE, i-STAT 2 -2 - 3 mmol/L    HCO3, Denny i-STAT 26 4 24 0 - 30 0 mmol/L    CO2, i-STAT 28 21 - 32 mmol/L    O2 Sat, i-STAT 86 (H) 60 - 85 %    SODIUM, I-STAT 143 136 - 145 mmol/l    Potassium, i-STAT 3 9 3 5 - 5 3 mmol/L    Hct, i-STAT 42 36 5 - 49 3 %    Hgb, i-STAT 14 3 12 0 - 17 0 g/dl    Glucose, i-STAT 131 65 - 140 mg/dl    POC FIO2 29 L    Specimen Type VENOUS    APTT    Collection Time: 09/01/21 12:50 PM   Result Value Ref Range    PTT 30 23 - 37 seconds   Protime-INR    Collection Time: 09/01/21 12:50 PM   Result Value Ref Range    Protime 13 5 11 6 - 14 5 seconds    INR 1 08 0 84 - 1 19   ]  Imaging Studies: I have personally reviewed pertinent reports and I have personally reviewed pertinent films in PACS  EKG, Pathology, and Other Studies: I have personally reviewed pertinent reports  VTE Prophylaxis: Reason for no pharmacologic prophylaxis Received IV TPA    Counseling / Coordination of Care  Total time spent today 47 minutes critical care time  Greater than 50% of total time was spent with the patient and/or family counseling and/or coordination of care  A description of the counseling/coordination of care:  Patient was seen and evaluated  Discussed with attending  Chart reviewed thoroughly including laboratory and imaging studies    Plan of care discussed with patient and primary team  Discussed plan to administer TPA and  transfer to ICU for closer monitoring

## 2021-09-01 NOTE — ASSESSMENT & PLAN NOTE
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

## 2021-09-01 NOTE — PLAN OF CARE
Problem: Potential for Falls  Goal: Patient will remain free of falls  Description: INTERVENTIONS:  - Educate patient/family on patient safety including physical limitations  - Instruct patient to call for assistance with activity   - Consult OT/PT to assist with strengthening/mobility   - Keep Call bell within reach  - Keep bed low and locked with side rails adjusted as appropriate  - Keep care items and personal belongings within reach  - Initiate and maintain comfort rounds  - Make Fall Risk Sign visible to staff  - Offer Toileting, in advance of need  - Initiate/Maintain bed alarm  - Obtain necessary fall risk management equipment:   - Apply yellow socks and bracelet for high fall risk patients  - Consider moving patient to room near nurses station  Outcome: Progressing     Problem: CARDIOVASCULAR - ADULT  Goal: Maintains optimal cardiac output and hemodynamic stability  Description: INTERVENTIONS:  - Monitor I/O, vital signs and rhythm  - Monitor for S/S and trends of decreased cardiac output  - Administer and titrate ordered vasoactive medications to optimize hemodynamic stability  - Assess quality of pulses, skin color and temperature  - Assess for signs of decreased coronary artery perfusion  - Instruct patient to report change in severity of symptoms  Outcome: Progressing  Goal: Absence of cardiac dysrhythmias or at baseline rhythm  Description: INTERVENTIONS:  - Continuous cardiac monitoring, vital signs, obtain 12 lead EKG if ordered  - Administer antiarrhythmic and heart rate control medications as ordered  - Monitor electrolytes and administer replacement therapy as ordered  Outcome: Progressing     Problem: RESPIRATORY - ADULT  Goal: Achieves optimal ventilation and oxygenation  Description: INTERVENTIONS:  - Assess for changes in respiratory status  - Assess for changes in mentation and behavior  - Position to facilitate oxygenation and minimize respiratory effort  - Oxygen administered by appropriate delivery if ordered  - Initiate smoking cessation education as indicated  - Encourage broncho-pulmonary hygiene including cough, deep breathe, Incentive Spirometry  - Assess the need for suctioning and aspirate as needed  - Assess and instruct to report SOB or any respiratory difficulty  - Respiratory Therapy support as indicated  Outcome: Progressing     Problem: METABOLIC, FLUID AND ELECTROLYTES - ADULT  Goal: Electrolytes maintained within normal limits  Description: INTERVENTIONS:  - Monitor labs and assess patient for signs and symptoms of electrolyte imbalances  - Administer electrolyte replacement as ordered  - Monitor response to electrolyte replacements, including repeat lab results as appropriate  - Instruct patient on fluid and nutrition as appropriate  Outcome: Progressing  Goal: Fluid balance maintained  Description: INTERVENTIONS:  - Monitor labs   - Monitor I/O and WT  - Instruct patient on fluid and nutrition as appropriate  - Assess for signs & symptoms of volume excess or deficit  Outcome: Progressing  Goal: Glucose maintained within target range  Description: INTERVENTIONS:  - Monitor Blood Glucose as ordered  - Assess for signs and symptoms of hyperglycemia and hypoglycemia  - Administer ordered medications to maintain glucose within target range  - Assess nutritional intake and initiate nutrition service referral as needed  Outcome: Progressing

## 2021-09-01 NOTE — ASSESSMENT & PLAN NOTE
· Patient reports having a fall morning of 8/31 , witnessed by patient's wife, history provided by EMS and patient  Patient was attempting to walk after trying to hold on to refrigerator, fell backwards and hit his head, lost consciousness for 30 seconds  Denies any other recent falls, denies cause for fall  · CT head: no acute findings, microangiopathic changes  · CT c-spine: no fracture or trauma  · During interview 9/1, patient struggled to verbally communicate and respond to some verbal commands  Communicated through grunts and blinks, but did not verbalize  Managed spontaneous movement, and responded to some verbal commands  · MRI?   · Fall precautions  · PT/OT evaluation pending

## 2021-09-01 NOTE — ASSESSMENT & PLAN NOTE
· Saber sheath trachea on 8/31 CT Chest pathognomonic for COPD  · Continue methylprednisolone, ipratropium, and albuterol as per pulmonology  · Currently placed on nasal canula oxygen over CPAP, continue and monitor patient  · Obtain sputum culture to r/o bronchitis over COPD exacerbation for SOB cause  · Continue telemetry x 1 day to r/o cardiac cause of SOB

## 2021-09-01 NOTE — ASSESSMENT & PLAN NOTE
·  History of BPH, was previously taking terazosin, patient stopped taking  ·  On admission patient denies any difficulty urinating, dysuria, itching/burning, dribbling  ·  UA pending

## 2021-09-01 NOTE — PROGRESS NOTES
Responded to rapid response  Medical staff with patient  No family present  Patient taken to CT Scan  Support to staff       09/01/21 1400   Clinical Encounter Type   Visited With Patient not available;Health care provider   Crisis Visit Code  (Rapid response/stroke alert)

## 2021-09-01 NOTE — ASSESSMENT & PLAN NOTE
· Patient arrived to ED s/p  Fall, reports he has been having shortness of breath recently of unknown etiology, denies known history of CHF, COPD  No known history of stroke, PE/DVT, not on blood thinners  Patient's daughter reports he did receive COVID vaccine  · Echocardiogram 8/31 revealed EF of 20%, no systolic or diastolic dysfunction  · Patient 96% sat on BIPAP, now currently on nasal canula  No longer tachycardic  · Venous blood gas not concerning as of 9/1  · COVID negative  · Troponin positive x 3   · Patient denies chest pain, EKG without signs of ischemia  · D-dimer: Elevated at 0 71  No evidence of PE on CTA of chest  · BNP slightly elevated at 673  · CT PE study: Suboptimal opacification of the distal pulmonary arteries  No gross central pulmonary embolus is seen  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

## 2021-09-01 NOTE — ASSESSMENT & PLAN NOTE
· History of HLD noted in previous outpatient notes, denies taking any medications  · Lipid panel on 8/31 revealed LDL and cholesterol elevation  · Initiate atorvastatin 20 mg by mouth once a day

## 2021-09-01 NOTE — PROGRESS NOTES
ICU Acceptance Note - Critical Care   Cory Galeano 79 y o  male MRN: [id number]  Unit/Bed#: [facility] 529-01 Encounter: [id number]      -------------------------------------------------------------------------------------------------------------  Chief Complaint:  Acute right M2 thromboembolic stroke    History of Present Illness :    HX and PE limited by: dysarthria in setting of acute stroke    Cory Galeano is a 79 y o  male with HTN, current smoker x 50+ years, HLD, obesity, BPH initially presented to [facility] with acute hypoxic respiratory failure and syncope  Admitted for COPD exacerbation, started on steroids, nebs, and BiPAP  Did have non-MI troponin elevation on admission which has since trended down  Echo done yesterday with some grade I diastolic dysfunction  Patient no acutely volume overloaded on exam or imaging  CT chest with airspace disease, but no acute consolidations     On 9/1 a stroke alert was called given acute left sided UE and LE weakness, left facial droop and dysarthria  No prior history of stroke  Stat CTA head showed M2 occlusion of right MCA along with multiple sites of intracranial atherosclerosis along right A2, M1 and M3  Patient initiated on tPA and brought to unit      On exam patient not endorsing any acute complaints  Can answer yes/no questions appropriately and follow commands  Has 1/5 strength on right side  Has lots of thick, loose oral secretions and gurgling cough  Denying SOB, CP, abd pain, n/v, loss of sensation, HA, vision changes, difficulty swallowing, choking  History obtained from chart review    -------------------------------------------------------------------------------------------------------------  Assessment and Plan:    Neuro:    Diagnosis: Acute right M2 stroke  o Suspect source ruptured plaque in setting of uncontrolled HTN, present on admission, and elevated lipid panel  o Stroke alert called on floor at 1150 given acute right-sided weakness and dysarthria  o CTA head showed occlusion of right M2, along with atherosclerotic changes in right A2, M1, M3    o Plan: tPA administered at 18  o Transferred to ICU  o Continue neuro checks per tPA protocol  o Non-contrast CT head in 24 hours s/p tPA  o Echo done yesterday showed evidence of G1 diastolic dysfunction only   Repeat Echo performed s/p tPA  o Continue telemetry  o State CT head for neuro changes  o PMR/PT/OT/ST consulted  o MRI this admission  o BP goal <180/105 per neurology  - cardene gtt  o Hold DVT chemical prophylaxis    CV:    Diagnosis: Hypertension  o Likely contributed to stroke by shearing plaque  o BP in Systolic's >100 on admission  o Goal BP <180/105  o cardene gtt   Diagnosis: HLD  o Statin once tolerating p o  medications   Diagnosis: grade I diastolic dysfunction  o Plan: maintain euvolemic status  - IVF hydration while NPO    Pulm:   Diagnosis: Acute on chronic hypoxic and hypercapnic respiratory failure  o Presumed COPD exacerbation compounded on obesity hypoventilation, likely MIGUEL, and possible bronchitis   o  50+ PPD smoking history  o 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  o Leukocytosis 12 on admission  o Presented initially with SOB, rhonchi and wheezing  o Received solu-medrol 125 mg once  o Plan: continue Solu-Medrol 40 mg IV bid  o Q6hr Xopenex and Atrovent  o Eventual outpatient PFTs given COPD  o On empiric abx with    Diagnosis: MIGUEL  o Plan: continue supplemental O2 by nasal cannula for now  o Consider high-flow if desaturating  o Likely will not tolerate BiPAP given coughing/secretions  - Initiate once more stable      GI:    Diagnosis: none  o Plan: monitor BM    :    Diagnosis: BPH  o Plan: monitor I&O  o Maintain condom cath    F/E/N:    Fluids: gentle IVF hydration with isolyte while NPO   Electrolytes: k>4, phos >3, mag >2   Nutrition: NPO  o Speech eval pending      Heme/Onc:    Diagnosis: none  o Plan: monitor CBC    Endo:    Diagnosis: T2DM  o A1c 5 8%  o Plan: SSI coverage and glucose checks q6 hr while NPO    ID:    Diagnosis: no active issues  o Plan: continue to monitor CBC  o procal negative  o Leukocytosis likely reactive and 2/2 steroid use      MSK/Skin:    Diagnosis: none  o Plan: frequent repositioning      Disposition: Transfer to Critical Care   Code Status: Level 1 - Full Code  --------------------------------------------------------------------------------------------------------------  Review of Systems   Constitutional: Negative for chills and fever  HENT: Negative for hearing loss  Eyes: Negative for visual disturbance  Respiratory: Positive for shortness of breath  Negative for cough, choking and wheezing  Cardiovascular: Negative for chest pain and palpitations  Gastrointestinal: Negative for abdominal pain, constipation, diarrhea, nausea and vomiting  Neurological: Positive for speech difficulty and weakness  Negative for dizziness, tremors, syncope, light-headedness, numbness and headaches  Psychiatric/Behavioral: Negative for confusion and dysphoric mood  A 12-point, complete review of systems was reviewed and negative except as stated above  limited mostly to "yes/no" questions given patient's dysarthria    Physical Exam  Constitutional:       General: He is not in acute distress  Appearance: He is obese  He is not toxic-appearing or diaphoretic  Comments: Occasional trembling, yawning, twitching   HENT:      Mouth/Throat:      Mouth: Mucous membranes are moist    Eyes:      Extraocular Movements: Extraocular movements intact  Pupils: Pupils are equal, round, and reactive to light  Cardiovascular:      Rate and Rhythm: Normal rate and regular rhythm  Pulmonary:      Breath sounds: No stridor  Rhonchi and rales present  No wheezing  Comments: Coarse breathe sounds and loose cough with lots of loose secretions and coughing  Sub-optimal inspiratory effort 2/2 obesity   Lung sounds decreased   Abdominal:      General: Bowel sounds are normal  There is distension  Tenderness: There is no abdominal tenderness  There is no guarding or rebound  Comments: Obese and distended  No rebound/rigidity/guarding  Musculoskeletal:      Right lower leg: No edema  Left lower leg: No edema  Skin:     General: Skin is warm and dry  Neurological:      Mental Status: He is alert and oriented to person, place, and time  Cranial Nerves: Cranial nerve deficit present  Sensory: No sensory deficit  Motor: Weakness present  Comments: Able to follow commands  Deficits on left side include 1/5 UE and LE (can feel muscle contracture to initiate movement but difficulty lifting)       --------------------------------------------------------------------------------------------------------------  Vitals:   Vitals:    09/01/21 1233 09/01/21 1245 09/01/21 1300 09/01/21 1309   BP: 167/64 (!) 172/81 170/80    Pulse: 80 85 80    Resp:  20 (!) 25    Temp:       SpO2:    96%   Weight:         Temp  Min: 97 9 °F (36 6 °C)  Max: 98 3 °F (36 8 °C)        Body mass index is 37 kg/m²      Laboratory and Diagnostics:  Results from last 7 days   Lab Units 09/01/21  1202 09/01/21 0627 08/31/21  0735   WBC Thousand/uL  --  10 83* 12 44*   HEMOGLOBIN g/dL  --  14 7 14 9   I STAT HEMOGLOBIN g/dl 14 3  --   --    HEMATOCRIT %  --  45 1 45 0   HEMATOCRIT, ISTAT % 42  --   --    PLATELETS Thousands/uL  --  273 264   NEUTROS PCT %  --   --  86*   MONOS PCT %  --   --  8     Results from last 7 days   Lab Units 09/01/21  1202 09/01/21  1200 09/01/21 0627 08/31/21  0735   SODIUM mmol/L  --  140 141 139   POTASSIUM mmol/L  --  3 8 3 6 4 4   CHLORIDE mmol/L  --  111* 110* 107   CO2 mmol/L  --  22 24 24   CO2, I-STAT mmol/L 28  --   --   --    ANION GAP mmol/L  --  7 7 8   BUN mg/dL  --  27* 23 19   CREATININE mg/dL  --  1 07 0 98 1 06   CALCIUM mg/dL  --  8 9 8 7 8 6   GLUCOSE RANDOM mg/dL  --  124 142* 116 ALT U/L  --   --   --  18   AST U/L  --   --   --  27   ALK PHOS U/L  --   --   --  144*   ALBUMIN g/dL  --   --   --  3 4*   TOTAL BILIRUBIN mg/dL  --   --   --  0 86          Results from last 7 days   Lab Units 09/01/21  1250 09/01/21  1250   INR  1 08  --    PTT seconds  --  30      Results from last 7 days   Lab Units 09/01/21  1200 08/31/21  1904 08/31/21  1655 08/31/21  0735   TROPONIN I ng/mL 0 05* 0 09* 0 12* 0 05*         ABG:    VBG:  Results from last 7 days   Lab Units 09/01/21  0627   PH JAMES  7 406*   PCO2 JAMES mm Hg 34 2*   PO2 JAMES mm Hg 73 4*   HCO3 JAMES mmol/L 21 0*   BASE EXC JAMES mmol/L -2 8     Results from last 7 days   Lab Units 09/01/21  1019 08/31/21  1555   PROCALCITONIN ng/ml 0 08 <0 05       Micro:        EKG: NSR on tele  Imaging: I have personally reviewed pertinent films in PACS    Historical Information   History reviewed  No pertinent past medical history    Past Surgical History:   Procedure Laterality Date    KNEE SURGERY       Social History   Social History     Substance and Sexual Activity   Alcohol Use Not Currently    Comment: Denies alcohol use for more than a decade     Social History     Substance and Sexual Activity   Drug Use No     Social History     Tobacco Use   Smoking Status Former Smoker    Packs/day: 1 00    Years: 10 00    Pack years: 10 00   Smokeless Tobacco Never Used     Exercise History: unknown  Family History:   Family History   Problem Relation Age of Onset    Pneumonia Father      Medications:  Current Facility-Administered Medications   Medication Dose Route Frequency    acetaminophen (TYLENOL) tablet 650 mg  650 mg Oral Q6H PRN    albuterol inhalation solution 2 5 mg  2 5 mg Nebulization Q4H PRN    alteplase (ACTIVASE) bolus 9 mg  9 mg Intravenous Once    Followed by    alteplase (ACTIVASE) infusion 81 mg  81 mg Intravenous Once    Followed by    sodium chloride 0 9 % bolus 50 mL  50 mL Intravenous Once    calcium carbonate (TUMS) chewable tablet 1,000 mg  1,000 mg Oral Daily PRN    docusate sodium (COLACE) capsule 100 mg  100 mg Oral BID    hydrALAZINE (APRESOLINE) injection 10 mg  10 mg Intravenous Q6H PRN    ipratropium (ATROVENT) 0 02 % inhalation solution 0 5 mg  0 5 mg Nebulization TID    Labetalol HCl (NORMODYNE) injection 10 mg  10 mg Intravenous Once    Labetalol HCl (NORMODYNE) injection 10 mg  10 mg Intravenous Q4H PRN    levalbuterol (XOPENEX) inhalation solution 1 25 mg  1 25 mg Nebulization TID    methylPREDNISolone sodium succinate (Solu-MEDROL) injection 40 mg  40 mg Intravenous Q12H [address]    niCARdipine (CARDENE) 2 5 mg/mL injection **ADS Override Pull**        niCARdipine (CARDENE) 25 mg (STANDARD CONCENTRATION) in sodium chloride 0 9% 250 mL  1-15 mg/hr Intravenous Titrated     Home medications:  Prior to Admission Medications   Prescriptions Last Dose Informant Patient Reported?  Taking?   acetaminophen (TYLENOL) 325 mg tablet Not Taking at Unknown time Child Yes No   Sig: Take 650 mg by mouth every 6 (six) hours as needed   Patient not taking: Reported on 8/31/2021   calcium carbonate (TUMS) 500 mg chewable tablet Not Taking at Unknown time  Yes No   Sig: Chew 2 tablets daily   Patient not taking: Reported on 8/31/2021   nitrofurantoin (MACROBID) 100 mg capsule Not Taking at Unknown time Child No No   Sig: Take 1 tablet twice daily with food   Patient not taking: Reported on 4/7/2021   terazosin (HYTRIN) 5 mg capsule Not Taking at Unknown time  No No   Sig: Take 1 capsule (5 mg total) by mouth daily at bedtime   Patient not taking: Reported on 4/7/2021      Facility-Administered Medications: None     Allergies:  No Known Allergies  ------------------------------------------------------------------------------------------------------------  Advance Directive and Living Will:      Power of :    POLST: ------------------------------------------------------------------------------------------------------------  Anticipated Length of Stay is > 2 midnights    Cassie Monsivais DO        Portions of the record may have been created with voice recognition software  Occasional wrong word or "sound a like" substitutions may have occurred due to the inherent limitations of voice recognition software    Read the chart carefully and recognize, using context, where substitutions have occurred

## 2021-09-01 NOTE — ASSESSMENT & PLAN NOTE
· WBCs elevated at 12 44 at admission, trending down  10 83 as of 9/1  · Patient not tachypnic or tachycardic on exam  · Unclear etiology, CT PE did not show infiltrates concerning for PNA, 1 episode of diarrhea prior to arrival but no incidents since  Will continue to monitor  · Pulmonology consulted   · UA pending  · Procalcatonin normal 8/31, second pending  · Blood culture x 2

## 2021-09-01 NOTE — ASSESSMENT & PLAN NOTE
· Patient has history of HTN, not compliant with medication  · Hydralazine 10 mg IV p r n  for systolic >349

## 2021-09-01 NOTE — ED RE-EVALUATION NOTE
11:52 AM    Rapid response called  I saw patient  He is breathing fine  No falls/trauma  Awake  No need for airway        Aleta Zuleta MD  09/01/21 5590

## 2021-09-01 NOTE — PROGRESS NOTES
RN made aware by Dr Shon Moss that it appeared pt  was having a stroke upon her assessment  Upon RN's previous assessment, pt  was able to move L side while RN was getting blood work at 10:20  Pt  now unable to move L side, L sided facial droop also noted when patient tried to smile  Rapid response called  Critical care at bedside and stroke alert called

## 2021-09-01 NOTE — ASSESSMENT & PLAN NOTE
· Serial toponin elevated x 3   · Patient denies chest pain  · EKG without signs of ischemia as of 8/31  · Repeat EKG

## 2021-09-01 NOTE — ASSESSMENT & PLAN NOTE
· Patient reports 1 episode of diarrhea 8/31, non-bloody, denies nausea/vomiting  · Patient denies any other episodes, continue monitoring

## 2021-09-01 NOTE — PROGRESS NOTES
Progress Note - Pulmonary   Kim Lopez 79 y o  male MRN: [id number]  Unit/Bed#: [facility] 529-01 Encounter: [id number]      Assessment:  Principle problems:     Acute on chronic hypoxic and hypercapnic respiratory failure likely 2/2 COPD  Active problems:      Acute stroke         Recent fall     COPD (HCC)     Shortness of breath   Plan:  Stroke   - Received tPA on 09/01/21 for acute stroke in [facility] region  - Transferred to ICU     Acute on chronic hypoxic and hypercapnic respiratory failure likely 2/2 COPD vs underlying heart disease   - CT notable for saber-sheath trachea, pathognomonic for COPD   - TTE notable for LVEF 55%, dopplers consistent with LV grade 1 diastolic dysfunction, mild-mod MV annular calcification with trace regurgitation, TV trace regurgitation, pulmonic valve and IVC not well visualized  - Given significant smoking history and noncompliance, suspect acute etiology potentially due to COPD exacerbation  - Leukocytosis on presentation (WBC 12 44) and negative infiltrates on CT, also raise the possibility of acute bronchitis over PNA, however WBC down trending   - Obtain sputum culture if able   - Continue methylprednisolone 40 mg q12 hours  - Continue nebs: xopenex and atrovent   - Continue on BiPAP, wean as able  - Would benefit from continued BiPAP use at home     Subjective:   Patient seen and examined at bedside  BiPAP mask is in place  Patient continues to intermittently respond to interview  However, given mumbled 1-word answers and BiPAP mask in place, it is difficult to appreciate history  Patient reports improved breathing compared to yesterday in the ED  He denies chest pain and palpitations  Patient denies pain, however noted to be shifting uncomfortably in bed  No acute events overnight  Update, seen at bedside 11:39 AM:  Patient examined together with the team at bedside  Patient responsive to questions, however noted to have marked weakness in the L arm   Patient unable to squeeze with his L hand or lift his L arm off of the bed  Patient able to lift L leg off the table, though weaker than R side  Patient has L facial droop  Per RN, patient received mediations around 10:00 AM and L hemiplegia was not present  Stroke alert was called  Neuro and ICU teams called to bedside for assessment with NIH >10  Patient taken down for stat CT of head and CTA of head and neck  CT head and CTA head and neck unremarkable for acute intracranial abnormalities or large vessel occlusion  CTA head and neck revealed significant stenosis of Right M2 and A2, as well as a small left vertebral artery  Objective:     Vitals: Blood pressure 150/90, pulse 72, temperature 97 9 °F (36 6 °C), resp  rate 18, weight 128 kg (283 lb), SpO2 97 %  on BiPAP, Body mass index is 40 61 kg/m²  Intake/Output Summary (Last 24 hours) at 9/1/2021 0705  Last data filed at 9/1/2021 5225  Gross per 24 hour   Intake 0 ml   Output 250 ml   Net -250 ml         Physical Exam  Gen: Awake, alert, oriented x 3, shifting legs in bed frequently, mild distress  HEENT: Mucous membranes moist, no oral lesions, no thrush, hard of hearing  NECK: No accessory muscle use, JVP not elevated  Cardiac: Regular, single S1, single S2, no murmurs, no rubs, no gallops  Lungs:  Moderate inspiratory effort, Scattered ronchi bilaterally   Abdomen: normoactive bowel sounds, soft nontender, nondistended, no rebound or rigidity, no guarding  Extremities: no cyanosis, no clubbing, no edema    Labs:  Laboratory and Diagnostics  Results from last 7 days   Lab Units 09/01/21  0627 08/31/21  0735   WBC Thousand/uL 10 83* 12 44*   HEMOGLOBIN g/dL 14 7 14 9   HEMATOCRIT % 45 1 45 0   PLATELETS Thousands/uL 273 264   NEUTROS PCT %  --  86*   MONOS PCT %  --  8     Results from last 7 days   Lab Units 09/01/21  0627 08/31/21  0735   SODIUM mmol/L 141 139   POTASSIUM mmol/L 3 6 4 4   CHLORIDE mmol/L 110* 107   CO2 mmol/L 24 24   ANION GAP mmol/L 7 8   BUN mg/dL 23 19   CREATININE mg/dL 0 98 1 06   CALCIUM mg/dL 8 7 8 6   GLUCOSE RANDOM mg/dL 142* 116   ALT U/L  --  18   AST U/L  --  27   ALK PHOS U/L  --  144*   ALBUMIN g/dL  --  3 4*   TOTAL BILIRUBIN mg/dL  --  0 86               Results from last 7 days   Lab Units 08/31/21  1904 08/31/21  1655 08/31/21  0735   TROPONIN I ng/mL 0 09* 0 12* 0 05*                     Results from last 7 days   Lab Units 08/31/21  0819   D-DIMER QUANTITATIVE ug/ml FEU 0 71*     Results from last 7 days   Lab Units 08/31/21  1555   PROCALCITONIN ng/ml <0 05       Venous BG   08/31/2021: 7 406/38 9   09/01/2021: 7 406/34 2     Microbiology:  COVID 08/31/2021 negative     Imaging and other studies:   I have previously reviewed the following imaging studies  CT spine cervical without contrast  Date: 08/31/21  Impression: No C spine fracture or traumatic malalignment      CT head without contrast  Date: 08/31/21  Impression: No acute intracranial abnormality   Microangiopathic changes      XR Chest 1 view portable  Date: 08/31/21  Impression: Minimal bibasilar linear atelectasis  No focal consolidation, pleural effusion, or pneumothorax      CT PE study   Date: 08/31/21   Impression: Suboptimal opacification of the distal pulmonary arteries  No gross central pulmonary embolus is seen   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    VAS Lower Limb Venous Duplex Study, Complete B/L  Date: 08/31/2021  Impression:   Right: no evidence of acute or chronic DVT or superficial thrombophlebitis, popliteal, posterior tibial and anterior tibial arterial Doppler waveforms are triphasic  Left: no evidence of acute or chronic DVT or superficial thrombophlebitis, popliteal, posterior tibial and anterior tibial arterial Doppler waveforms are triphasic/biphasic, well defined hypoechoic non-vascularized cystic-type structure in the popliteal fossa     TTE   Date: 08/31/21  Impression: LVEF 55%, dopplers consistent with LV grade 1 diastolic dysfunction, mild-mod MV annular calcification with trace regurgitation, TV trace regurgitation, pulmonic valve and IVC not well visualized    CT Brain  Date: 09/01/2021  Impression: No acute intracranial abnormality  Microangiopathic changes  Chronic lacunar infarcts left lentiform nucleus and centrum semiovale  CTA Head/Neck  Date: 09/01/2021  Impression: 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 series 3 image 166  Multiple sites of intracranial atherosclerosis with stenosis along the right A2, right M1, and distal M3 on the right      [facility]  [facility]  09/01/2021 07:28 AM

## 2021-09-01 NOTE — ASSESSMENT & PLAN NOTE
78 yo male with HTN, HLD, tobacco abuse, COPD, BPH who presented to [facility] ED on 08/31/2021 following a fall at home  Stroke alert called on 09/01/2021 for new onset left upper and lower extremity weakness  NIHSS 12  LUE/LLE,  facial weakness, and severely dysarthric with a right gaze preference  CT head unremarkable for acute intracranial abnormalities  CTA head neck reveal significant stenosis of Right M1, M2, and distal M3, and right A2  Radiologist notes Right M2 occlusion  Patient felt not to be an IR candidate per neurosurgery  IV tPa given  · Repeat CT head: stable with no acute hemorrhage  · Echocardiogram: LVEF 55%, no diagnostic regional wall motion abnormality, grade 1 diastolic dysfunction  Left atrium mildly dilated  Mild to moderate annular calcification  · MRI Brain revealed acute infarct in the  right basal ganglia and corona radiata, chronic left lacunar infarcts, and small chronic microhemorrhages are scattered within the brain, possibly secondary to chronic hypertension  Concern for possible CAA     Plan:  - recommend CRYSTAL, If negative consider Loop recorder to assess for arrhythmia  - telemetry monitoring   - permissive hypertension with SBP goal < 180 mmHg  - initiate ASA 81 mg daily  - initiate high dose statin when able, , HDL 52, triglycerides 80  - recommend euglycemia, HgA1c  5 8    - PT/OT/SL  - Frequent neuro checks, STAT CT Head for acute changes, and notify neurology    Imaging/Labs:  - 8/31/21 CT head: No acute intracranial abnormalities   - 8/31/2021CT Cervical spine: No fracture or traumatic malalignment   - 8/31/21 Bilateral lower extremity duplex study: No evidence of acute or chronic DVT or superficial thrombophlebitis in BLE  - 9/1/21 CT head: No acute intracranial abnormalities   Chronic lacunar infarcts in left lentiform nucleus and centrum semiovale    - 9/1/21 CTA head and neck: Radiology noting Right M2 occlusion; per discussion and review with attending neurologist, not entirely convincing for an acute occlusion  Intracranial atherosclerosis with stenosis along Right A2, Right M1, and distal Right M3  - 9/3/21 Acute infarct within the right basal ganglia and corona radiata  No hemorrhagic transformation  Additional chronic microangiopathic change within the brain parenchyma including old lacunar infarcts  Small chronic microhemorrhages are scattered within the brain, possibly secondary to chronic hypertension  No acute hemorrhage

## 2021-09-01 NOTE — UTILIZATION REVIEW
Initial Clinical Review    Admission: Date/Time/Statement:   Admission Orders (From admission, onward)     Ordered        08/31/21 1142  Inpatient Admission  Once                   Orders Placed This Encounter   Procedures    Inpatient Admission     Standing Status:   Standing     Number of Occurrences:   1     Order Specific Question:   Level of Care     Answer:   Med Surg [16]     Order Specific Question:   Estimated length of stay     Answer:   More than 2 Midnights     Order Specific Question:   Certification     Answer:   I certify that inpatient services are medically necessary for this patient for a duration of greater than two midnights  See H&P and MD Progress Notes for additional information about the patient's course of treatment  ED Arrival Information     Expected Arrival Acuity    - 8/31/2021 07:08 Emergent         Means of arrival Escorted by Service Admission type    Ambulance [facility] Emergency         Arrival complaint    Fall/SOB        Chief Complaint   Patient presents with   Bhavin Portillo     pt comes to ed via ems after having a witnessed fall by his wife at [facility] living  pt hit his head and lost conciousness    Shortness of Breath     upon ems arrival pt was confused diaphoretic with labored breathing  Initial Presentation:   79 y o  male with a PMH of HTN, HLD, BPH, OA B/L hips who presents after a witnessed fall  Patient and his wife currently live at [facility]  Per EMS and patient, patient was attempting to walk, had to grab refrigerator, but he fell backwards and hit his head, lost consciousness for around 30 seconds, denies any open wounds or trauma to any other parts of body  Patient's daughter also reports 1 episode of nonbloody diarrhea this a m  Per EMS, patient was confused, diaphoretic with labored breathing upon arrival  Patient reports that he has been short of breath for the past month or so, unsure of cause    In ED lungs with rhonchi, Tachypneic, audible gurgling/fluid in upper respiratory tracts, diffusely diminished breath sounds across lung fields  Abrasion on left elbow  Ph abilio 7 406 , troponin 0 05, bnp 673, chol 219, wbc 12 44, d dimer 0 71   PULMONARY CONSULT  Assessment:  Shortness of breath 2/2 COPD exacerbation vs acute bronchitis   Plan:  - CT notable for saber-sheath trachea, pathognomonic for COPD   - Given significant smoking history, suspect aspect of COPD could be tying in here   - Leukocytosis on presentation with WBC at 12 44 and negative infiltrates on CT, raise the possibility of acute bronchitis   - Continue trending WBC, obtain sputum culture if able   - Start steroids  - Start duo-nebs    - Place on BiPAP 12/5, continue through the night   - Continue on telemetry, monitor for further PVC's or evidence of arrhythmia    Date:  9/1/21  Day 2:   Rapid Response transferred to ICU for acute change in neuro status and acute  was admitted August 31st after syncopal episode  Patient was found have shortness of breath and elevated troponin was admitted to the medical service  Today, the patient was being evaluated by Pulmonary noticed him to have acute left-sided weakness and slurred speech  Rapid response and ultimately stroke alert were called  Patient was evaluated by Neurology and with his NIH score greater than 10 he was taken down for stat CT of his head and CTA of head and neck  Ultimately, the decision was made with Neurology, Critical Care, and with the assistance of Radiology that we will proceed with tPA administration and the patient will be transferred to the ICU  Patient was found to be hypertensive and was given labetalol for blood pressure control  He will have q 1 hour neuro checks  NEUROLOGY CONSULT   Stroke alert called on 09/01/2021 for new onset left upper and lower extremity weakness  NIHSS 12   Left upper and lower extremity weakness, facial weakness, and severely dysarthric with a right gaze preference  CT head unremarkable for acute intracranial abnormalities  CTA head neck reveal significant stenosis of Right M1, M2, and distal M3, and right A2  Radiologist notes Right M2 occlusion  Patient felt not to be an IR candidate per neurosurgery   TPA Decision: TPA given this admission  After a discussion of risks and benefits reviewing inclusion and exclusion criteria the decision was made to proceed with thrombolytic therapy  Admit stroke pathway  Repeat CTH post tpa 24h  MRI brain pending, echo  Pend hgba1c lipid panel Recommend starting Atorvastatin 40 mg daily if patient passes swallow eval  Strict BP control <180/105  ICU [id number] y o  male here for witnessed left sided facial droop and slurred speech while being evaluated by pulmonology for possible COPD exacerbation  Last smoked 25 years ago  Will monitor and continue nicardipine gtt with SBP goal < 180  Patient to be NPO since unable to bring up own secretions and has dysarthria  Unable to understand patient  Q1 hour neurochecks and 24 hour head CT to be evaluated  9/2/21  HPI/24hr events:  79 y  o  male with HTN, current smoker x 50+ years, HLD, obesity, BPH initially presented to [facility] with acute hypoxic respiratory failure and syncope  Admitted for COPD exacerbation, started on steroids, nebs, and BiPAP  Did have non-MI troponin elevation on admission which has since trended down  Echo done yesterday with some grade I diastolic dysfunction  Patient no acutely volume overloaded on exam or imaging  CT chest with airspace disease, but no acute consolidations  Tracheomalacia also present      On 9/1 a stroke alert was called given acute left sided UE and LE weakness, left facial droop and dysarthria  No prior history of stroke  Stat CTA head showed M2 occlusion of right MCA along with multiple sites of intracranial atherosclerosis along right A2, M1 and M3  Patient initiated on tPA and brought to unit    Deficits: Can answer yes/no questions appropriately and follow commands  Has 1/5 strength on LLE, 0/5 on LUE  Left facial droop  Has lots of thick, loose oral secretions and gurgling cough  Last 24 hours:   Goal BP maintained <180 without nicardipine gtt  Patient intubated this morning giving impending respiratory failure from fatigue  Will have 24 hour post tPA imaging today after noon  MRI PENDING:     ED Triage Vitals   Temperature Pulse Respirations Blood Pressure SpO2   08/31/21 0725 08/31/21 0725 08/31/21 0725 08/31/21 0725 08/31/21 0725   98 °F (36 7 °C) 86 (!) 26 142/69 93 %      Temp src Heart Rate Source Patient Position - Orthostatic VS BP Location FiO2 (%)   -- 08/31/21 0725 08/31/21 1700 08/31/21 1700 09/01/21 0359    Monitor Sitting Left arm 40      Pain Score       08/31/21 1334       No Pain          Wt Readings from Last 1 Encounters:   09/01/21 117 kg (257 lb 14 4 oz)     Additional Vital Signs:   09/01/21 07:12:47  97 9 °F (36 6 °C)  --  18  --  --  --  --  --  --  --  --  --   09/01/21 0649  --  --  --  --  --  97 %  40  --  --  BiPAP  Face mask  --   09/01/21 0359  --  --  --  --  --  97 %  40  --  --  BiPAP  Face mask  --   09/01/21 02:24:37  98 3 °F (36 8 °C)  --  16  162/90  114  98 %  --  --  --  --  --  --   09/01/21 02:18:59  --  --  --  162/90  114  --  --  --  --  --  --  --   08/31/21 2322  --  --  --  --  --  97 %  --  --  --  --  Face mask  --   08/31/21 22:43:21  97 9 °F (36 6 °C)  --  17  171/90Abnormal   117  98 %  --  --  --  --  --  --   08/31/21 2142  --  --  --  --  --  97 %  --  --  --  --  Face mask  --   08/31/21 21:40:14  97 9 °F (36 6 °C)  72  20  153/70  98  96 %  --  --  --  --  --  --   08/31/21 2015  --  86  32Abnormal   190/103Abnormal   140  --  --  --  --  --  --  Lying   08/31/21 1939  --  --  --  --  --  --  --  --  --  --  Face mask       Pertinent Labs/Diagnostic Test Results:   9/1 CTA STROKE ALERT 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 series 3 image 166     Multiple sites of intracranial atherosclerosis with stenosis along the right A2, right M1, and distal M3 on the right  9/1/21 CT STROKE ALERT BRAIN   8/31 CTA pe study 1   Suboptimal opacification of the distal pulmonary arteries   No gross central pulmonary embolus is seen  2   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  8/31 CT head No acute intracranial abnormality   Microangiopathic changes   Chronic lacunar infarcts left lentiform nucleus and centrum semiovale  No acute intracranial abnormality   Microangiopathic changes  8/31 CT spine cervical No focal consolidation, pleural effusion, or pneumothorax     Results from last 7 days   Lab Units 08/31/21  0735   SARS-COV-2  Negative     Results from last 7 days   Lab Units 09/01/21  0627 08/31/21  0735   WBC Thousand/uL 10 83* 12 44*   HEMOGLOBIN g/dL 14 7 14 9   HEMATOCRIT % 45 1 45 0   PLATELETS Thousands/uL 273 264   NEUTROS ABS Thousands/µL  --  10 65*     Results from last 7 days   Lab Units 09/01/21  0627 08/31/21  0735   SODIUM mmol/L 141 139   POTASSIUM mmol/L 3 6 4 4   CHLORIDE mmol/L 110* 107   CO2 mmol/L 24 24   ANION GAP mmol/L 7 8   BUN mg/dL 23 19   CREATININE mg/dL 0 98 1 06   EGFR ml/min/1 73sq m 73 66   CALCIUM mg/dL 8 7 8 6     Results from last 7 days   Lab Units 08/31/21  0735   AST U/L 27   ALT U/L 18   ALK PHOS U/L 144*   TOTAL PROTEIN g/dL 7 6   ALBUMIN g/dL 3 4*   TOTAL BILIRUBIN mg/dL 0 86     Results from last 7 days   Lab Units 09/01/21  0627 08/31/21  0735   GLUCOSE RANDOM mg/dL 142* 116     Results from last 7 days   Lab Units 08/31/21  0735   HEMOGLOBIN A1C % 5 8*   EAG mg/dl 120     Results from last 7 days   Lab Units 09/01/21  0627 08/31/21  0735   PH JAMES  7 406* 7 406*   PCO2 JAMES mm Hg 34 2* 38 9*   PO2 JAMES mm Hg 73 4* 54 9*   HCO3 JAMES mmol/L 21 0* 23 9*   BASE EXC JAMES mmol/L -2 8 -0 6   O2 CONTENT JAMES ml/dL 20 3 18 9   O2 HGB, VENOUS % 94 4* 86 2*     Results from last 7 days   Lab Units 08/31/21  1904 08/31/21  1655 08/31/21  0735 TROPONIN I ng/mL 0 09* 0 12* 0 05*     Results from last 7 days   Lab Units 08/31/21  0819   D-DIMER QUANTITATIVE ug/ml FEU 0 71*     Results from last 7 days   Lab Units 08/31/21  0735   TSH 3RD GENERATON uIU/mL 1 810     Results from last 7 days   Lab Units 08/31/21  1555   PROCALCITONIN ng/ml <0 05     Results from last 7 days   Lab Units 08/31/21  0735   NT-PRO BNP pg/mL 673*     ED Treatment:   Medication Administration from 08/31/2021 0708 to 08/31/2021 2132       Date/Time Order Dose Route Action Action by Comments     08/31/2021 0856 aspirin chewable tablet 324 mg 324 mg Oral Given Asya Maldonado RN      08/31/2021 1035 iohexol (OMNIPAQUE) 350 MG/ML injection (SINGLE-DOSE) 100 mL 100 mL Intravenous Given Brady Le      08/31/2021 1611 acetaminophen (TYLENOL) tablet 650 mg 650 mg Oral Given Asya Maldonado RN      08/31/2021 1704 docusate sodium (COLACE) capsule 100 mg 0 mg Oral Hold Tea Pardo RN      08/31/2021 1637 enoxaparin (LOVENOX) subcutaneous injection 40 mg   Subcutaneous Canceled Entry Valerie Hobbs RN      08/31/2021 1611 hydrALAZINE (APRESOLINE) injection 5 mg 5 mg Intravenous Given Asya Maldonado RN      08/31/2021 1726 enoxaparin (LOVENOX) subcutaneous injection 40 mg 40 mg Subcutaneous Given Asya Maldonado RN      08/31/2021 1701 methylPREDNISolone sodium succinate (Solu-MEDROL) injection 125 mg 125 mg Intravenous Given Andre Fontaine RN      08/31/2021 1938 levalbuterol (XOPENEX) inhalation solution 1 25 mg 1 25 mg Nebulization Given RT Priyanka      08/31/2021 1938 ipratropium (ATROVENT) 0 02 % inhalation solution 0 5 mg 0 5 mg Nebulization Given RT Priyanka      08/31/2021 1715 albuterol (2 5 mg/3 mL) 0 083 % inhalation solution **ADS Override Pull** 2 5 mg  Given RT Priyanka         History reviewed  No pertinent past medical history    Present on Admission:   Essential hypertension   Osteoarthritis of both hips   Mixed hyperlipidemia   Benign prostatic hyperplasia with post-void dribbling      Admitting Diagnosis: Shortness of breath [R06 02]  Syncope [R55]  Tracheomalacia [J39 8]  Elevated troponin [R77 8]  Other injury of unspecified body region, initial encounter [T14  8XXA]  Age/Sex: 79 y o  male  Admission Orders:  GMF>ICU  BIPAP  ECHO  PT OT   NPO  CT HEAD  MRI  Lipid panel with direct ldl  hgbA1c  tropoonin  plt ct  Scheduled Medications:  docusate sodium, 100 mg, Oral, BID  enoxaparin, 40 mg, Subcutaneous, Q12H MARYAN  ipratropium, 0 5 mg, Nebulization, TID  levalbuterol, 1 25 mg, Nebulization, TID  methylPREDNISolone sodium succinate, 40 mg, Intravenous, Q12H [address]      Continuous IV Infusions:     PRN Meds:  acetaminophen, 650 mg, Oral, Q6H PRN  albuterol, 2 5 mg, Nebulization, Q4H PRN  calcium carbonate, 1,000 mg, Oral, Daily PRN  hydrALAZINE, 10 mg, Intravenous, Q6H PRN        IP CONSULT TO PULMONOLOGY    Network Utilization Review Department  ATTENTION: Please call with any questions or concerns to [phone] and carefully listen to the prompts so that you are directed to the right person  All voicemails are confidential   Cassy Chavez all requests for admission clinical reviews, approved or denied determinations and any other requests to dedicated fax number below belonging to the campus where the patient is receiving treatment   List of dedicated fax numbers for the Facilities:  [address] DENIALS (Administrative/Medical Necessity) [phone]   [address] (Maternity/NICU/Pediatrics) [phone]   [address] [phone]   [address] [address] [place] [address] [phone] [address] [address] [place] [address] [phone]

## 2021-09-01 NOTE — QUICK NOTE
Pt from [facility] independent living and arrived with paper work from the facility regarding resuscitation if found without vitals on the premise  This paper is not a living will or POLST form  When pt arrived to the floor there was some confusion as to pt's desired code status and pt was unable to answer any questions  I called pt's daughter, Keny Carpenter, to clarify  Pt is a full code at this time, as was discussed with admitting provider in the ED

## 2021-09-01 NOTE — RAPID RESPONSE
Rapid Response Note  Jorge Graham 79 y o  male MRN: [id number]  Unit/Bed#: [facility] 529-01 Encounter: [id number]    Rapid Response Notification(s):   Response called date/time:  9/1/2021 11:50 AM  Response team arrival date/time:  9/1/2021 11:53 AM  Response end date/time:  9/1/2021 12:32 PM  Rapid response location:  [facility]  Primary reason for rapid response call:  Acute change in neuro status and acute change in RR    Rapid Response Intervention(s):   Airway:  None  Breathing:  Oxygen  Circulation:  None  Fluids administered:  None  Medications administered: labetalol and tPA  Background/Situation:   Jorge Graham is a 79 y o  male who was admitted August 31st after syncopal episode  Patient was found have shortness of breath and elevated troponin was admitted to the medical service  Today, the patient was being evaluated by Pulmonary noticed him to have acute left-sided weakness and slurred speech  Rapid response and ultimately stroke alert were called  Patient was evaluated by Neurology and with his NIH score greater than 10 he was taken down for stat CT of his head and CTA of head and neck  Ultimately, the decision was made with Neurology, Critical Care, and with the assistance of Radiology that we will proceed with tPA administration and the patient will be transferred to the ICU  Patient was found to be hypertensive and was given labetalol for blood pressure control  He will have q 1 hour neuro checks  Review of Systems   Unable to perform ROS: Mental status change     Objective:   Vitals:    09/01/21 1151 09/01/21 1152 09/01/21 1158 09/01/21 1200   BP:  160/82  160/92   Pulse: 84  87    Resp: 22      Temp:       SpO2: 94%      Weight:         Physical Exam  Vitals reviewed  Constitutional:       Appearance: He is ill-appearing  Interventions: Nasal cannula in place  Cardiovascular:      Rate and Rhythm: Normal rate and regular rhythm  Heart sounds: No murmur heard     No friction rub  Pulmonary:      Effort: Pulmonary effort is normal       Breath sounds: Rhonchi present  Abdominal:      General: Abdomen is protuberant  Neurological:      Mental Status: He is lethargic  Comments: Left sided weakness  Slurred speech  Assessment:   · Concern for acute stroke with new left sided weakness and slurred speech  LKN 1000  · HTN  · COPD exacerbation    Plan:   · Stroke alert activated  · CT head and CTA of head and neck  · Neurology consult  · Labetalol 10 mg x 1 for hypertension  · Aggressive NT suctioning  · BiPAP as needed for work of breathing and hypoxia  · After review of the CT scan, it was determined the patient would be a candidate for tPA given his last known normal at 10:00 a m  And no contraindications  TPA to be infusing the patient will be transferred to ICU level of care  · Q 1 hour neurologic checks  · CT head in 24 hours  · Statin therapy     Rapid Response Outcome:   Condition: In critical condition  Progression:  Unchanged  Transfer:  Transfer to ICU  Primary service notified of transfer: Yes    Handoff report: in person    Code Status: Level 1 (Full Code)      Family notified of transfer: yes  Family member contacted: Daughter Taina Hobbs via telephone      Portions of the record may have been created with voice recognition software  Occasional wrong word or "sound a like" substitutions may have occurred due to the inherent limitations of voice recognition software  Read the chart carefully and recognize, using context, where substitutions have occurred      Marilynn Mendoza PA-C    Critical care time 32 minutes

## 2021-09-01 NOTE — PHYSICAL THERAPY NOTE
Physical Therapy Cancellation Note    PT CONSULT RECEIVED  PATIENT RECENTLY RAPID RESPONSE AND NOT MEDICALLY APPROPRIATE FOR PARTICIPATION IN PT SERVICES AT THIS TIME  PT WILL CONTINUE TO FOLLOW ON CASELOAD AND PERFORM INITIAL EVALUATION AS MEDICALLY APPROPRIATE      GAIL LYONS PT, DPT

## 2021-09-02 PROBLEM — J96.00 ACUTE RESPIRATORY FAILURE (HCC): Status: ACTIVE | Noted: 2021-01-01

## 2021-09-02 NOTE — PROCEDURES
Endotracheal Intubation Procedure Note    Indication for endotracheal intubation: impending respiratory failure and with tracheomalacia, worsening protection of airway, increased WOB  Sedation: fentanyl and propofol  Paralytic: vecuronium  Lidocaine: no   Atropine: no   Equipment: [id number] with size 4 MAC blade laryngoscope blade and 8 0mm cuffed endotracheal tube  Cricoid Pressure: no   Number of attempts: 1  ETT location confirmed by by auscultation, by CXR and ETCO2 monitor  Performed by RAFITA Kelsey    Anesthesia and Critical Care

## 2021-09-02 NOTE — PROGRESS NOTES
Called [employer] daughter, updated increased work of breathing and patient agreeing he is feeling unwell and need for temporary breathing tube or intubation at this time before too late  AQA and daughter agrees with plan  RAFITA Gomez    Anesthesia and Critical Care

## 2021-09-02 NOTE — RESPIRATORY THERAPY NOTE
09/02/21 0700   Respiratory Assessment   Assessment Type Assess only   General Appearance Alert; Awake   Respiratory Pattern Spontaneous   Chest Assessment Chest expansion symmetrical   Bilateral Breath Sounds Coarse   R Breath Sounds Coarse   L Breath Sounds Coarse   Cough Weak   Suction Nasal   Resp Comments Pt found in respiratory distress at this time and unable to clear secretions  Pt NT suctioned and given UDN without improvement  Pt placed on NRB mask and  prepared for intubation      O2 Device NRB   Additional Assessments   Pulse 64   Respirations (!) 29   SpO2 95 %

## 2021-09-02 NOTE — PHYSICAL THERAPY NOTE
Physical Therapy Cancellation Note    PT orders received chart review completed  Pt is currently intubated/sedated and not appropriate to participate in skilled PT at this time  PT will follow and eval as medically appropriate       09/02/21 1400   PT Last Visit   PT Visit Date 09/02/21   Note Type   Cancel Reasons Intubated/sedated     Mike Poag, PT

## 2021-09-02 NOTE — CONSULTS
PHYSICAL MEDICINE AND REHABILITATION CONSULT NOTE  Nichole Jarquin 79 y o  male MRN: [id number]  Unit/Bed#: ICU 04 Encounter: [id number]    Requested by (Physician/Service): Liz Oviedo MD  Reason for Consultation:  Assessment of rehabilitation needs  Chief Complaint:  Patient intubated    Assessment:  Rehabilitation Diagnosis:    L sided weakness/stroke-like symptoms  o S/p tPA  o Originally presented with fall, LOC, SOB, Sepsis   Impaired mobility and self care   Impaired cognition     Recommendations:  Rehabilitation Plan:   Currently intubated and sedated (15 min ago)   To be determined  Will follow medical/functional progress  Medical Co-morbidities Pending Issues:  L sided weakness  - Stroke alert  MRI pending  - Not an IR candidate  - Got tPA  - Repeat echo  HTN:  - SBP > 200 on admission   - Goal SBP <180/105    HLD    Acute on chronic hypoxic and hypercapnic respiratory failure  - COPD exacerbation in setting of likely OHV, MIGUEL, and possible bronchitis  - long term tobacco use  - tracheomalacia as well  - On steroids, scheduled nebs  - Intubated today    BPH:  - Has condom catheter  T2DM    Leukocytosis  - trending up    #Vitals: /70  Now intubated  #Labs: Leukocytosis  #Pain: Prn fentanyl  Sedated  #Bowel: No BM yet, monitoring  #Bladder: Condom catheter, monitoring I/O  #Skin/Pressure Injury Prevention: Turn Q2hr in bed, with weight shifts [id number] in wheelchair  #DVT Prophylaxis: tPA yesterday  SCDs  #GI Prophylaxis: None at this time  Patient NPO  Thank you for allowing the PM&R service to participate in the care of this patient  We will continue to follow Jazmyn Severino's progress with you  Please do not hesitate to call with questions or concerns    History of Present Illness:  Nichole Jarquin is a 79 y o  male with PMH of HTN, HLD, BPH, OA of both hips, lifelong tobacco use who presented to [facility] on 08/31/2021 after a witnessed fall   Fell backward and hit his head, LOC for about 30s  Noted to be SOB in ER and meeting sepsis criteria  CTH without acute intracranial abnormality  CT C-Spine without spinal fracture or traumatic malalignment, CXR showed no focal consolidation, pleural effusion, or PTX  CTA PE was suboptimal to evaluate distal pulmonary arteries, but no gross central PE was seen  Also noted to have tracheomalacia  Per family, has had increased wob/sob over past several weeks  Pulm was consulted, felt likely COPD, OHV/MIGUEL - Started him on BiPAP, empiric abx, steroids, and scheduled nebs  Unfortunately on 9/1 with L sided weakness and stroke alert was called, NIHSS 12, CTH showed no hemorrhage and a chronic BG stroke bilaterally  CTA H/N showed no significant carotid disease, but multifocal intracranial stenoses  He was given tPA and transferred to the ICU  This morning, found in respiratory distress  He is being intubated  Unable to get history from patient, family not available  Discussed with nursing - they stated he was AAOx3, but had aphasia, L sided significant weakness  Review of Systems: Unable to get ROS from patient  CURRENT GAP IN FUNCTION     Prior to Admission:     Functional Status: TO be clarified  FUNCTIONAL STATUS:  Physical Therapy: pending    Occupational Therapy: pending    Speech Therapy: pending     Social History: To be clarified      Social History     Socioeconomic History    Marital status: /Civil Union     Spouse name: None    Number of children: None    Years of education: None    Highest education level: None   Occupational History    None   Tobacco Use    Smoking status: Former Smoker     Packs/day: 1 00     Years: 10 00     Pack years: 10 00    Smokeless tobacco: Never Used   Vaping Use    Vaping Use: Never used   Substance and Sexual Activity    Alcohol use: Not Currently     Comment: Denies alcohol use for more than a decade    Drug use: No    Sexual activity: Not Currently   Other Topics Concern    None   Social History Narrative     per [employer]     Social Determinants of Health     Financial Resource Strain:     Difficulty of Paying Living Expenses:    Food Insecurity:     Worried About Running Out of Food in the Last Year:     [address] in the Last Year:    Transportation Needs:     Lack of Transportation (Medical):      Lack of Transportation (Non-Medical):    Physical Activity:     Days of Exercise per Week:     Minutes of Exercise per Session:    Stress:     Feeling of Stress :    Social Connections:     Frequency of Communication with Friends and Family:     Frequency of Social Gatherings with Friends and Family:     Attends [employer] Services:     Active Member of Clubs or Organizations:     Attends Club or Organization Meetings:     Marital Status:    Intimate Partner Violence:     Fear of Current or Ex-Partner:     Emotionally Abused:     Physically Abused:     Sexually Abused:         Family History:    Family History   Problem Relation Age of Onset    Pneumonia Father          MEDICATIONS:     Current Facility-Administered Medications:     albuterol inhalation solution 2 5 mg, 2 5 mg, Nebulization, Q4H PRN, Cat Robles PA-C, 2 5 mg at 09/02/21 0318    fentaNYL (SUBLIMAZE) injection 50 mcg, 50 mcg, Intravenous, Q5 Min PRN, Loraine Roque DO    hydrALAZINE (APRESOLINE) injection 10 mg, 10 mg, Intravenous, Q6H PRN, LIYA Rogers    insulin lispro (HumaLOG) 100 units/mL subcutaneous injection 2-12 Units, 2-12 Units, Subcutaneous, Q6H [address] **AND** Fingerstick Glucose (POCT), , , Q6H, Cassie Monsivais DO    insulin lispro (HumaLOG) 100 units/mL subcutaneous injection 2-12 Units, 2-12 Units, Subcutaneous, 0200, Cassie Monsivais DO    ipratropium (ATROVENT) 0 02 % inhalation solution 0 5 mg, 0 5 mg, Nebulization, TID, Cat Robles PA-C, 0 5 mg at 09/02/21 0745    Labetalol HCl (NORMODYNE) injection 10 mg, 10 mg, Intravenous, Once, Cat Robles PA-C    Labetalol HCl (NORMODYNE) injection 10 mg, 10 mg, Intravenous, Q4H PRN, LIYA Maharaj, 10 mg at 09/02/21 0413    levalbuterol [facility]) inhalation solution 1 25 mg, 1 25 mg, Nebulization, TID, Cat Robles PA-C, 1 25 mg at 09/02/21 0745    methylPREDNISolone sodium succinate (Solu-MEDROL) injection 40 mg, 40 mg, Intravenous, Q12H [address], Cat Robles PA-C, 40 mg at 09/01/21 2121    multi-electrolyte (PLASMALYTE-A/ISOLYTE-S PH 7 4) IV solution, 100 mL/hr, Intravenous, Continuous, Cassie Monsivais DO, Last Rate: 100 mL/hr at 09/01/21 1702, 100 mL/hr at 09/01/21 1702    niCARdipine (CARDENE) 25 mg (STANDARD CONCENTRATION) in sodium chloride 0 9% 250 mL, 1-15 mg/hr, Intravenous, Titrated, Fadi President, MD, Stopped at 09/01/21 2212    norepinephrine (LEVOPHED) 1 mg/mL injection **ADS Override Pull**, , , ,     propofol (DIPRIVAN) 1000 mg in 100 mL infusion (premix), 5-50 mcg/kg/min, Intravenous, Titrated, Jesus Roque DO    [COMPLETED] alteplase (ACTIVASE) bolus 9 mg, 9 mg, Intravenous, Once, 9 mg at 09/01/21 1237 **FOLLOWED BY** [COMPLETED] alteplase (ACTIVASE) infusion 81 mg, 81 mg, Intravenous, Once, Stopped at 09/01/21 2354 **FOLLOWED BY** sodium chloride 0 9 % bolus 50 mL, 50 mL, Intravenous, Once, Hernando Clay PA-C    Past Medical History:     History reviewed  No pertinent past medical history  Past Surgical History:     Past Surgical History:   Procedure Laterality Date    KNEE SURGERY           Allergies:     No Known Allergies        Physical Exam:  BP (!) 171/70   Pulse 64   Temp 98 9 °F (37 2 °C) (Oral)   Resp (!) 29   Ht 5' 10" (1 778 m) Comment: per 6/4/21 encounter  Wt 124 kg (272 lb 7 8 oz)   SpO2 95%   BMI 39 10 kg/m²        Intake/Output Summary (Last 24 hours) at 9/2/2021 6945  Last data filed at 9/2/2021 0600  Gross per 24 hour   Intake 1602 93 ml   Output 980 ml   Net 622 93 ml       Body mass index is 39 1 kg/m²      Gen: No acute distress  Sedated  HEENT: OG tube  Heme/Extr: No edema  Pulmonary: Intubated, vented  : No gloria, condom catheter in place  GI: Soft, non-tender, non-distended  Integumentary: Skin is warm, dry  Neuro: Intubated and sedated on propofol  LABORATORY RESULTS:      Lab Results   Component Value Date    HGB 14 5 09/02/2021    HCT 44 1 09/02/2021    WBC 15 92 (H) 09/02/2021     Lab Results   Component Value Date    BUN 37 (H) 09/02/2021    BUN 14 12/01/2015     12/01/2015    K 3 9 09/02/2021    K 4 2 12/01/2015     (H) 09/02/2021     12/01/2015    GLUCOSE 131 09/01/2021    GLUCOSE 90 12/01/2015    CREATININE 0 98 09/02/2021    CREATININE 0 92 12/01/2015     Lab Results   Component Value Date    PROTIME 13 5 09/01/2021    INR 1 08 09/01/2021        DIAGNOSTIC STUDIES: Reviewed  XR chest 1 view portable    Result Date: 8/31/2021  Impression: No focal consolidation, pleural effusion, or pneumothorax  Workstation performed: [id number]     CT head without contrast    Result Date: 8/31/2021  Impression: No acute intracranial abnormality  Microangiopathic changes  Workstation performed: [id number]     CT spine cervical without contrast    Result Date: 8/31/2021  Impression: No cervical spine fracture or traumatic malalignment  Workstation performed: [id number]     CT stroke alert brain    Result Date: 9/1/2021  Impression: No acute intracranial abnormality  Microangiopathic changes  Chronic lacunar infarcts left lentiform nucleus and centrum semiovale  I personally discussed this study with Dr Eloy Aranda on 9/1/2021 at 12:22 PM  Workstation performed: [id number]     CTA ED chest PE Study    Result Date: 8/31/2021  Impression: 1  Suboptimal opacification of the distal pulmonary arteries  No gross central pulmonary embolus is seen  2   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   Workstation performed: [id number]     CTA stroke alert (head/neck)    Result Date: 9/1/2021  Impression: 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 series 3 image 166  Multiple sites of intracranial atherosclerosis with stenosis along the right A2, right M1, and distal M3 on the right   I personally discussed this study with Dr Nimisha Cain on 9/1/2021 at 12:31 PM  Workstation performed: [id number]

## 2021-09-02 NOTE — PROGRESS NOTES
Daily Progress Note - Critical Care   Yen Galvan 79 y o  male MRN: [id number]  Unit/Bed#: ICU 04 Encounter: [id number]        ----------------------------------------------------------------------------------------  HPI/24hr events:  79 y o  male with HTN, current smoker x 50+ years, HLD, obesity, BPH initially presented to [facility] with acute hypoxic respiratory failure and syncope  Admitted for COPD exacerbation, started on steroids, nebs, and BiPAP  Did have non-MI troponin elevation on admission which has since trended down  Echo done yesterday with some grade I diastolic dysfunction  Patient no acutely volume overloaded on exam or imaging  CT chest with airspace disease, but no acute consolidations  Tracheomalacia also present      On 9/1 a stroke alert was called given acute left sided UE and LE weakness, left facial droop and dysarthria  No prior history of stroke  Stat CTA head showed M2 occlusion of right MCA along with multiple sites of intracranial atherosclerosis along right A2, M1 and M3  Patient initiated on tPA and brought to unit      Deficits: Can answer yes/no questions appropriately and follow commands  Has 1/5 strength on LLE, 0/5 on LUE  Left facial droop  Has lots of thick, loose oral secretions and gurgling cough  Last 24 hours:   Goal BP maintained <180 without nicardipine gtt  Patient intubated this morning giving impending respiratory failure from fatigue  Will have 24 hour post tPA imaging today after noon  ---------------------------------------------------------------------------------------  SUBJECTIVE  Patient difficult to understand, but able to nod and speak single words  Answers "yes" too feeling bad/worse than yesterday and getting tired  Review of Systems   Cardiovascular: Negative for chest pain  Gastrointestinal: Negative for abdominal pain  Psychiatric/Behavioral: The patient is nervous/anxious  -------------------------------------------------------------------------------------------------------------  Assessment and Plan:    Neuro:    Diagnosis: Acute right M2 stroke  o Suspect source ruptured plaque in setting of uncontrolled HTN, present on admission, and elevated lipid panel  o Stroke alert called on floor at 1150 given acute right-sided weakness and dysarthria  o CTA head showed occlusion of right M2, along with atherosclerotic changes in right A2, M1, M3    o Plan: tPA administered at 18  o Transferred to ICU  o Continue neuro checks per tPA protocol  o 24 hour Post-tPA Non-contrast CT head showed new basal ganglia infarct  - Initiating aspirin 81 mg per neuro recommendations  o Echo done yesterday showed evidence of G1 diastolic dysfunction only  o Continue telemetry  o Stat CT head for neuro changes  o PMR/PT/OT/ST consulted  o MRI this admission  o BP goal <180/105 per neurology  - Patient maintaining goal BP on own while off of cardene gtt  o Hold DVT chemical prophylaxis until cleared by neurology  o     CV:    Diagnosis: Hypertension  o Likely contributed to stroke by shearing plaque  o BP in Systolic's >469 on admission  o Goal BP <180/105  o cardene gtt PRN   Diagnosis: HLD  o Statin once tolerating p o  medications   Diagnosis: grade I diastolic dysfunction  o Plan: maintain euvolemic status  - IVF hydration while NPO    Pulm:   Diagnosis: Impending respiratory failure  o Intubated 9/2 am for respiratory fatigue given increased work of breathing   Patient endorsed not feeling well and feeling tired from breathing so hard  - Maintain sedation with propofol  - Add fentanyl if necessary to keep comfortable  - SBT to be performed per respiratory  · Diagnosis:  o Presumed COPD exacerbation compounded on obesity hypoventilation, likely MIGUEL, and possible bronchitis   o  50+ PPD smoking history  o 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  o Leukocytosis 12 on admission  o Presented initially with SOB, rhonchi and wheezing  o Received solu-medrol 125 mg once  o Plan: continue Solu-Medrol 40 mg IV bid  o Q6hr Xopenex and Atrovent  o Eventual outpatient PFTs given COPD  o On empiric abx with    Diagnosis: MIGUEL  o Plan: intubated  o Consider high-flow if desaturating  o Likely will not tolerate BiPAP given coughing/secretions  - Initiate once more stable    GI:    Diagnosis: none  o Plan: prophylactic PPI given intubation    :    Diagnosis: BPH  o Plan: monitor I&O  o Maintain condom cath    F/E/N:    Fluids: gentle IVF hydration with isolyte while NPO   Electrolytes: k>4, phos >3, mag >2   Nutrition: NPO  o Speech eval pending      Heme/Onc:    Diagnosis: none  o Plan: monitor CBC    Endo:    Diagnosis: T2DM  o A1c 5 8%  o Plan: SSI coverage and glucose checks q6 hr while NPO    ID:    Diagnosis: no active issues  o Plan: continue to monitor CBC  o procal negative  o Leukocytosis likely reactive and 2/2 steroid use      MSK/Skin:    Diagnosis: none  o Plan: frequent repositioning      Disposition: Transfer to Critical Care   Code Status: Level 1 - Full Code  ---------------------------------------------------------------------------------------  ICU CORE MEASURES    Prophylaxis   VTE Pharmacologic Prophylaxis: Heparin  VTE Mechanical Prophylaxis: sequential compression device  Stress Ulcer Prophylaxis: Pantoprazole PO    ABCDE Protocol (if indicated)  Plan to perform spontaneous awakening trial today? No  Plan to perform spontaneous breathing trial today? No  Obvious barriers to extubation? Not applicable    Invasive Devices Review  Invasive Devices     Peripheral Intravenous Line            Peripheral IV Right;Ventral (anterior) Hand -- days    Peripheral IV 08/31/21 Right Wrist 1 day          Drain            External Urinary Catheter Medium <1 day              Can any invasive devices be discontinued today? No  ---------------------------------------------------------------------------------------  OBJECTIVE    Vitals   Vitals:    21 0413 21 0500 21 0551 21 0600   BP:  (!) 174/73  (!) 171/70   BP Location:       Pulse: 88 72  66   Resp: (!) 34 (!) 27  (!) 31   Temp:       TempSrc:       SpO2: 96% 97% 95% 95%   Weight:       Height:         Temp (24hrs), Av 5 °F (36 9 °C), Min:97 9 °F (36 6 °C), Max:98 9 °F (37 2 °C)  Current: Temperature: 98 9 °F (37 2 °C)    Invasive/non-invasive ventilation settings   Respiratory    Lab Data (Last 4 hours)    None         O2/Vent Data (Last 4 hours)    None                Physical Exam  Constitutional:       General: He is in acute distress  Appearance: He is not toxic-appearing or diaphoretic  Eyes:      Pupils: Pupils are equal, round, and reactive to light  Cardiovascular:      Rate and Rhythm: Normal rate and regular rhythm  Pulses: Normal pulses  Pulmonary:      Effort: Respiratory distress present  Breath sounds: Rhonchi present  No wheezing or rales  Comments: Snoring respirations in lower lung fields; gurgling in oropharynx with thick  secretions  Abdominal:      General: There is no distension  Palpations: Abdomen is soft  Tenderness: There is no abdominal tenderness  There is no guarding or rebound  Comments: obese   Musculoskeletal:      Right lower leg: No edema  Left lower leg: No edema  Neurological:      Mental Status: He is alert and oriented to person, place, and time  Comments: Unchanged focal deficits from ysterday  LLE 1, LUE 0/  Facial droop  Difficulty swallowing            Laboratory and Diagnostics:  Results from last 7 days   Lab Units 21  0457 21  1202 21  0627 21  0735   WBC Thousand/uL 15 92*  --  10 83* 12 44*   HEMOGLOBIN g/dL 14 5  --  14 7 14 9   I STAT HEMOGLOBIN g/dl  --  14 3  --   --    HEMATOCRIT % 44 1  --  45 1 45 0   HEMATOCRIT, ISTAT % --  42  --   --    PLATELETS Thousands/uL 256  --  273 264   NEUTROS PCT %  --   --   --  86*   MONOS PCT %  --   --   --  8     Results from last 7 days   Lab Units 09/02/21  0457 09/01/21  1202 09/01/21  1200 09/01/21  0627 08/31/21  0735   SODIUM mmol/L 145  --  140 141 139   POTASSIUM mmol/L 3 9  --  3 8 3 6 4 4   CHLORIDE mmol/L 113*  --  111* 110* 107   CO2 mmol/L 25  --  22 24 24   CO2, I-STAT mmol/L  --  28  --   --   --    ANION GAP mmol/L 7  --  7 7 8   BUN mg/dL 37*  --  27* 23 19   CREATININE mg/dL 0 98  --  1 07 0 98 1 06   CALCIUM mg/dL 8 2*  --  8 9 8 7 8 6   GLUCOSE RANDOM mg/dL 136  --  124 142* 116   ALT U/L  --   --   --   --  18   AST U/L  --   --   --   --  27   ALK PHOS U/L  --   --   --   --  144*   ALBUMIN g/dL  --   --   --   --  3 4*   TOTAL BILIRUBIN mg/dL  --   --   --   --  0 86          Results from last 7 days   Lab Units 09/01/21  1250 09/01/21  1250   INR  1 08  --    PTT seconds  --  30      Results from last 7 days   Lab Units 09/01/21  1200 08/31/21  1904 08/31/21  1655 08/31/21  0735   TROPONIN I ng/mL 0 05* 0 09* 0 12* 0 05*         ABG:    VBG:  Results from last 7 days   Lab Units 09/01/21  0627   PH JAMES  7 406*   PCO2 JAMES mm Hg 34 2*   PO2 JAMES mm Hg 73 4*   HCO3 JAMES mmol/L 21 0*   BASE EXC JAMES mmol/L -2 8     Results from last 7 days   Lab Units 09/01/21  1019 08/31/21  1555   PROCALCITONIN ng/ml 0 08 <0 05       Intake and Output  I/O       08/31 0701 - 09/01 0700 09/01 0701 - 09/02 0700    P  O  0 0    I V  (mL/kg)  1002 9 (8 1)    Total Intake(mL/kg) 0 (0) 1002 9 (8 1)    Urine (mL/kg/hr) 250 980 (0 3)    Total Output 250 980    Net -250 +22 9          Unmeasured Urine Occurrence  1 x        Height and Weights   Height: 5' 10" (177 8 cm) (per 6/4/21 encounter)  IBW (Ideal Body Weight): 73 kg  Body mass index is 39 1 kg/m²    Weight (last 2 days)     Date/Time   Weight    09/01/21 1431   124 (272 49)    09/01/21 0600   117 (257 9)    08/31/21 0725   128 (283) Nutrition       Diet Orders   (From admission, onward)             Start     Ordered    09/01/21 1652  Diet NPO  Diet effective now     Question Answer Comment   Diet Type NPO    RD to adjust diet per protocol? No        09/01/21 1651              Active Medications  Scheduled Meds:  Current Facility-Administered Medications   Medication Dose Route Frequency Provider Last Rate    albuterol  2 5 mg Nebulization Q4H PRN Cat Robles PA-C      hydrALAZINE  10 mg Intravenous Q6H PRN LIYA Delgado      insulin lispro  2-12 Units Subcutaneous Q6H [address] Caprice Loi,       insulin lispro  2-12 Units Subcutaneous 0200 Cassie Monsivais DO      ipratropium  0 5 mg Nebulization TID Cat Robles PA-C      Labetalol HCl  10 mg Intravenous Once Cat Robles PA-C      Labetalol HCl  10 mg Intravenous Q4H PRN LIYA Delgado      levalbuterol  1 25 mg Nebulization TID Cat Robles PA-C      methylPREDNISolone sodium succinate  40 mg Intravenous Q12H [address] Tima Wan      multi-electrolyte  100 mL/hr Intravenous Continuous Capkimber Monsivais  mL/hr (09/01/21 1702)    niCARdipine  1-15 mg/hr Intravenous Titrated Hannah Hernández MD Stopped (09/01/21 2212)    sodium chloride  50 mL Intravenous Once Cat Robles PA-C       Continuous Infusions:  multi-electrolyte, 100 mL/hr, Last Rate: 100 mL/hr (09/01/21 1702)  niCARdipine, 1-15 mg/hr, Last Rate: Stopped (09/01/21 2212)      PRN Meds:   albuterol, 2 5 mg, Q4H PRN  hydrALAZINE, 10 mg, Q6H PRN  Labetalol HCl, 10 mg, Q4H PRN        Allergies   No Known Allergies  ---------------------------------------------------------------------------------------  Advance Directive and Living Will:      Power of :    POLST:    ---------------------------------------------------------------------------------------      Chava Albert,       Portions of the record may have been created with voice recognition software  Occasional wrong word or "sound a like" substitutions may have occurred due to the inherent limitations of voice recognition software    Read the chart carefully and recognize, using context, where substitutions have occurred

## 2021-09-02 NOTE — OCCUPATIONAL THERAPY NOTE
Occupational Therapy         Patient Name: Johnnie Luther  AIFUZ'Y Date: 9/2/2021 09/02/21 1100   OT Last Visit   OT Visit Date 09/02/21   Note Type   Note type Evaluation   Cancel Reasons Intubated/sedated     Cayden Das, OT

## 2021-09-02 NOTE — CASE MANAGEMENT
Pt is not a <30 day readmission or current bundle  Pt intubated/sedated  CM placed call to pt's emergency contact, daughter Emmett Christianson ([phone]) to introduce self/CM role and begin discharge planning  Pt is legally  but still together with his longtime spouse  His spouse has mild baseline dementia  Pt has two children, sakina-Nichole and maulik-Meet  Pt lives with his spouse in an apartment at [facility] since May  Pt independent with ADLs PTA  Ambulating independently but per daughter pt lives a sedentary lifestyle  DME: none  Pt was driving to grocery store prior to admission  No history of VNA, STR, inpatient MH treatment or D/A treatment  Pharmacy: [address] [place] [address]  PCP: Palma NICKERSON  AD/LW: None  CM to follow for d/c planning  CM reviewed d/c planning process including the following: identifying help at home, patient preference for d/c planning needs, Discharge Lounge, [facility] Meds to Bed program, availability of treatment team to discuss questions or concerns patient and/or family may have regarding understanding medications and recognizing signs and symptoms once discharged  CM also encouraged patient to follow up with all recommended appointments after discharge  Patient advised of importance for patient and family to participate in managing patients medical well being

## 2021-09-02 NOTE — RESPIRATORY THERAPY NOTE
RT Ventilator Management Note  Yen Galvan 79 y o  male MRN: [id number]  Unit/Bed#: ICU 04 Encounter: [id number]      Daily Screen       9/2/2021  0839             Patient safety screen outcome[de-identified]  Passed    Spont breathing trial % for 30 min:  No            Physical Exam:   Assessment Type: Assess only  General Appearance: Sedated  Respiratory Pattern: Assisted  Chest Assessment: Chest expansion symmetrical  Bilateral Breath Sounds: Diminished  R Breath Sounds: Diminished  L Breath Sounds: Diminished  Cough: Weak  Suction: ET Tube  O2 Device: NRB      Resp Comments: Pt intubated at the bedside with a  #8 ETT, 24@ lip and placed on CMV mode 16/500/+6/50%  Pt sedated and tolerating settings well  VSS  Will continue to monitor

## 2021-09-02 NOTE — PROGRESS NOTES
Progress Note - Neurology   William Dumont 79 y o  male MRN: [id number]  Unit/Bed#: ICU 04 Encounter: [id number]      Assessment/Plan     Left-sided weakness  Assessment & Plan  William Dumont is a 79 y o  male with HTN, HLD, tobacco abuse, COPD, BPH who presented to [place] ED on 08/31/2021 following a fall at home  Stroke alert called on 09/01/2021 for new onset left upper and lower extremity weakness  NIHSS 12 at time of stroke alert  Left upper and lower extremity weakness, facial weakness, and severely dysarthric with a right gaze preference  CT head unremarkable for acute intracranial abnormalities  CTA head neck reveal significant stenosis of Right M1, M2, and distal M3, and right A2  Radiologist notes Right M2 occlusion  Patient felt not to be an IR candidate per neurosurgery     TPA Decision: TPA given this admission  After a discussion of risks and benefits reviewing inclusion and exclusion criteria the decision was made to proceed with thrombolytic therapy  Plan:  - Continue stroke pathway  - Repeat CTH 24h post tPA  - MRI brain without contrast pending    - Echocardiogram reviewed, EF 55%, no diagnostic regional wall motion abnormality was identified  L atrium [id number] dilated  Mild to moderate annular calcification   - Hemoglobin A1c reviewed, 5 8    - Fasting lipid panel reviewed, total cholesterol 219, triglycerides 80, HDL 52,    - Hold aspirin, anticoagulation, and DVT ppx 24h post tPA  If 24 hour post tPA CTH negative for hemorrhage, would recommend initiate ASA 81 mg QD    - Recommend starting Atorvastatin 40 mg daily when able    - Strict BP control <180/105  - Telemetry  - PT/OT/Speech  - Frequent neuro checks  - STAT CTH for acute change  - Continue to monitor notify with changes    Imaging/Labs:  - CT head 08/31/2021: No acute intracranial abnormalities   - CT C-spine 08/31/2021: No fracture or traumatic malalignment   - Bilateral lower extremity duplex study:  · No evidence of acute or chronic DVT or superficial thrombophlebitis in BLE  - CT head 09/01:  · No acute intracranial abnormalities  · Chronic lacunar infarcts in left lentiform nucleus and centrum semiovale    - CTA head and neck 09/01:  · Radiology noting Right M2 occlusion; per discussion and review with attending neurologist, not entirely convincing for an acute occlusion   · Intracranial atherosclerosis with stenosis along Right A2, Right M1, and distal Right M3    Essential hypertension  Assessment & Plan  - Strict BP control, <180/105   - Management per ICU team     * Shortness of breath  Assessment & Plan  - Patient intubated this AM   - Ventilator management as per critical care team          Recommendations for outpatient neurological follow up have yet to be determined  Subjective:   Per critical care team, patient intubated this AM for respiratory failure  Patient reportedly neurologically stable prior to intubation  ROS:  Unable to reliably assess secondary to mental status/recently intubated      Medications  Scheduled Meds:  Current Facility-Administered Medications   Medication Dose Route Frequency Provider Last Rate    albuterol  2 5 mg Nebulization Q4H PRN Shelby Tolliver PA-C      fentaNYL  50 mcg Intravenous Q5 Min PRN Maria Ines Roque DO      hydrALAZINE  10 mg Intravenous Q6H PRN LIYA Perales      insulin lispro  2-12 Units Subcutaneous Q6H [facility] Cassie Monsivais DO      insulin lispro  2-12 Units Subcutaneous 0200 Cassie Monsivais DO      ipratropium  0 5 mg Nebulization TID Cat Robles PA-C      Labetalol HCl  10 mg Intravenous Once Cat Robles PA-C      Labetalol HCl  10 mg Intravenous Q4H PRN LIYA Shearer      levalbuterol  1 25 mg Nebulization TID Cat Robles PA-C      methylPREDNISolone sodium succinate  40 mg Intravenous Q12H [facility] Cat Robles PA-C      multi-electrolyte  100 mL/hr Intravenous Continuous Capricimani Monsivais  mL/hr (09/01/21 1702)    niCARdipine  1-15 mg/hr Intravenous Titrated Liz Oviedo MD Stopped (09/01/21 2212)    norepinephrine           propofol  5-50 mcg/kg/min Intravenous Titrated Osmin Roque DO      sodium chloride  50 mL Intravenous Once Cat Robles PA-C       Continuous Infusions:multi-electrolyte, 100 mL/hr, Last Rate: 100 mL/hr (09/01/21 1702)  niCARdipine, 1-15 mg/hr, Last Rate: Stopped (09/01/21 2212)  propofol, 5-50 mcg/kg/min      PRN Meds:   albuterol    fentaNYL    hydrALAZINE    Labetalol HCl    Vitals: Blood pressure (!) 171/70, pulse 64, temperature 98 9 °F (37 2 °C), temperature source Oral, resp  rate (!) 29, height 5' 10" (1 778 m), weight 124 kg (272 lb 7 8 oz), SpO2 95 %  ,Body mass index is 39 1 kg/m²  Physical Exam: /74   Pulse 66   Temp 98 9 °F (37 2 °C) (Oral)   Resp 16   Ht 5' 10" (1 778 m) Comment: per 6/4/21 encounter  Wt 124 kg (272 lb 7 8 oz)   SpO2 99%   BMI 39 10 kg/m²   General appearance: Opens eyes to noxious stimuli  Head: Normocephalic, without obvious abnormality, atraumatic  Eyes: negative findings: lids and lashes normal, conjunctivae and sclerae normal and pupils equal, round, reactive to light and accomodation, no blink to threat B/L  Lungs: clear to auscultation bilaterally and intubated  Heart: regular rate and rhythm  Abdomen: Soft, not distended  Extremities: extremities normal, warm and well-perfused; no cyanosis, clubbing, or edema  Skin: Skin color, texture, turgor normal  No rashes or lesions  Neurologic: Mental status: Opens eyes to noxious stimuli  Cranial nerves: II: pupils equal, round, reactive to light and accommodation, III,VII: ptosis no ptosis noted  Sensory: Unable to reliably assess secondary to mental status, but does grimace to noxious stimuli throughout  Motor: Spontaneous movement noted RUE and B/L LE, shoulder shrug noted LUE to noxious stimuli    Reflexes: 1+ B/L UE and R patellar, 2+ L patellar, absent at ankles B/L  Toes downgoing B/L    Labs  Recent Results (from the past 24 hour(s))   Procalcitonin Reflex    Collection Time: 09/01/21 10:19 AM   Result Value Ref Range    Procalcitonin 0 08 <=0 25 ng/ml   Basic metabolic panel    Collection Time: 09/01/21 12:00 PM   Result Value Ref Range    Sodium 140 136 - 145 mmol/L    Potassium 3 8 3 5 - 5 3 mmol/L    Chloride 111 (H) 100 - 108 mmol/L    CO2 22 21 - 32 mmol/L    ANION GAP 7 4 - 13 mmol/L    BUN 27 (H) 5 - 25 mg/dL    Creatinine 1 07 0 60 - 1 30 mg/dL    Glucose 124 65 - 140 mg/dL    Calcium 8 9 8 3 - 10 1 mg/dL    eGFR 66 ml/min/1 73sq m   Troponin I    Collection Time: 09/01/21 12:00 PM   Result Value Ref Range    Troponin I 0 05 (H) <=0 04 ng/mL   POCT Blood Gas (CG8+)    Collection Time: 09/01/21 12:02 PM   Result Value Ref Range    ph, Denny ISTAT 7 453 (HH) 7 300 - 7 400    pCO2, Dneny i-STAT 37 7 (L) 42 0 - 50 0 mm HG    pO2, Denny i-STAT 49 0 (H) 35 0 - 45 0 mm HG    BE, i-STAT 2 -2 - 3 mmol/L    HCO3, Denny i-STAT 26 4 24 0 - 30 0 mmol/L    CO2, i-STAT 28 21 - 32 mmol/L    O2 Sat, i-STAT 86 (H) 60 - 85 %    SODIUM, I-STAT 143 136 - 145 mmol/l    Potassium, i-STAT 3 9 3 5 - 5 3 mmol/L    Hct, i-STAT 42 36 5 - 49 3 %    Hgb, i-STAT 14 3 12 0 - 17 0 g/dl    Glucose, i-STAT 131 65 - 140 mg/dl    POC FIO2 29 L    Specimen Type VENOUS    APTT    Collection Time: 09/01/21 12:50 PM   Result Value Ref Range    PTT 30 23 - 37 seconds   Protime-INR    Collection Time: 09/01/21 12:50 PM   Result Value Ref Range    Protime 13 5 11 6 - 14 5 seconds    INR 1 08 0 84 - 1 19   ECG 12 lead    Collection Time: 09/01/21  1:18 PM   Result Value Ref Range    Ventricular Rate 81 BPM    Atrial Rate 81 BPM    RI Interval 138 ms    QRSD Interval 144 ms    QT Interval 420 ms    QTC Interval 487 ms    P Axis 82 degrees    QRS Axis -29 degrees    T Wave Axis 85 degrees   Fingerstick Glucose (POCT)    Collection Time: 09/01/21  5:36 PM   Result Value Ref Range    POC Glucose 127 65 - 140 mg/dl Fingerstick Glucose (POCT)    Collection Time: 09/02/21 12:48 AM   Result Value Ref Range    POC Glucose 125 65 - 140 mg/dl   Fingerstick Glucose (POCT)    Collection Time: 09/02/21  4:16 AM   Result Value Ref Range    POC Glucose 130 65 - 140 mg/dl   CBC    Collection Time: 09/02/21  4:57 AM   Result Value Ref Range    WBC 15 92 (H) 4 31 - 10 16 Thousand/uL    RBC 4 78 3 88 - 5 62 Million/uL    Hemoglobin 14 5 12 0 - 17 0 g/dL    Hematocrit 44 1 36 5 - 49 3 %    MCV 92 82 - 98 fL    MCH 30 3 26 8 - 34 3 pg    MCHC 32 9 31 4 - 37 4 g/dL    RDW 14 9 11 6 - 15 1 %    Platelets 259 242 - 418 Thousands/uL    MPV 10 5 8 9 - 12 7 fL   Basic metabolic panel    Collection Time: 09/02/21  4:57 AM   Result Value Ref Range    Sodium 145 136 - 145 mmol/L    Potassium 3 9 3 5 - 5 3 mmol/L    Chloride 113 (H) 100 - 108 mmol/L    CO2 25 21 - 32 mmol/L    ANION GAP 7 4 - 13 mmol/L    BUN 37 (H) 5 - 25 mg/dL    Creatinine 0 98 0 60 - 1 30 mg/dL    Glucose 136 65 - 140 mg/dL    Calcium 8 2 (L) 8 3 - 10 1 mg/dL    eGFR 73 ml/min/1 73sq m   Magnesium    Collection Time: 09/02/21  4:57 AM   Result Value Ref Range    Magnesium 2 8 (H) 1 6 - 2 6 mg/dL   Phosphorus    Collection Time: 09/02/21  4:57 AM   Result Value Ref Range    Phosphorus 2 9 2 3 - 4 1 mg/dL     Imaging   No new neuro imaging available for review  Counseling / Coordination of Care  Total time spent today 30 minutes  Greater than 50% of total time was spent with the patient and / or family counseling and / or coordination of care  A description of the counseling / coordination of care: Time spent reviewing case, imaging and also discussing plan of care with critical care team  Will plan for 24 hour post-tPA CTH today, awaiting MRI brain as well

## 2021-09-02 NOTE — SPEECH THERAPY NOTE
Speech Language/Pathology    Order received, chart reviewed  Pt currently intubated  ST to sign-off at this time  Please re-consult when extubated and appropriate

## 2021-09-03 NOTE — PROGRESS NOTES
[address] [facility]  Progress Note - Antonio Knife 1940, 79 y o  male MRN: [id number]  Unit/Bed#: ICU 04 Encounter: [id number]  Primary Care Provider: Emelia Medel MD   Date and time admitted to hospital: 8/31/2021  7:09 AM    Left-sided weakness  Assessment & Plan  78 yo male with HTN, HLD, tobacco abuse, COPD, BPH who presented to [place] ED on 08/31/2021 following a fall at home  Stroke alert called on 09/01/2021 for new onset left upper and lower extremity weakness  NIHSS 12  LUE/LLE,  facial weakness, and severely dysarthric with a right gaze preference  CT head unremarkable for acute intracranial abnormalities  CTA head neck reveal significant stenosis of Right M1, M2, and distal M3, and right A2  Radiologist notes Right M2 occlusion  Patient felt not to be an IR candidate per neurosurgery  IV tPa given  · Repeat CT head: stable with no acute hemorrhage  · Echocardiogram: LVEF 55%, no diagnostic regional wall motion abnormality, grade 1 diastolic dysfunction  Left atrium mildly dilated  Mild to moderate annular calcification  · MRI Brain revealed acute infarct in the  right basal ganglia and corona radiata, chronic left lacunar infarcts, and small chronic microhemorrhages are scattered within the brain, possibly secondary to chronic hypertension   Concern for possible CAA     Plan:  - recommend CRYSTAL, If negative consider Loop recorder to assess for arrhythmia  - telemetry monitoring   - permissive hypertension with SBP goal < 180 mmHg  - initiate ASA 81 mg daily  - initiate high dose statin when able, , HDL 52, triglycerides 80  - recommend euglycemia, HgA1c  5 8    - PT/OT/SL  - Frequent neuro checks, STAT CT Head for acute changes, and notify neurology    Imaging/Labs:  - 8/31/21 CT head: No acute intracranial abnormalities   - 8/31/2021CT Cervical spine: No fracture or traumatic malalignment   - 8/31/21 Bilateral lower extremity duplex study: No evidence of acute or chronic DVT or superficial thrombophlebitis in BLE  - 9/1/21 CT head: No acute intracranial abnormalities  Chronic lacunar infarcts in left lentiform nucleus and centrum semiovale    - 9/1/21 CTA head and neck: Radiology noting Right M2 occlusion; per discussion and review with attending neurologist, not entirely convincing for an acute occlusion  Intracranial atherosclerosis with stenosis along Right A2, Right M1, and distal Right M3  - 9/3/21 Acute infarct within the right basal ganglia and corona radiata  No hemorrhagic transformation  Additional chronic microangiopathic change within the brain parenchyma including old lacunar infarcts  Small chronic microhemorrhages are scattered within the brain, possibly secondary to chronic hypertension  No acute hemorrhage  Essential hypertension  Assessment & Plan  - recommend permissive hypertension, SBP goal < 160 mmHg  - Management per ICU team     * Shortness of breath  Assessment & Plan  - Patient remains intubated on propofol  - Ventilator management as per critical care team        Recommendations for outpatient neurological follow up have yet to be determined  Subjective:   Patient remains intubated and sedated on propofol  Review of Systems   Unable to perform ROS: Intubated       Vitals: Blood pressure (!) 171/74, pulse (!) 106, temperature 97 9 °F (36 6 °C), temperature source Oral, resp  rate (!) 41, height 5' 10" (1 778 m), weight 128 kg (281 lb 8 oz), SpO2 97 %  ,Body mass index is 40 39 kg/m²      MEDS:  Current Facility-Administered Medications   Medication Dose Route Frequency Provider Last Rate    albuterol  2 5 mg Nebulization Q4H PRN Cat Robles PA-C      amLODIPine  10 mg Oral Daily Elen Lee MD      aspirin  81 mg Oral Daily Cassie Hinds DO      dexmedetomidine  0 1-0 7 mcg/kg/hr Intravenous Titrated Elen Lee MD 0 1 mcg/kg/hr (09/03/21 1124)    fentanyl citrate (PF)  50 mcg Intravenous Q1H PRN Johanne LIYA Oakley      heparin (porcine)  5,000 Units Subcutaneous [facility] Cassie Hinds DO      hydrALAZINE  10 mg Intravenous Q6H PRN Mireya Gurrola MD      insulin lispro  2-12 Units Subcutaneous Q6H [address] Capricimani Hinds DO      Labetalol HCl  10 mg Intravenous Q4H PRN Mireya Gurrola MD      multi-electrolyte  100 mL/hr Intravenous Continuous Capkimber Monsivais  mL/hr (09/03/21 1114)    polyethylene glycol  17 g Oral Daily Capkimber Monsivais DO      senna  8 8 mg Oral HS Dollene Jeans, DO     :      Physical Exam  Constitutional:       Appearance: He is ill-appearing  Interventions: He is sedated and intubated  HENT:      Head: Normocephalic and atraumatic  Mouth/Throat:      Mouth: Mucous membranes are moist       Comments: ETT and OGT in place  Eyes:      General:         Right eye: No discharge  Left eye: No discharge  Conjunctiva/sclera: Conjunctivae normal       Pupils: Pupils are equal, round, and reactive to light  Cardiovascular:      Rate and Rhythm: Regular rhythm  Heart sounds: Normal heart sounds  No murmur heard  Pulmonary:      Effort: Pulmonary effort is normal  No respiratory distress  He is intubated  Breath sounds: Normal breath sounds  Abdominal:      General: There is no distension  Palpations: Abdomen is soft  Musculoskeletal:         General: No swelling  Skin:     General: Skin is warm and dry  Neurologic Exam     Mental Status   Propofol on hold 15 minutes  Eyes open to voice, tracks examiner  Spontaneously moves extremities (right >left)  Intermittently follows commands  Makes a fist right hand, shows thumb, holds RUE/RLE to gravity     Cranial Nerves     CN II   Visual acuity: (does not blink to threat)    CN III, IV, VI   Pupils are equal, round, and reactive to light  Right pupil: Size: 3 mm  Shape: regular  Reactivity: brisk  Left pupil: Size: 3 mm  Shape: regular  Reactivity: brisk     Exam limited, patient intubated  Motor Exam   Muscle bulk: normal  Overall muscle tone: normalSpontaneously moves all extremities (R>L)  Strength testing limited, sedation on hold  Able to hold RUE/RLE to gravity, lifts LLE off bed, LUE antigravity, able to extend LUE but does not  hand or flex bicep  Sensory Exam   Localizes RUE, RLE, LLE to light tactile stimulation, Extends LUE to noxious stimuli     Gait, Coordination, and Reflexes     Reflexes   Right plantar: normal  Left plantar: equivocal (slight upgoing with repeat testing)  Right ankle clonus: absent  Trace reflexes throughout all extremities  Lab Results:   I have personally reviewed pertinent reports      CBC:   Results from last 7 days   Lab Units 09/03/21 0455 09/02/21  0457 09/01/21  1202 09/01/21  0627   WBC Thousand/uL 13 95* 15 92*  --  10 83*   RBC Million/uL 4 30 4 78  --  4 94   HEMOGLOBIN g/dL 12 7 14 5  --  14 7   I STAT HEMOGLOBIN g/dl  --   --  14 3  --    HEMATOCRIT % 40 3 44 1  --  45 1   HEMATOCRIT, ISTAT %  --   --  42  --    MCV fL 94 92  --  91   PLATELETS Thousands/uL 224 256  --  273   , BMP/CMP:   Results from last 7 days   Lab Units 09/03/21 0455 09/02/21 0457 09/01/21  1202 09/01/21  1200 08/31/21  0735   SODIUM mmol/L 142 145  --  140 139   POTASSIUM mmol/L 3 7 3 9  --  3 8 4 4   CHLORIDE mmol/L 112* 113*  --  111* 107   CO2 mmol/L 27 25  --  22 24   CO2, I-STAT mmol/L  --   --  28  --   --    BUN mg/dL 35* 37*  --  27* 19   CREATININE mg/dL 0 89 0 98  --  1 07 1 06   GLUCOSE, ISTAT mg/dl  --   --  131  --   --    CALCIUM mg/dL 8 3 8 2*  --  8 9 8 6   AST U/L  --   --   --   --  27   ALT U/L  --   --   --   --  18   ALK PHOS U/L  --   --   --   --  144*   EGFR ml/min/1 73sq m 81 73  --  66 66   HgBA1C:   Results from last 7 days   Lab Units 08/31/21  0735   HEMOGLOBIN A1C % 5 8*   TSH:   Results from last 7 days   Lab Units 08/31/21  0735   TSH 3RD GENERATON uIU/mL 1 810   Coagulation:   Results from last 7 days Lab Units 09/01/21  1250   INR  1 08   Lipid Profile:   Results from last 7 days   Lab Units 08/31/21  0735   HDL mg/dL 52   LDL CALC mg/dL 151*   TRIGLYCERIDES mg/dL 80     Imaging Studies: I have personally reviewed pertinent reports  and I have personally reviewed pertinent films in PACS     EEG, Pathology, and Other Studies: I have personally reviewed pertinent reports  and I have personally reviewed pertinent films in PACS    VTE Prophylaxis: Sequential compression device (Venodyne)      Counseling / Coordination of Care  Total time spent today 25 minutes  Greater than 50% of total time was spent with the patient and / or family counseling and / or coordination of care   A description of the counseling / coordination of care: Coordination of care with Critical Care team and RN

## 2021-09-03 NOTE — NUTRITION
Recommend increase Jevity 1 2 by 10 ml q 4 hrs as lane to goal rate of 66 ml/hr to provide qd: 1584ml, 1901 kcal,88 gm PRO,268 gm CHO,62 gm Fat,1278 ml Free H2O,1 45 mg CHO/kg/min  Adjust magnesium  Recommend 100 ml Free H2O flush q 4 hrs

## 2021-09-03 NOTE — RESPIRATORY THERAPY NOTE
resp care      09/03/21 1536   Respiratory Assessment   Resp Comments PSV decreased to 8 with good VT  Will cont to monitor pts resp status  Vent Information   Pawling C3/G5 Vent Mode SPONT   $ Pulse Oximetry Spot Check Charge Completed   SpO2 97 %   SPONT Settings   FIO2 (%) 40 %   PEEP (cmH2O) 6 cmH2O   Pressure Support (cmH2O) 8 cmH20   Flow Trigger (LPM) 5 LPM   P-ramp (ms) 50 ms   ETS  (%) 25 %   SPONT Actuals   Resp Rate (BPM) 17 BPM   VT (mL) 587 mL   MV (Obs) 8   MAP (cmH2O) 10 cmH2O   Peak Pressure (cmH2O) 15 cmH2O   RSBI (f/vt) 24 f/Vt   Heater Temperature (Obs) 97 7 °F (36 5 °C)   SPONT Alarms   High Peak Pressure (cmH20) 40 cmH2O   High Resp Rate (BPM) 40 BPM   High MV (L/min) 25 L/min   Low MV (L/min) 4 L/min   High Spont VTE (mL) 1100 mL   Low Spont VTE (mL) 250 mL   Apnea Time (S) 20 S   SPONT Apnea Settings   Resp Rate (BPM) 8 BPM   FIO2 (%) 100 %   %TI (%) 1 %   Apnea Time (S) 20 S   Maintenance   Alarm (pink) cable attached No   Resuscitation bag with peep valve at bedside Yes   Water bag changed No   Circuit changed No   Daily Screen   Patient safety screen outcome: Passed   Spont breathing trial % for 30 min No   ETT  Oral;Cuffed 8 mm   Placement Date/Time: 09/02/21 0839   Mask Ventilation: Mask ventilation not attempted (0)  Preoxygenated: Yes  Technique: Rapid sequence;Video laryngoscopy  Type: Oral;Cuffed  Tube Size: 8 mm  Laryngoscope: Cain  Blade Size: 4  Location: Oral  Grad       Secured at (cm) 24   Measured from [facility] [address] Right   Repositioned Center to Right   Secured by Commercial tube rice   HI-LO Suction  Intermittent suction

## 2021-09-03 NOTE — RESPIRATORY THERAPY NOTE
resp care      09/03/21 1042   Respiratory Assessment   Resp Comments PSV increased to 10 with no change in pts resp status  Pt cont to be agitated and tachypneic  Pt placed on imv and psv 10  No change in pts resp status  Now sx hemop secretions      Vent Information   Rod C3/G5 Vent Mode SIMV   SIMV Settings   Resp Rate (BPM) 8 BPM   VT (mL) 500 mL   FiO2 (%) 40 %   PEEP (cmH2O) 6 cmH2O   Pressure Support (cmH2O) 10 cmH2O   Insp Time (S) 0 9 sec   Flow Trigger (LPM) 5 LPM   P-ramp (ms) 50 (ms)   ETS (%) 25 %   Humidification Heater   SIMV Actuals   Resp Rate (BPM) 31 BPM   VT (mL) 616   MV (Obs) 21   MAP (cm H2O) 12   Peak Pressure (cmH2O) 22 cmH2O   I:E Ratio (Obs) 1/1   SIMV ALARMS   High Peak Pressure (cmH2O) 40 cm H2O   High Resp Rate (BPM) 40 BPM   High MV (L/min) 25 L/min   Low MV (L/min) 4 L/min   High VT (mL) 1100 mL   Low VT (mL) 250 mL   Apnea Time (s) 20 S

## 2021-09-03 NOTE — RESPIRATORY THERAPY NOTE
resp care      09/03/21 1259   Respiratory Assessment   Resp Comments Pt placed back on psv wean  Now on precedex and pt is calm

## 2021-09-03 NOTE — RESPIRATORY THERAPY NOTE
RT Ventilator Management Note  Duong Nunez 79 y o  male MRN: [id number]  Unit/Bed#: ICU 04 Encounter: [id number]      Daily Screen       9/2/2021  0839 9/3/2021  0702          Patient safety screen outcome[de-identified]  Passed  Failed      Not Ready for Weaning due to[de-identified]  --  Poor inspiratory effort      Spont breathing trial % for 30 min:  No  --              Physical Exam:   Assessment Type: Assess only  General Appearance: Sedated  Respiratory Pattern: Assisted  Chest Assessment: Chest expansion symmetrical  Bilateral Breath Sounds: Diminished, Coarse  Suction: ET Tube  O2 Device: g5      Resp Comments: (P) Pt sedate on vent  No spont effort with propofol on  BS clear, sx for no secretions  Will cont to monitor for SBT

## 2021-09-03 NOTE — OCCUPATIONAL THERAPY NOTE
Occupational Therapy         Patient Name: Martin BURKETTP Date: 9/3/2021       09/03/21 1400   OT Last Visit   OT Visit Date 09/03/21   Note Type   Note type Evaluation   Cancel Reasons Intubated/sedated   Cognition   Orientation Level Unable to assess     See Castro OT

## 2021-09-03 NOTE — RESPIRATORY THERAPY NOTE
resp care      09/03/21 1126   Respiratory Assessment   Resp Comments Pt started on precedex and changed back to ac mode

## 2021-09-03 NOTE — RESPIRATORY THERAPY NOTE
resp care      09/03/21 1035   Inhalation Therapy Tx   $ Inhalation Therapy Performed Yes   Pre-Treatment Pulse 93   Breath Sounds Pre-Treatment Bilateral Diminished;Clear   Resp Comments Pt weaning on 6psv  Became agitated and tachypneic  UDN given propofol increased to 15

## 2021-09-03 NOTE — PROGRESS NOTES
Daily Progress Note - Critical Care   Maia Meraz 79 y o  male MRN: [id number]  Unit/Bed#: ICU 04 Encounter: [id number]      ----------------------------------------------------------------------------------------  HPI/24hr events:  79 y o  male with HTN, current smoker x 50+ years, HLD, obesity, BPH initially presented to [facility] with acute hypoxic respiratory failure and syncope  Admitted for COPD exacerbation, started on steroids, nebs, and BiPAP  Did have non-MI troponin elevation on admission which has since trended down  Echo done yesterday with some grade I diastolic dysfunction  Patient no acutely volume overloaded on exam or imaging  CT chest with airspace disease, but no acute consolidations  Tracheomalacia also present      On 9/1 a stroke alert was called given acute left sided UE and LE weakness, left facial droop and dysarthria  No prior history of stroke  Stat CTA head showed M2 occlusion of right MCA along with multiple sites of intracranial atherosclerosis along right A2, M1 and M3  Patient initiated on tPA and brought to unit      Deficits: Can answer yes/no questions appropriately and follow commands  Has 1/5 strength on LLE, 0/5 on LUE  Left facial droop  Has lots of thick, loose oral secretions and gurgling cough  Last 24 hours:   Intubated yesterday morning for impending respiratory failure 2/2 fatigue  Post-TPA 24 hour scan showed new infarct in basal ganglia  MRI brain overnight- pending read  Propofol sedation    SBP goal <180 being met without antihypertensives    Summary of plan for today:  -extubation trial  -d/c propofol  -start precedex and wean as appropriate  -possible bronch for LLL consolidation  -palliative consult for goals of care  -starting enteral feeds  -bowel regimen    ---------------------------------------------------------------------------------------  SUBJECTIVE    Review of Systems   Unable to perform ROS: Intubated -------------------------------------------------------------------------------------------------------------  Assessment and Plan:    Neuro:    Diagnosis: Acute right M2 stroke  o Suspect source ruptured plaque in setting of uncontrolled HTN, present on admission, and elevated lipid panel  o Stroke alert called on floor at 1150 given acute right-sided weakness and dysarthria  o CTA head showed occlusion of right M2, along with atherosclerotic changes in right A2, M1, M3    o tPA administered at 1238; transferred to ICU  o 24 hour Post-tPA Non-contrast CT head showed new basal ganglia infarct  o MRI with stable findings from CT  o Echo with grade I diastolic dysfunction  o Plan:  o Neuro checks q4hr  o aspirin 81 mg qd  o lipitor 40 qd  o Continue telemetry  o Goal -160  - Norvasc 10 mg qd  - PRN hydralazine and labetolol  o DVT subQ heparin  o PT/OT    CV:    Diagnosis: Hypertension  o Likely contributed to stroke by shearing plaque  o BP in Systolic's >794 on admission  o Plan:  o Goal -160  - Norvasc 10 mg qd  - PRN hydralazine and labetolol   Diagnosis: HLD  o Statin once tolerating p o  medications   Diagnosis: grade I diastolic dysfunction  o Plan: maintain euvolemic status  - IVF hydration while NPO    Pulm:   Diagnosis: Impending respiratory failure  o Intubated 9/2 am for respiratory fatigue given increased work of breathing   Patient endorsed not feeling well and feeling tired from breathing so hard  o Thick upper airway secretions  - D/c propofol  - Start precedex  - Add fentanyl if necessary to keep comfortable  - Extubation trials as appropriate  - Goals of care discussion with family  · Diagnosis: tracheomalacia  · Saber-sheath trache on CT per pulm  · Candidate would not be an optimal trach candidate in setting of this condition  · Diagnosis: COPD?  o Admission for presumed COPD exacerbation compounded on obesity hypoventilation, likely MIGUEL, and possible bronchitis   o  50+ PPD smoking history  o 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  o Initially started on COPD exacerbation steroids and nebs, but d/c given low suspicion  o Plan:  - PFTs outpatient   Diagnosis: MIGUEL  o Plan: intubated    GI:    Diagnosis: none  o Plan: PPI  o Starting enteral feeds  o Bowel regimen with senna HS and miralax    :    Diagnosis: BPH  o Plan: monitor I&O  o Maintain condom cath  o Urinary retention protocol    F/E/N:    Fluids: gentle IVF hydration with isolyte until at goal feeds   Electrolytes: k>4, phos >3, mag >2   Nutrition: enteral feeds, start trickle to goal of 100 cc/hr    Heme/Onc:    Diagnosis: none  o Plan: monitor CBC    Endo:    Diagnosis: none  o A1c 5 8%    ID:    Diagnosis: no active issues  o Plan: continue to monitor CBC    MSK/Skin:    Diagnosis: none  o Plan: frequent repositioning      Disposition: Transfer to Critical Care   Code Status: Level 1 - Full Code  ---------------------------------------------------------------------------------------  ICU CORE MEASURES    Prophylaxis   VTE Pharmacologic Prophylaxis: Heparin  VTE Mechanical Prophylaxis: sequential compression device  Stress Ulcer Prophylaxis: Pantoprazole PO    ABCDE Protocol (if indicated)  Plan to perform spontaneous awakening trial today? Yes  Plan to perform spontaneous breathing trial today? Yes  Obvious barriers to extubation? No    Invasive Devices Review  Invasive Devices     Peripheral Intravenous Line            Peripheral IV Right;Ventral (anterior) Hand -- days    Peripheral IV 08/31/21 Right Wrist 2 days    Peripheral IV 09/02/21 Left Antecubital <1 day          Drain            External Urinary Catheter Medium 1 day    NG/OG/Enteral Tube Nasogastric 14 Fr Left nare <1 day          Airway            ETT  Oral;Cuffed 8 mm <1 day              Can any invasive devices be discontinued today? No  ---------------------------------------------------------------------------------------  OBJECTIVE    Vitals   Vitals:    21 0333 21 0400 21 0500 21 0600   BP: 144/68 151/71 166/81    BP Location:  Right arm     Pulse: (!) 52 (!) 52 60    Resp: 15 16 (!) 23    Temp:       TempSrc:       SpO2: 95% 94% 96%    Weight:    128 kg (281 lb 8 oz)   Height:         Temp (24hrs), Av 2 °F (36 8 °C), Min:97 8 °F (36 6 °C), Max:98 7 °F (37 1 °C)  Current: Temperature: 98 2 °F (36 8 °C)    Invasive/non-invasive ventilation settings   Respiratory    Lab Data (Last 4 hours)    None         O2/Vent Data (Last 4 hours)    None                Physical Exam  Constitutional:       Appearance: He is ill-appearing  He is not toxic-appearing or diaphoretic  Eyes:      Pupils: Pupils are equal, round, and reactive to light  Cardiovascular:      Rate and Rhythm: Normal rate and regular rhythm  Pulses: Normal pulses  Pulmonary:      Effort: No respiratory distress  Breath sounds: No wheezing, rhonchi or rales  Comments: Left decreased basilar breath sounds  Abdominal:      General: There is no distension  Palpations: Abdomen is soft  Tenderness: There is no abdominal tenderness  There is no guarding or rebound  Comments: obese   Musculoskeletal:      Right lower leg: No edema  Left lower leg: No edema  Skin:     General: Skin is warm and dry  Neurological:      Mental Status: He is alert     Psychiatric:      Comments: calm         Laboratory and Diagnostics:  Results from last 7 days   Lab Units 21  0455 21  0457 21  1202 21  0627 21  0735   WBC Thousand/uL 13 95* 15 92*  --  10 83* 12 44*   HEMOGLOBIN g/dL 12 7 14 5  --  14 7 14 9   I STAT HEMOGLOBIN g/dl  --   --  14 3  --   --    HEMATOCRIT % 40 3 44 1  --  45 1 45 0   HEMATOCRIT, ISTAT %  --   --  42  --   --    PLATELETS Thousands/uL 224 256  --  273 264   NEUTROS PCT %  --   -- --   --  86*   MONOS PCT %  --   --   --   --  8     Results from last 7 days   Lab Units 09/03/21  0455 09/02/21  0457 09/01/21  1202 09/01/21  1200 09/01/21  0627 08/31/21  0735   SODIUM mmol/L 142 145  --  140 141 139   POTASSIUM mmol/L 3 7 3 9  --  3 8 3 6 4 4   CHLORIDE mmol/L 112* 113*  --  111* 110* 107   CO2 mmol/L 27 25  --  22 24 24   CO2, I-STAT mmol/L  --   --  28  --   --   --    ANION GAP mmol/L 3* 7  --  7 7 8   BUN mg/dL 35* 37*  --  27* 23 19   CREATININE mg/dL 0 89 0 98  --  1 07 0 98 1 06   CALCIUM mg/dL 8 3 8 2*  --  8 9 8 7 8 6   GLUCOSE RANDOM mg/dL 130 136  --  124 142* 116   ALT U/L  --   --   --   --   --  18   AST U/L  --   --   --   --   --  27   ALK PHOS U/L  --   --   --   --   --  144*   ALBUMIN g/dL  --   --   --   --   --  3 4*   TOTAL BILIRUBIN mg/dL  --   --   --   --   --  0 86     Results from last 7 days   Lab Units 09/03/21  0455 09/02/21  0457   MAGNESIUM mg/dL 3 0* 2 8*   PHOSPHORUS mg/dL  --  2 9      Results from last 7 days   Lab Units 09/01/21  1250 09/01/21  1250   INR  1 08  --    PTT seconds  --  30      Results from last 7 days   Lab Units 09/01/21  1200 08/31/21  1904 08/31/21  1655 08/31/21  0735   TROPONIN I ng/mL 0 05* 0 09* 0 12* 0 05*         ABG:  Results from last 7 days   Lab Units 09/02/21  1054   PH ART  7 428   PCO2 ART mm Hg 38 4   PO2 ART mm Hg 110 5   HCO3 ART mmol/L 24 8   BASE EXC ART mmol/L 0 6   ABG SOURCE  Radial, Right     VBG:  Results from last 7 days   Lab Units 09/02/21  1054 09/01/21  0627   PH JAMES   --  7 406*   PCO2 JAMES mm Hg  --  34 2*   PO2 JAMES mm Hg  --  73 4*   HCO3 JAMES mmol/L  --  21 0*   BASE EXC JAMES mmol/L  --  -2 8   ABG SOURCE  Radial, Right  --      Results from last 7 days   Lab Units 09/01/21  1019 08/31/21  1555   PROCALCITONIN ng/ml 0 08 <0 05       Intake and Output  I/O       08/31 0701 - 09/01 0700 09/01 0701 - 09/02 0700    P  O  0 0    I V  (mL/kg)  1002 9 (8 1)    Total Intake(mL/kg) 0 (0) 1002 9 (8 1)    Urine (mL/kg/hr) 250 980 (0 3)    Total Output 250 980    Net -250 +22 9          Unmeasured Urine Occurrence  1 x        Height and Weights   Height: 5' 10" (177 8 cm) (per 6/4/21 encounter)  IBW (Ideal Body Weight): 73 kg  Body mass index is 40 39 kg/m²  Weight (last 2 days)     Date/Time   Weight    09/03/21 0600   128 (281 5)    09/01/21 1431   124 (272 49)    09/01/21 0600   117 (257 9)                Nutrition       Diet Orders   (From admission, onward)             Start     Ordered    09/01/21 1652  Diet NPO  Diet effective now     Question Answer Comment   Diet Type NPO    RD to adjust diet per protocol?  No        09/01/21 1651              Active Medications  Scheduled Meds:  Current Facility-Administered Medications   Medication Dose Route Frequency Provider Last Rate    albuterol  2 5 mg Nebulization Q4H PRN Cat Robles PA-C      aspirin  81 mg Oral Daily Caprice Loi DO      fentanyl citrate (PF)  50 mcg Intravenous Q1H PRN LIYA Ford      heparin (porcine)  5,000 Units Subcutaneous Q8H [address] Capricimani Monsivais DO      hydrALAZINE  10 mg Intravenous Q6H PRN LIYA Ford      insulin lispro  2-12 Units Subcutaneous Q6H [address] Cassie Monsivais DO      ipratropium  0 5 mg Nebulization TID Cat Robles PA-C      Labetalol HCl  10 mg Intravenous Once Cat Robles PA-C      Labetalol HCl  10 mg Intravenous Q4H PRN LIYA Ford      levalbuterol  1 25 mg Nebulization TID Cat Robles PA-C      methylPREDNISolone sodium succinate  40 mg Intravenous Q12H [address] Cat Robles [facility]      multi-electrolyte  100 mL/hr Intravenous Continuous Caprice Loi  mL/hr (09/02/21 2353)    niCARdipine  1-15 mg/hr Intravenous Titrated Joel Hopkins MD Stopped (09/01/21 2212)    pantoprazole  40 mg Intravenous Q24H [address] LIYA Ford      propofol  5-50 mcg/kg/min Intravenous Titrated Sita Roque DO 30 mcg/kg/min (09/03/21 9718)    sodium chloride 50 mL Intravenous Once Cat Robles PA-C       Continuous Infusions:  multi-electrolyte, 100 mL/hr, Last Rate: 100 mL/hr (09/02/21 7905)  niCARdipine, 1-15 mg/hr, Last Rate: Stopped (09/01/21 2212)  propofol, 5-50 mcg/kg/min, Last Rate: 30 mcg/kg/min (09/03/21 2876)      PRN Meds:   albuterol, 2 5 mg, Q4H PRN  fentanyl citrate (PF), 50 mcg, Q1H PRN  hydrALAZINE, 10 mg, Q6H PRN  Labetalol HCl, 10 mg, Q4H PRN        Allergies   No Known Allergies  ---------------------------------------------------------------------------------------  Advance Directive and Living Will:      Power of :    POLST:    ---------------------------------------------------------------------------------------      Chava Albert,       Portions of the record may have been created with voice recognition software  Occasional wrong word or "sound a like" substitutions may have occurred due to the inherent limitations of voice recognition software    Read the chart carefully and recognize, using context, where substitutions have occurred

## 2021-09-03 NOTE — CONSULTS
Consultation - Palliative and Supportive Care   Brigitte Antonio 79 y o  male [id number]    Patient Active Problem List   Diagnosis    Osteoarthritis of both hips    Essential hypertension    Mixed hyperlipidemia    Benign colon polyp    Health care maintenance    Benign prostatic hyperplasia with post-void dribbling    Acute cystitis without hematuria    Hypocalcemia    Alkaline phosphatase elevation    Shortness of breath    Fall    Elevated troponin    Tracheomalacia    Diarrhea    Sepsis (HCC)    COPD (chronic obstructive pulmonary disease) (HCC)    Left-sided weakness    Thromboembolic stroke (HCC)    Syncope    Acute respiratory failure (HCC)     Active issues specifically addressed today include:   Acute respiratory failure  COPD  CVA with left sided weakness    Plan:  1  Symptom management  Per critical care team    2  Goals  -  Patient's daughter Vincent Haque, medical decision maker, states The patietn would not want tracheostomy or PEG if it would be for the rest of his life  -  She may be agreeable if the odds are likely+   that it would be temporary     -       Code Status: Full Code - Level 1   Decisional apparatus:  Patient is not competent on my exam today  If competence is lost, patient's substitute decision maker would default to daughter by [place]  Advance Directive / Living Will / POLST:  Not on file    I have reviewed the patient's controlled substance dispensing history in the Prescription Drug Monitoring Program in compliance with the [facility] regulations before prescribing any controlled substances  We appreciate the invitation to be involved in this patient's care  We will continue to follow  Please do not hesitate to reach our on call provider through our clinic answering service at  should you have acute symptom control concerns      LIYA Fitzgerald  Palliative and Supportive Care  Clinic/Answering Service: [phone]  You can find me on [employer]! IDENTIFICATION:  Inpatient consult to Palliative Care  Consult performed by: LIYA Palomino  Consult ordered by: Arlene Moon DO        Physician Requesting Consult: Obed Mendoza MD  Reason for Consult / Principal Problem: goals of care  Hx and PE limited by: patient unable to participate    HISTORY OF PRESENT ILLNESS:       Roxanne Hernandez is a 79 y o  male a past medical history significant for COPD, hypertension, hyperlipidemia, BPH who presented on August 31st after a witnessed fall at an independent living facility  The patient has a history of shortness of breath x1 month with unknown underlying cause  Upon arrival to the emergency department he was noted to be confused, and diaphoretic with labored breathing  He had an elevated troponin and was being evaluated by  the pulmonary and cardiology team   On September 1st the patient was a rapid response for acute change in neurologic status  He was noted to have an acute left-sided weakness, right gaze preference, left facial droop and slurred speech  A stroke alert was called and the patient received tPA  CTA revealed a basal gangliar stroke  With a change in mental status and difficulty managing secretions the patient was intubated on 09/02 and has remained on mechanical ventilation  The patient continues to have a large amount of thick secretions and was noted on CT to have tracheomalacia  The patient's daughter states that her dad "made his own rules" he does not like to be told what to do  She states that she knows he would not want a feeding tube  She states he has not bee active for the past several years secondary to hip pain secondary to osteoarthritis  Review of Systems   Unable to perform ROS: intubated       History reviewed  No pertinent past medical history    Past Surgical History:   Procedure Laterality Date    KNEE SURGERY       Social History     Socioeconomic History    Marital status: /Civil Union     Spouse name: Not on file    Number of children: Not on file    Years of education: Not on file    Highest education level: Not on file   Occupational History    Not on file   Tobacco Use    Smoking status: Former Smoker     Packs/day: 1 00     Years: 10 00     Pack years: 10 00    Smokeless tobacco: Never Used   Vaping Use    Vaping Use: Never used   Substance and Sexual Activity    Alcohol use: Not Currently     Comment: Denies alcohol use for more than a decade    Drug use: No    Sexual activity: Not Currently   Other Topics Concern    Not on file   Social History Narrative     per [employer]     Social Determinants of Health     Financial Resource Strain:     Difficulty of Paying Living Expenses:    Food Insecurity:     Worried About Running Out of Food in the Last Year:     [address] in the Last Year:    Transportation Needs:     Lack of Transportation (Medical):      Lack of Transportation (Non-Medical):    Physical Activity:     Days of Exercise per Week:     Minutes of Exercise per Session:    Stress:     Feeling of Stress :    Social Connections:     Frequency of Communication with Friends and Family:     Frequency of Social Gatherings with Friends and Family:     Attends [employer] Services:     Active Member of Clubs or Organizations:     Attends Club or Organization Meetings:     Marital Status:    Intimate Partner Violence:     Fear of Current or Ex-Partner:     Emotionally Abused:     Physically Abused:     Sexually Abused:      Family History   Problem Relation Age of Onset    Pneumonia Father        MEDICATIONS / ALLERGIES:    current meds:   Current Facility-Administered Medications   Medication Dose Route Frequency    albuterol inhalation solution 2 5 mg  2 5 mg Nebulization Q4H PRN    amLODIPine (NORVASC) tablet 10 mg  10 mg Oral Daily    aspirin chewable tablet 81 mg  81 mg Oral Daily    atorvastatin (LIPITOR) tablet 40 mg  40 mg Oral Daily With Dinner    dexmedetomidine (PRECEDEX) 400 mcg in sodium chloride 0 9% 100 ml IVPB  0 1-0 7 mcg/kg/hr Intravenous Titrated    fentanyl citrate (PF) 100 MCG/2ML 50 mcg  50 mcg Intravenous Q1H PRN    heparin (porcine) subcutaneous injection 5,000 Units  5,000 Units Subcutaneous Q8H [address]    hydrALAZINE (APRESOLINE) injection 10 mg  10 mg Intravenous Q6H PRN    insulin lispro (HumaLOG) 100 units/mL subcutaneous injection 2-12 Units  2-12 Units Subcutaneous Q6H [address]    Labetalol HCl (NORMODYNE) injection 10 mg  10 mg Intravenous Q4H PRN    multi-electrolyte (PLASMALYTE-A/ISOLYTE-S PH 7 4) IV solution  100 mL/hr Intravenous Continuous    polyethylene glycol (MIRALAX) packet 17 g  17 g Oral Daily    senna oral syrup 8 8 mg  8 8 mg Oral HS       No Known Allergies    OBJECTIVE:    Physical Exam  Physical Exam  Vitals and nursing note reviewed  Constitutional:       Appearance: He is obese  He is ill-appearing  Interventions: He is sedated, intubated and restrained  HENT:      Head: Normocephalic and atraumatic  Cardiovascular:      Rate and Rhythm: Normal rate  Pulmonary:      Effort: He is intubated  Breath sounds: Decreased air movement present  Rhonchi present  Abdominal:      General: Abdomen is flat  Bowel sounds are normal       Palpations: Abdomen is soft  Musculoskeletal:      Comments: No spontaneous movements   Skin:     General: Skin is warm and dry  Coloration: Skin is pale  Neurological:      General: No focal deficit present  Mental Status: He is lethargic  Comments: Sedated,   Psychiatric:         Attention and Perception: Attention normal          Cognition and Memory: Cognition is impaired  Memory is impaired           Lab Results:   CBC:   Lab Results   Component Value Date    WBC 13 95 (H) 09/03/2021    HGB 12 7 09/03/2021    HCT 40 3 09/03/2021    MCV 94 09/03/2021     09/03/2021    MCH 29 5 09/03/2021    MCHC 31 5 09/03/2021 RDW 15 1 09/03/2021    MPV 10 5 09/03/2021    NRBC 0 09/03/2021   , CMP:   Lab Results   Component Value Date    SODIUM 142 09/03/2021    K 3 7 09/03/2021     (H) 09/03/2021    CO2 27 09/03/2021    BUN 35 (H) 09/03/2021    CREATININE 0 89 09/03/2021    CALCIUM 8 3 09/03/2021    EGFR 81 09/03/2021   , PT/PTT:No results found for: PT, PTT      Counseling / Coordination of Care    Total floor / unit time spent today 60+  minutes  Greater than 50% of total time was spent with the patient and / or family counseling and / or coordination of care  A description of the counseling / coordination of care: review of chart, collaboration with critical care team, offer information, rev iewed goals of care, conversations with daughter

## 2021-09-03 NOTE — RESPIRATORY THERAPY NOTE
resp care      09/03/21 0951   Respiratory Assessment   Resp Comments Pt changed to psv 6 wean   Will cont to monitor for extubation

## 2021-09-03 NOTE — PHYSICAL THERAPY NOTE
Physical Therapy Cancellation Note    PT orders received chart review completed  Pt is currently intubated/sedated and not appropriate to participate in skilled PT at this time  PT will follow and eval as medically appropriate       09/03/21 1100   PT Last Visit   PT Visit Date 09/03/21   Note Type   Cancel Reasons Intubated/sedated     Kyung Billingsley, MATTY

## 2021-09-04 PROBLEM — I63.511 ACUTE ISCHEMIC RIGHT MCA STROKE (HCC): Status: ACTIVE | Noted: 2021-01-01

## 2021-09-04 NOTE — PROGRESS NOTES
Progress note - Palliative and Supportive Care   Roxanne Hernandez 79 y o  male [id number]    Patient Active Problem List   Diagnosis    Osteoarthritis of both hips    Essential hypertension    Mixed hyperlipidemia    Benign colon polyp    Health care maintenance    Benign prostatic hyperplasia with post-void dribbling    Acute cystitis without hematuria    Hypocalcemia    Alkaline phosphatase elevation    Shortness of breath    Fall    Elevated troponin    Tracheomalacia    Diarrhea    Sepsis (HCC)    COPD (chronic obstructive pulmonary disease) (HCC)    Left-sided weakness    Thromboembolic stroke (HCC)    Syncope    Acute respiratory failure (HCC)     Active issues specifically addressed today include:   - R basal ganglia/M2 ischemic stroke s/p tPA   - encephalopathy   - acute hypoxemic respiratory failure   - presumed COPD, 50+ pk-yr h/o tobacco use   - s/p intubation []   - goals of care support    Plan:  #symptom management   - per primary team    #goals of care  PALLIATIVE AND SUPPORTIVE CARE FAMILY CONFERENCE:    Time of Meetin:20    Participants:  Family: Freda Garcia [[email or web address]]  CCM: Dr Alexa Guzman, Dr Stephanie Trent [resident], Dr Nevaeh Powers [resident]  [facility]: Dr [facility]    Patient Participation: No    Meeting Location: ICU Conference Room    Advanced Directive of POLST available: no    A family meeting was held for medical updates, advanced care planning  This meeting was necessary for determine the appropriate course of treatment  Topics of Discussion:   - introduction of care team and family   - Dr Alexa Guzman outlined patient's medical course to date, including R M2 stroke, acute respiratory failure requiring intubation, presumed COPD, and CT findings suspicious for tracheomalacia  Focus on current plan at this time is to medically support to extubate   Also discussed advanced plan if respiratory deterioration s/p extubation with various options discuss: mainly focusing on BiPAP -> re-intubation -> most likely tracheostomy v comfort-oriented cares  Also discussion diagnosis of tracheomalacia is not via CT scan, but laryngoscopy [which was also discussed]  Alejandro Wheeler states that the patient would not view LUE disability and LLE weakness as a poor QOL  Given that, if cardiac arrest or respiratory arrest were to occur, full resuscitative support with plan to remain Level 1  Again, this needs to be confirmed with patient, but daughter feels that if he were unable to make his own decisions, she would maintain Level 1 status at this time  If further deterioration in medical trajectory were to occur, further discussion regarding QOL at that time would be appreciated  - overall, Joel Griffiths states that the patient has previously, clearly stated that he would not wish to be maintained indefinitely on artifical supports [including mechanical ventilation, feeding tubes]  Clearly stated by daughter, if patient were to require a tracheostomy and be ventilator-dependent, that would not be a QOL acceptable to patient  If tracheostomy did not lead to vent-dependence, that may be acceptable and would require further discussion at that time if indicated  Alejandro Wheeler states that the patient clearly stated that if he were to require repeat intubation, he would agree  - currently patient with marginal decisional competency in the setting of precedex infusion and limitations in assessment 2/2 intubation + hearing difficulty; will re-assess  Other Topics Discussed Prior to Family Meeting [11:00-11:20]   - patient's s/o Lynnette Carlson currently [facility] [place] [facility] with patient, current diagnosis of dementia  Previously  and then , never re-  Mother of patient's two adult children     - two adult children [Nichole + Dot Dry is not actively involved in care decisions of patient, in the setting of EtOH abuse and multiple shared life tragedies    - Joel Griffiths states that a Will and POA documentation previously completed, unclear if medical directives are addressed, will confirm and notify team    - two siblings recently  within the last year, 1 sister alive, in contact with Keny Carpenter  Morteza Bah is employed at [employer]:   - continue full supportive care, including support for extubation   - maintain Level 1 code status   - if respiratory deterioration s/p extubation, wish to re-intubate, further discussion regarding potential for trach at that time   - if patient with full decisional competency, clarify specific, clear goals of care with Keny Carpenter in presence   - obtain advanced care documentation to clarify if medical directives outlined   - will continue to follow and support    Time Involved in Meetin minutes, beginning at approximately 11:20 and ending at approximately 12:20  Plus additional time prior to meeting 20 minutes [11:00-11:20]  Overall 80 minutes spent with family in support  #psychosocial support   - emotional support provided   - Marmarlon Dk [phone]      We appreciate the opportunity to participate in this patient's care  We will continue to follow  Please do not hesitate to contact our on-call provider through our clinic answering service at [phone] should you have acute symptom control concerns  Code Status: Full Code - Level 1   Decisional apparatus:  Patient is not competent on my exam today  If competence is lost, patient's substitute decision maker would default to adult children by [place]     Advance Directive / Living Will / POLST:  Not on File, awaiting confirmation of prior document completed    Interval history:   - NAEO   - remains intubated with tentative plan to extubate if safe/appropriate   - family meeting today [see outline above]   - patient follows simple commands, no LUE strength, wiggles b/l toes    MEDICATIONS / ALLERGIES:     current meds:   Current Facility-Administered Medications   Medication Dose Route Frequency    albuterol inhalation solution 2 5 mg  2 5 mg Nebulization Q4H PRN    amLODIPine (NORVASC) tablet 10 mg  10 mg Oral Daily    aspirin chewable tablet 81 mg  81 mg Oral Daily    atorvastatin (LIPITOR) tablet 40 mg  40 mg Per NG Tube Daily With Dinner    dexmedetomidine (PRECEDEX) 400 mcg in sodium chloride 0 9% 100 ml IVPB  0 1-0 7 mcg/kg/hr Intravenous Titrated    fentanyl citrate (PF) 100 MCG/2ML 50 mcg  50 mcg Intravenous Q1H PRN    heparin (porcine) subcutaneous injection 5,000 Units  5,000 Units Subcutaneous Q8H [facility]    hydrALAZINE (APRESOLINE) injection 10 mg  10 mg Intravenous Q6H PRN    insulin lispro (HumaLOG) 100 units/mL subcutaneous injection 2-12 Units  2-12 Units Subcutaneous Q6H [facility]    Labetalol HCl (NORMODYNE) injection 10 mg  10 mg Intravenous Q4H PRN    multi-electrolyte (PLASMALYTE-A/ISOLYTE-S PH 7 4) IV solution  100 mL/hr Intravenous Continuous    pantoprazole (PROTONIX) injection 40 mg  40 mg Intravenous Q24H [facility]    polyethylene glycol (MIRALAX) packet 17 g  17 g Oral Daily    senna oral syrup 8 8 mg  8 8 mg Oral HS       No Known Allergies    OBJECTIVE:    Physical Exam  Physical Exam  Constitutional:       Appearance: He is not diaphoretic  Interventions: He is intubated  Comments: Chronically ill appearing in NAD  Non-toxic appearing   HENT:      Head: Normocephalic and atraumatic  Right Ear: External ear normal       Left Ear: External ear normal    Eyes:      Comments: Eyes remained closed throughout examination   Cardiovascular:      Rate and Rhythm: Normal rate  Pulmonary:      Effort: No tachypnea, accessory muscle usage or respiratory distress  He is intubated  Abdominal:      General: Bowel sounds are decreased  Palpations: Abdomen is soft  Neurological:      Comments:  Follows simple commands (R thumbs up, wiggle toes b/l feet)  LUE 0/5 strength   Psychiatric:      Comments: Unable to assess         Lab Results:   I have personally reviewed pertinent labs  , CBC:   Lab Results   Component Value Date    WBC 10 17 (H) 09/04/2021    HGB 13 2 09/04/2021    HCT 41 5 09/04/2021    MCV 94 09/04/2021     09/04/2021    MCH 29 9 09/04/2021    MCHC 31 8 09/04/2021    RDW 15 1 09/04/2021    MPV 10 3 09/04/2021   , CMP:   Lab Results   Component Value Date    SODIUM 144 09/04/2021    K 4 0 09/04/2021     (H) 09/04/2021    CO2 27 09/04/2021    BUN 33 (H) 09/04/2021    CREATININE 0 74 09/04/2021    CALCIUM 7 6 (L) 09/04/2021    EGFR 88 09/04/2021   , PT/PTT:No results found for: PT, PTT  Imaging Studies: Reviewed  EKG, Pathology, and Other Studies: Reviewed    Counseling / Coordination of Care    Total floor / unit time spent today 80 minutes  Greater than 50% of total time was spent with the patient and / or family counseling and / or coordination of care  A description of the counseling / coordination of care: chart review, symptoms assessment, emotional support  Family meeting [11:00-12:20] see above outline   Coordinated plan of care with primary team

## 2021-09-04 NOTE — PROGRESS NOTES
Progress Note - Neurology   Slim Schulte 79 y o  male MRN: [id number]  Unit/Bed#: ICU 04 Encounter: [id number]      Assessment/Plan     Left-sided weakness  Assessment & Plan  76-year-old male with HTN, HLD, tobacco abuse, COPD, BPH who presented to [place] ED on 08/31/2021 following a fall at home  Stroke alert called on 09/01/2021 for new onset left upper and lower extremity weakness  NIHSS 12  LUE/LLE and left facial weakness, and severely dysarthric with a right gaze preference  CT head unremarkable for acute intracranial abnormalities  CTA head neck demonstrated significant stenosis of right M1, M2, and distal M3, and right A2  Radiologist noted right M2 occlusion  Patient felt not to be an IR candidate per neurosurgery but was given IV tPA  · Repeat CT head: stable with no acute hemorrhage  · Echocardiogram: LVEF 55%, left atrium mildly dilated  Mild to moderate MAC  · MRI Brain demonstrated rather sizable acute infarct in the right basal ganglia and corona radiata, chronic left lacunar infarcts, and small chronic microhemorrhages scattered throughout the hemispheres, possibly secondary to chronic hypertension  Concern for possible CAA  · , A1c 5 8  Stroke etiology felt to be proximal embolic given size of infarct, versus small vessel  Plan:  - recommend CRYSTAL  If unrevealing, would recommend loop recorder implantation  - telemetry monitoring   - goal normotension, euglycemia  - continue ASA 81 mg daily  - continue atorvastatin 40 mg daily  - PT/OT/SL  - Frequent neuro checks, STAT CT Head for acute changes, and notify Neurology    Shortness of breath  Assessment & Plan  - Patient remains intubated on Precedex  - Ventilator management as per Critical Care team   Possible extubation today      Essential hypertension  Assessment & Plan  - goal normotension  - Management per ICU team         Slim Schulte will need follow up in in 6 weeks with neurovascular attending or advance practitioner  He will not require outpatient neurological testing  Subjective:   Patient remains intubated in the ICU, on low-dose Precedex  No significant improvement clinically today as compared to yesterday  ROS:  Unable to obtain as patient intubated  Vitals: Blood pressure 153/68, pulse 66, temperature 98 4 °F (36 9 °C), temperature source Oral, resp  rate (!) 25, height 5' 10" (1 778 m), weight 130 kg (286 lb), SpO2 97 %  ,Body mass index is 41 04 kg/m²  Physical Exam:  General:  Patient is well-developed, obese BMI 41 04, and in no acute distress  HEENT:  Head normocephalic  Eyes anicteric  Intubated  OG tube  Cardiovascular:  With regular rhythm  Lungs: On ventilator  Extremities:  With no significant edema  Skin: No rashes  Neurologic:  Patient is alert, abulic  Able to follow simple midline and appendicular commands, but requires multiple prompts  Gait deferred as patient intubated  Cranial Nerves:   II:  Able to finger count bilaterally  Pupils equal, round, reactive to light with normal accomodation  Cannot visualize optic fundi  III,IV,VI:  Slightly restricted rightward extraocular movement  No nystagmus  VII:  Unable to assess facial symmetry with ET collar in place  Left upper extremity flaccid tone  Does not offer much movement in the plane of bed today  Right upper extremity is antigravity with some resistance  Unable to elevate left lower extremity antigravity  Can briefly elevate right lower extremity antigravity, with no resistance  Nods head yes when asked if he could appreciate light touch to right upper and right lower extremities  Does not grimace or withdraw to noxious stim of left upper or left lower extremities  Some left shoulder shrug, however  Toe responses are downgoing on right and possibly upgoing on left (asymmetric)      Lab, Imaging and other studies:   CBC:   Results from last 7 days   Lab Units 09/04/21  0435 09/03/21  5494 09/02/21  0457   WBC Thousand/uL 10 17* 13 95* 15 92*   RBC Million/uL 4 41 4 30 4 78   HEMOGLOBIN g/dL 13 2 12 7 14 5   HEMATOCRIT % 41 5 40 3 44 1   MCV fL 94 94 92   PLATELETS Thousands/uL 183 224 256   , BMP/CMP:   Results from last 7 days   Lab Units 09/04/21  0435 09/03/21  0455 09/02/21  0457 09/01/21  1202 08/31/21  0735   SODIUM mmol/L 144 142 145  --  139   POTASSIUM mmol/L 4 0 3 7 3 9  --  4 4   CHLORIDE mmol/L 114* 112* 113*  --  107   CO2 mmol/L 27 27 25  --  24   CO2, I-STAT mmol/L  --   --   --  28  --    BUN mg/dL 33* 35* 37*  --  19   CREATININE mg/dL 0 74 0 89 0 98  --  1 06   GLUCOSE, ISTAT mg/dl  --   --   --  131  --    CALCIUM mg/dL 7 6* 8 3 8 2*  --  8 6   AST U/L 25  --   --   --  27   ALT U/L 20  --   --   --  18   ALK PHOS U/L 88  --   --   --  144*   EGFR ml/min/1 73sq m 88 81 73  --  66   , HgBA1C:   Results from last 7 days   Lab Units 08/31/21  0735   HEMOGLOBIN A1C % 5 8*   , TSH:   Results from last 7 days   Lab Units 08/31/21  0735   TSH 3RD GENERATON uIU/mL 1 810   , Coagulation:   Results from last 7 days   Lab Units 09/01/21  1250   INR  1 08   , Lipid Profile:   Results from last 7 days   Lab Units 08/31/21  0735   HDL mg/dL 52   LDL CALC mg/dL 151*   TRIGLYCERIDES mg/dL 80     VTE Prophylaxis: Heparin    Counseling / Coordination of Care  Total Critical Care time spent 25 minutes excluding procedures, teaching and family updates

## 2021-09-04 NOTE — PLAN OF CARE
Problem: Potential for Falls  Goal: Patient will remain free of falls  Description: INTERVENTIONS:  - Educate patient/family on patient safety including physical limitations  - Instruct patient to call for assistance with activity   - Consult OT/PT to assist with strengthening/mobility   - Keep Call bell within reach  - Keep bed low and locked with side rails adjusted as appropriate  - Keep care items and personal belongings within reach  - Initiate and maintain comfort rounds  - Make Fall Risk Sign visible to staff  - Offer Toileting every  Hours, in advance of need  - Initiate/Maintain alarm  - Obtain necessary fall risk management equipment:   - Apply yellow socks and bracelet for high fall risk patients  - Consider moving patient to room near nurses station  Outcome: Progressing     Problem: CARDIOVASCULAR - ADULT  Goal: Maintains optimal cardiac output and hemodynamic stability  Description: INTERVENTIONS:  - Monitor I/O, vital signs and rhythm  - Monitor for S/S and trends of decreased cardiac output  - Administer and titrate ordered vasoactive medications to optimize hemodynamic stability  - Assess quality of pulses, skin color and temperature  - Assess for signs of decreased coronary artery perfusion  - Instruct patient to report change in severity of symptoms  Outcome: Progressing  Goal: Absence of cardiac dysrhythmias or at baseline rhythm  Description: INTERVENTIONS:  - Continuous cardiac monitoring, vital signs, obtain 12 lead EKG if ordered  - Administer antiarrhythmic and heart rate control medications as ordered  - Monitor electrolytes and administer replacement therapy as ordered  Outcome: Progressing     Problem: RESPIRATORY - ADULT  Goal: Achieves optimal ventilation and oxygenation  Description: INTERVENTIONS:  - Assess for changes in respiratory status  - Assess for changes in mentation and behavior  - Position to facilitate oxygenation and minimize respiratory effort  - Oxygen administered by appropriate delivery if ordered  - Initiate smoking cessation education as indicated  - Encourage broncho-pulmonary hygiene including cough, deep breathe, Incentive Spirometry  - Assess the need for suctioning and aspirate as needed  - Assess and instruct to report SOB or any respiratory difficulty  - Respiratory Therapy support as indicated  Outcome: Progressing     Problem: METABOLIC, FLUID AND ELECTROLYTES - ADULT  Goal: Electrolytes maintained within normal limits  Description: INTERVENTIONS:  - Monitor labs and assess patient for signs and symptoms of electrolyte imbalances  - Administer electrolyte replacement as ordered  - Monitor response to electrolyte replacements, including repeat lab results as appropriate  - Instruct patient on fluid and nutrition as appropriate  Outcome: Progressing  Goal: Fluid balance maintained  Description: INTERVENTIONS:  - Monitor labs   - Monitor I/O and WT  - Instruct patient on fluid and nutrition as appropriate  - Assess for signs & symptoms of volume excess or deficit  Outcome: Progressing  Goal: Glucose maintained within target range  Description: INTERVENTIONS:  - Monitor Blood Glucose as ordered  - Assess for signs and symptoms of hyperglycemia and hypoglycemia  - Administer ordered medications to maintain glucose within target range  - Assess nutritional intake and initiate nutrition service referral as needed  Outcome: Progressing     Problem: Prexisting or High Potential for Compromised Skin Integrity  Goal: Skin integrity is maintained or improved  Description: INTERVENTIONS:  - Identify patients at risk for skin breakdown  - Assess and monitor skin integrity  - Assess and monitor nutrition and hydration status  - Monitor labs   - Assess for incontinence   - Turn and reposition patient  - Assist with mobility/ambulation  - Relieve pressure over bony prominences  - Avoid friction and shearing  - Provide appropriate hygiene as needed including keeping skin clean and dry  - Evaluate need for skin moisturizer/barrier cream  - Collaborate with interdisciplinary team   - Patient/family teaching  - Consider wound care consult   Outcome: Progressing     Problem: MOBILITY - ADULT  Goal: Maintain or return to baseline ADL function  Description: INTERVENTIONS:  -  Assess patient's ability to carry out ADLs; assess patient's baseline for ADL function and identify physical deficits which impact ability to perform ADLs (bathing, care of mouth/teeth, toileting, grooming, dressing, etc )  - Assess/evaluate cause of self-care deficits   - Assess range of motion  - Assess patient's mobility; develop plan if impaired  - Assess patient's need for assistive devices and provide as appropriate  - Encourage maximum independence but intervene and supervise when necessary  - Involve family in performance of ADLs  - Assess for home care needs following discharge   - Consider OT consult to assist with ADL evaluation and planning for discharge  - Provide patient education as appropriate  Outcome: Progressing  Goal: Maintains/Returns to pre admission functional level  Description: INTERVENTIONS:  - Perform BMAT or MOVE assessment daily    - Set and communicate daily mobility goal to care team and patient/family/caregiver  - Collaborate with rehabilitation services on mobility goals if consulted  - Perform Range of Motion  times a day  - Reposition patient every  hours  - Dangle patient  times a day  - Stand patient  times a day  - Ambulate patient  times a day  - Out of bed to chair  times a day   - Out of bed for meals times a day  - Out of bed for toileting  - Record patient progress and toleration of activity level   Outcome: Progressing     Problem: Nutrition/Hydration-ADULT  Goal: Nutrient/Hydration intake appropriate for improving, restoring or maintaining nutritional needs  Description: Monitor and assess patient's nutrition/hydration status for malnutrition   Collaborate with interdisciplinary team and initiate plan and interventions as ordered  Monitor patient's weight and dietary intake as ordered or per policy  Utilize nutrition screening tool and intervene as necessary  Determine patient's food preferences and provide high-protein, high-caloric foods as appropriate       INTERVENTIONS:  - Monitor oral intake, urinary output, labs, and treatment plans  - Assess nutrition and hydration status and recommend course of action  - Evaluate amount of meals eaten  - Assist patient with eating if necessary   - Allow adequate time for meals  - Recommend/ encourage appropriate diets, oral nutritional supplements, and vitamin/mineral supplements  - Order, calculate, and assess calorie counts as needed  - Recommend, monitor, and adjust tube feedings and TPN/PPN based on assessed needs  - Assess need for intravenous fluids  - Provide specific nutrition/hydration education as appropriate  - Include patient/family/caregiver in decisions related to nutrition  Outcome: Progressing     Problem: SAFETY,RESTRAINT: NV/NON-SELF DESTRUCTIVE BEHAVIOR  Goal: Remains free of harm/injury (restraint for non violent/non self-detsructive behavior)  Description: INTERVENTIONS:  - Instruct patient/family regarding restraint use   - Assess and monitor physiologic and psychological status   - Provide interventions and comfort measures to meet assessed patient needs   - Identify and implement measures to help patient regain control  - Assess readiness for release of restraint   Outcome: Progressing  Goal: Returns to optimal restraint-free functioning  Description: INTERVENTIONS:  - Assess the patient's behavior and symptoms that indicate continued need for restraint  - Identify and implement measures to help patient regain control  - Assess readiness for release of restraint   Outcome: Progressing     Problem: COPING  Goal: Pt/Family able to verbalize concerns and demonstrate effective coping strategies  Description: INTERVENTIONS:  - Assist patient/family to identify coping skills, available support systems and cultural and spiritual values  - Provide emotional support, including active listening and acknowledgement of concerns of patient and caregivers  - Reduce environmental stimuli, as able  - Provide patient education  - Assess for spiritual pain/suffering and initiate spiritual care, including notification of Pastoral Care or pardeep based community as needed  - Assess effectiveness of coping strategies  Outcome: Progressing  Goal: Will report anxiety at manageable levels  Description: INTERVENTIONS:  - Administer medication as ordered  - Teach and encourage coping skills  - Provide emotional support  - Assess patient/family for anxiety and ability to cope  Outcome: Progressing

## 2021-09-04 NOTE — PLAN OF CARE
Problem: Potential for Falls  Goal: Patient will remain free of falls  Description: INTERVENTIONS:  - Educate patient/family on patient safety including physical limitations  - Instruct patient to call for assistance with activity   - Consult OT/PT to assist with strengthening/mobility   - Keep Call bell within reach  - Keep bed low and locked with side rails adjusted as appropriate  - Keep care items and personal belongings within reach  - Initiate and maintain comfort rounds  - Make Fall Risk Sign visible to staff  - Initiate/Maintain bed alarm  - Obtain necessary fall risk management equipment: bed alarm  - Apply yellow socks and bracelet for high fall risk patients  - Consider moving patient to room near nurses station  Outcome: Progressing     Problem: CARDIOVASCULAR - ADULT  Goal: Maintains optimal cardiac output and hemodynamic stability  Description: INTERVENTIONS:  - Monitor I/O, vital signs and rhythm  - Monitor for S/S and trends of decreased cardiac output  - Administer and titrate ordered vasoactive medications to optimize hemodynamic stability  - Assess quality of pulses, skin color and temperature  - Assess for signs of decreased coronary artery perfusion  - Instruct patient to report change in severity of symptoms  Outcome: Progressing  Goal: Absence of cardiac dysrhythmias or at baseline rhythm  Description: INTERVENTIONS:  - Continuous cardiac monitoring, vital signs, obtain 12 lead EKG if ordered  - Administer antiarrhythmic and heart rate control medications as ordered  - Monitor electrolytes and administer replacement therapy as ordered  Outcome: Progressing     Problem: RESPIRATORY - ADULT  Goal: Achieves optimal ventilation and oxygenation  Description: INTERVENTIONS:  - Assess for changes in respiratory status  - Assess for changes in mentation and behavior  - Position to facilitate oxygenation and minimize respiratory effort  - Oxygen administered by appropriate delivery if ordered  - Initiate smoking cessation education as indicated  - Encourage broncho-pulmonary hygiene including cough, deep breathe, Incentive Spirometry  - Assess the need for suctioning and aspirate as needed  - Assess and instruct to report SOB or any respiratory difficulty  - Respiratory Therapy support as indicated  Outcome: Progressing     Problem: METABOLIC, FLUID AND ELECTROLYTES - ADULT  Goal: Electrolytes maintained within normal limits  Description: INTERVENTIONS:  - Monitor labs and assess patient for signs and symptoms of electrolyte imbalances  - Administer electrolyte replacement as ordered  - Monitor response to electrolyte replacements, including repeat lab results as appropriate  - Instruct patient on fluid and nutrition as appropriate  Outcome: Progressing  Goal: Fluid balance maintained  Description: INTERVENTIONS:  - Monitor labs   - Monitor I/O and WT  - Instruct patient on fluid and nutrition as appropriate  - Assess for signs & symptoms of volume excess or deficit  Outcome: Progressing  Goal: Glucose maintained within target range  Description: INTERVENTIONS:  - Monitor Blood Glucose as ordered  - Assess for signs and symptoms of hyperglycemia and hypoglycemia  - Administer ordered medications to maintain glucose within target range  - Assess nutritional intake and initiate nutrition service referral as needed  Outcome: Progressing     Problem: Prexisting or High Potential for Compromised Skin Integrity  Goal: Skin integrity is maintained or improved  Description: INTERVENTIONS:  - Identify patients at risk for skin breakdown  - Assess and monitor skin integrity  - Assess and monitor nutrition and hydration status  - Monitor labs   - Assess for incontinence   - Turn and reposition patient  - Assist with mobility/ambulation  - Relieve pressure over bony prominences  - Avoid friction and shearing  - Provide appropriate hygiene as needed including keeping skin clean and dry  - Evaluate need for skin moisturizer/barrier cream  - Collaborate with interdisciplinary team   - Patient/family teaching  - Consider wound care consult   Outcome: Progressing     Problem: MOBILITY - ADULT  Goal: Maintain or return to baseline ADL function  Description: INTERVENTIONS:  -  Assess patient's ability to carry out ADLs; assess patient's baseline for ADL function and identify physical deficits which impact ability to perform ADLs (bathing, care of mouth/teeth, toileting, grooming, dressing, etc )  - Assess/evaluate cause of self-care deficits   - Assess range of motion  - Assess patient's mobility; develop plan if impaired  - Assess patient's need for assistive devices and provide as appropriate  - Encourage maximum independence but intervene and supervise when necessary  - Involve family in performance of ADLs  - Assess for home care needs following discharge   - Consider OT consult to assist with ADL evaluation and planning for discharge  - Provide patient education as appropriate  Outcome: Progressing  Goal: Maintains/Returns to pre admission functional level  Description: INTERVENTIONS:  - Perform BMAT or MOVE assessment daily    - Set and communicate daily mobility goal to care team and patient/family/caregiver  - Collaborate with rehabilitation services on mobility goals if consulted  - Perform Range of Motion 4 times a day  - Reposition patient every 2 hours  - Out of bed for toileting  - Record patient progress and toleration of activity level   Outcome: Progressing     Problem: Nutrition/Hydration-ADULT  Goal: Nutrient/Hydration intake appropriate for improving, restoring or maintaining nutritional needs  Description: Monitor and assess patient's nutrition/hydration status for malnutrition  Collaborate with interdisciplinary team and initiate plan and interventions as ordered  Monitor patient's weight and dietary intake as ordered or per policy  Utilize nutrition screening tool and intervene as necessary   Determine patient's food preferences and provide high-protein, high-caloric foods as appropriate       INTERVENTIONS:  - Monitor oral intake, urinary output, labs, and treatment plans  - Assess nutrition and hydration status and recommend course of action  - Evaluate amount of meals eaten  - Assist patient with eating if necessary   - Allow adequate time for meals  - Recommend/ encourage appropriate diets, oral nutritional supplements, and vitamin/mineral supplements  - Order, calculate, and assess calorie counts as needed  - Recommend, monitor, and adjust tube feedings and TPN/PPN based on assessed needs  - Assess need for intravenous fluids  - Provide specific nutrition/hydration education as appropriate  - Include patient/family/caregiver in decisions related to nutrition  Outcome: Progressing     Problem: SAFETY,RESTRAINT: NV/NON-SELF DESTRUCTIVE BEHAVIOR  Goal: Remains free of harm/injury (restraint for non violent/non self-detsructive behavior)  Description: INTERVENTIONS:  - Instruct patient/family regarding restraint use   - Assess and monitor physiologic and psychological status   - Provide interventions and comfort measures to meet assessed patient needs   - Identify and implement measures to help patient regain control  - Assess readiness for release of restraint   Outcome: Progressing  Goal: Returns to optimal restraint-free functioning  Description: INTERVENTIONS:  - Assess the patient's behavior and symptoms that indicate continued need for restraint  - Identify and implement measures to help patient regain control  - Assess readiness for release of restraint   Outcome: Progressing     Problem: COPING  Goal: Pt/Family able to verbalize concerns and demonstrate effective coping strategies  Description: INTERVENTIONS:  - Assist patient/family to identify coping skills, available support systems and cultural and spiritual values  - Provide emotional support, including active listening and acknowledgement of concerns of patient and caregivers  - Reduce environmental stimuli, as able  - Provide patient education  - Assess for spiritual pain/suffering and initiate spiritual care, including notification of Pastoral Care or pardeep based community as needed  - Assess effectiveness of coping strategies  Outcome: Progressing  Goal: Will report anxiety at manageable levels  Description: INTERVENTIONS:  - Administer medication as ordered  - Teach and encourage coping skills  - Provide emotional support  - Assess patient/family for anxiety and ability to cope  Outcome: Progressing

## 2021-09-04 NOTE — ASSESSMENT & PLAN NOTE
60-year-old male with HTN, HLD, tobacco abuse, COPD, BPH who presented to [place] ED on 08/31/2021 following a fall at home  Stroke alert called on 09/01/2021 for new onset left upper and lower extremity weakness  NIHSS 12  LUE/LLE and left facial weakness, and severely dysarthric with a right gaze preference  CT head unremarkable for acute intracranial abnormalities  CTA head neck demonstrated significant stenosis of right M1, M2, and distal M3, and right A2  Radiologist noted right M2 occlusion  Patient felt not to be an IR candidate per neurosurgery but was given IV tPA  · Repeat CT head: stable with no acute hemorrhage  · Echocardiogram: LVEF 55%, left atrium mildly dilated  Mild to moderate MAC  · MRI Brain demonstrated rather sizable acute infarct in the right basal ganglia and corona radiata, chronic left lacunar infarcts, and small chronic microhemorrhages scattered throughout the hemispheres, possibly secondary to chronic hypertension  Concern for possible CAA  · , A1c 5 8  Stroke etiology felt to be proximal embolic given size of infarct, versus small vessel      Plan:  - Per ICU notes CRYSTAL not recommended with respiratory status and DNR status  - telemetry monitoring   - goal normotension, euglycemia  - continue ASA 81 mg daily  - continue atorvastatin 40 mg daily  - PT/OT/SL  - Frequent neuro checks, STAT CT Head for acute changes, and notify Neurology

## 2021-09-04 NOTE — NURSING NOTE
Patient extubated at [id number], labored, tacypneic, oral suctioning provided, respiratory and Dr Army Rose present at bedside along with daughter Madison Alegria  ALEXA given, new orders noted for lasix and decadron  Precedex increased from 0 1 to 0 3mcg/kg/hr   will continue to monitor

## 2021-09-04 NOTE — PROGRESS NOTES
Daily Progress Note - Critical Care   Slim Schulte 79 y o  male MRN: [id number]  Unit/Bed#: ICU 04 Encounter: [id number]        ----------------------------------------------------------------------------------------  HPI/24hr events:  79 y o  male with HTN, current smoker x 50+ years, HLD, obesity, BPH initially presented to [facility] with acute hypoxic respiratory failure and syncope  Admitted for COPD exacerbation, started on steroids, nebs, and BiPAP  Did have non-MI troponin elevation on admission which has since trended down  Echo done yesterday with some grade I diastolic dysfunction  Patient no acutely volume overloaded on exam or imaging  CT chest with airspace disease, but no acute consolidations  Tracheomalacia also present      On 9/1 a stroke alert was called given acute left sided UE and LE weakness, left facial droop and dysarthria  No prior history of stroke  Stat CTA head showed M2 occlusion of right MCA along with multiple sites of intracranial atherosclerosis along right A2, M1 and M3  Patient initiated on tPA and brought to unit      Deficits: Can answer yes/no questions appropriately and follow commands  Has 1/5 strength on LLE, 0/5 on LUE  Left facial droop  Has lots of thick, loose oral secretions and gurgling cough  Patient intubated 9/3 given impending respiratory failure from fatigue  Will have 24 hour post tPA imaging today after noon  Last 24 hours:     9/3 MRI brain: Acute infarct within the right basal ganglia and corona radiata  No hemorrhagic transformation  Additional chronic microangiopathic change within the brain parenchyma including old lacunar infarcts  Small chronic microhemorrhages are scattered within the brain, possibly secondary to chronic hypertension  No acute hemorrhage  Switched from propofol to precedex yesterday  Now off precedex this AM  He had been doing fine but slightly bradycardic tachyphenic this morning   Restarted precedex 0 1       ---------------------------------------------------------------------------------------  SUBJECTIVE  Patient is laying in bed, intubated  Repositioned by nurses  Does respond to commands  Gave me a thumbs up with R hand but still no movement in LUE  Wiggles both toes  Family meeting today with palliative, critical care, daughter and son-in-law in order to come up with a plan of action for extubation  Review of Systems   Unable to perform ROS: Intubated     -------------------------------------------------------------------------------------------------------------  Assessment and Plan:    Neuro:    Diagnosis: Acute right M2 stroke  o Suspect source ruptured plaque in setting of uncontrolled HTN, present on admission, and elevated lipid panel  o Stroke alert called on floor at 1150 given acute right-sided weakness and dysarthria  o CTA head showed occlusion of right M2, along with atherosclerotic changes in right A2, M1, M3    o Plan: tPA administered at 1238  o Transferred to ICU  o Continue neuro checks per tPA protocol  o 24 hour Post-tPA Non-contrast CT head showed new basal ganglia infarct  - Initiated aspirin 81 mg per neuro recommendations 9/3  o Echo done 8/31 showed evidence of G1 diastolic dysfunction only  o Continue telemetry  o Stat CT head if neuro changes  o PM&R/PT/OT/ST consulted  o 9/3 MRI brain: Acute infarct within the right basal ganglia and corona radiata  No hemorrhagic transformation  Additional chronic microangiopathic change within the brain parenchyma including old lacunar infarcts  Small chronic microhemorrhages are scattered within the brain, possibly secondary to chronic hypertension    No acute hemorrhage   o BP goal <180/105 per neurology  - Patient maintaining goal BP on own while off of cardene gtt  o Hold DVT chemical prophylaxis until cleared by neurology    CV:    Diagnosis: Hypertension  o Likely contributed to stroke by shearing plaque  o BP in Systolic's >200 on admission  o Goal -160  o Norvasc 10mg    Diagnosis: HLD  o Statin once tolerating p o  medications   Diagnosis: grade I diastolic dysfunction  o Plan: maintain euvolemic status  - Lasix 20 mg given 9/4 and d/c IVF    Pulm:   Diagnosis: Impending respiratory failure  o Intubated 9/2 am for respiratory fatigue given increased work of breathing  Patient endorsed not feeling well and feeling tired from breathing so hard  - Started sedation with propofol then switched to precedex 9/3, now on precedex 0 1  - Add fentanyl if necessary to keep comfortable  - SBT to be performed per respiratory  - Palliative consulted for family meeting today in order to discuss plan of action for extubation   · Diagnosis:  o Presumed COPD exacerbation compounded on obesity hypoventilation, likely MIGUEL, and possible bronchitis   o  50+ PPD smoking history  o 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  o Leukocytosis 12 on admission  o Presented initially with SOB, rhonchi and wheezing  o Received solu-medrol 125 mg once  o Plan: continue Solu-Medrol 40 mg IV bid  o Q6hr Xopenex and Atrovent  o Eventual outpatient PFTs given COPD   Diagnosis: MIGUEL  o Plan: intubated  o Consider high-flow if desaturating  o Likely will not tolerate BiPAP given coughing/secretions  - Initiate once more stable    GI:    Diagnosis: none  o Plan: prophylactic PPI given intubation - protonix 40 mg IV    :    Diagnosis: BPH  o Plan: monitor I&O  o Maintain condom cath    F/E/N:    Fluids: discontinue gentle IVF hydration with isolyte 100mL/hr   Electrolytes: k>4, phos >3, mag >2   Nutrition: NG tube, intubated  o Nutrition recommend increasing Jevity 1 2 by 10 ml q 4 hrs as lane to goal rate of 66 ml/hr to provide qd: 1584ml, 1901 kcal,88 gm PRO,268 gm CHO,62 gm Fat,1278 ml Free H2O,1 45 mg CHO/kg/min  Adjust magnesium   Recommend 100 ml Free H2O flush q 4 hrs      Heme/Onc:    Diagnosis: none  o Plan: monitor CBC    Endo:  Diagnosis: T2DM  o A1c 5 8%  o Plan: SSI coverage - adjust based on TF    ID:    Diagnosis: no active issues  o Plan: continue to monitor CBC  o procal negative  o Leukocytosis likely reactive and 2/2 steroid use      MSK/Skin:    Diagnosis: none  o Plan: frequent repositioning  o PM&R following   o PT/OT/speech when able      Disposition: Transfer to Critical Care   Code Status: Level 1 - Full Code  ---------------------------------------------------------------------------------------  ICU CORE MEASURES    Prophylaxis   VTE Pharmacologic Prophylaxis: Heparin  VTE Mechanical Prophylaxis: sequential compression device  Stress Ulcer Prophylaxis: Pantoprazole PO    ABCDE Protocol (if indicated)  Plan to perform spontaneous awakening trial today? No  Plan to perform spontaneous breathing trial today? No  Obvious barriers to extubation? Not applicable    Invasive Devices Review  Invasive Devices     Peripheral Intravenous Line            Peripheral IV Right;Ventral (anterior) Hand -- days    Peripheral IV 21 Right Wrist 4 days    Peripheral IV 21 Left Antecubital 1 day          Drain            External Urinary Catheter Medium 2 days    NG/OG/Enteral Tube Nasogastric 14 Fr Left nare 1 day          Airway            ETT  Oral;Cuffed 8 mm 1 day              Can any invasive devices be discontinued today?  No  ---------------------------------------------------------------------------------------  OBJECTIVE    Vitals   Vitals:    21 0435 21 0500 21 0600 21 0700   BP: 138/81  150/73 144/65   BP Location: Right arm  Right arm Right arm   Pulse: 56 (!) 52 (!) 50 60   Resp: (!) 24 (!) 26 (!) 23 (!) 32   Temp:       TempSrc:       SpO2: 97% 98% 98% 98%   Weight:   130 kg (286 lb)    Height:         Temp (24hrs), Av 7 °F (37 1 °C), Min:98 4 °F (36 9 °C), Max:99 2 °F (37 3 °C)  Current: Temperature: 98 4 °F (36 9 °C)    Invasive/non-invasive ventilation settings   Respiratory    Lab Data (Last 4 hours)    None         O2/Vent Data (Last 4 hours)    None                Physical Exam  Constitutional:       General: He is not in acute distress  Appearance: He is ill-appearing  Comments: Laying in bed, intubated   HENT:      Ears:      Comments: Patient is hard of hearing     Mouth/Throat:      Comments: intubated  Eyes:      Pupils: Pupils are equal, round, and reactive to light  Cardiovascular:      Rate and Rhythm: Normal rate and regular rhythm  Pulses: Normal pulses  Pulmonary:      Effort: No respiratory distress  Breath sounds: Wheezing present  Comments: Course, tubular breath sounds  Abdominal:      General: There is no distension  Palpations: Abdomen is soft  Tenderness: There is no abdominal tenderness  There is no guarding or rebound  Comments: obese   Musculoskeletal:      Right lower leg: No edema  Left lower leg: No edema  Skin:     General: Skin is warm and dry  Neurological:      Mental Status: He is alert  Motor: Weakness present  Comments: Unchanged focal deficits from yesterday  LLE 1/5, LUE 0/5   Wiggles toes bilaterally, thumbs up with RUE but does not move LUE         Laboratory and Diagnostics:  Results from last 7 days   Lab Units 09/04/21 0435 09/03/21 0455 09/02/21  0457 09/01/21  1202 09/01/21  0627 08/31/21  0735   WBC Thousand/uL 10 17* 13 95* 15 92*  --  10 83* 12 44*   HEMOGLOBIN g/dL 13 2 12 7 14 5  --  14 7 14 9   I STAT HEMOGLOBIN g/dl  --   --   --  14 3  --   --    HEMATOCRIT % 41 5 40 3 44 1  --  45 1 45 0   HEMATOCRIT, ISTAT %  --   --   --  42  --   --    PLATELETS Thousands/uL 183 224 256  --  273 264   NEUTROS PCT %  --  90*  --   --   --  86*   MONOS PCT %  --  6  --   --   --  8     Results from last 7 days   Lab Units 09/04/21 0435 09/03/21 0455 09/02/21 0457 09/01/21  1202 09/01/21  1200 09/01/21  0627 08/31/21  0735   SODIUM mmol/L 144 142 145  --  140 141 139   POTASSIUM mmol/L 4 0 3 7 3 9  --  3 8 3 6 4 4   CHLORIDE mmol/L 114* 112* 113*  --  111* 110* 107   CO2 mmol/L 27 27 25  --  22 24 24   CO2, I-STAT mmol/L  --   --   --  28  --   --   --    ANION GAP mmol/L 3* 3* 7  --  7 7 8   BUN mg/dL 33* 35* 37*  --  27* 23 19   CREATININE mg/dL 0 74 0 89 0 98  --  1 07 0 98 1 06   CALCIUM mg/dL 7 6* 8 3 8 2*  --  8 9 8 7 8 6   GLUCOSE RANDOM mg/dL 134 130 136  --  124 142* 116   ALT U/L  --   --   --   --   --   --  18   AST U/L  --   --   --   --   --   --  27   ALK PHOS U/L  --   --   --   --   --   --  144*   ALBUMIN g/dL  --   --   --   --   --   --  3 4*   TOTAL BILIRUBIN mg/dL  --   --   --   --   --   --  0 86     Results from last 7 days   Lab Units 09/04/21  0435 09/03/21  0455 09/02/21  0457   MAGNESIUM mg/dL 2 9* 3 0* 2 8*   PHOSPHORUS mg/dL 2 3 2 5 2 9      Results from last 7 days   Lab Units 09/01/21  1250 09/01/21  1250   INR  1 08  --    PTT seconds  --  30      Results from last 7 days   Lab Units 09/01/21  1200 08/31/21  1904 08/31/21  1655 08/31/21  0735   TROPONIN I ng/mL 0 05* 0 09* 0 12* 0 05*         ABG:  Results from last 7 days   Lab Units 09/02/21  1054   PH ART  7 428   PCO2 ART mm Hg 38 4   PO2 ART mm Hg 110 5   HCO3 ART mmol/L 24 8   BASE EXC ART mmol/L 0 6   ABG SOURCE  Radial, Right     VBG:  Results from last 7 days   Lab Units 09/02/21  1054 09/01/21  0627   PH JAMES   --  7 406*   PCO2 JAMES mm Hg  --  34 2*   PO2 JAMES mm Hg  --  73 4*   HCO3 JAMES mmol/L  --  21 0*   BASE EXC JAMES mmol/L  --  -2 8   ABG SOURCE  Radial, Right  --      Results from last 7 days   Lab Units 09/01/21  1019 08/31/21  1555   PROCALCITONIN ng/ml 0 08 <0 05       Intake and Output  I/O       08/31 0701 - 09/01 0700 09/01 0701 - 09/02 0700    P  O  0 0    I V  (mL/kg)  1002 9 (8 1)    Total Intake(mL/kg) 0 (0) 1002 9 (8 1)    Urine (mL/kg/hr) 250 980 (0 3)    Total Output 250 980    Net -250 +22 9          Unmeasured Urine Occurrence  1 x        Height and Weights   Height: 5' 10" (177 8 cm) (per 6/4/21 encounter)  IBW (Ideal Body Weight): 73 kg  Body mass index is 41 04 kg/m²  Weight (last 2 days)     Date/Time   Weight    09/04/21 0600   130 (286)    09/03/21 0600   128 (281 5)                Nutrition       Diet Orders   (From admission, onward)             Start     Ordered    09/03/21 1149  Diet Enteral/Parenteral; Tube Feeding No Oral Diet; Jevity 1 2 Diaz; Continuous; 100; Prosource Protein Liquid - One Packet; 100; Water; Every 6 hours  Diet effective now     Comments: Start at 20 cc/hr, and increase by increments of 20 ml/hr every four hours until at goal   Question Answer Comment   Diet Type Enteral/Parenteral    Enteral/Parenteral Tube Feeding No Oral Diet    Tube Feeding Formula: Jevity 1 2 Diaz    Bolus/Cyclic/Continuous Continuous    Tube Feeding Goal Rate (mL/hr): 100    Prosource Protein Liquid - No Carb Prosource Protein Liquid - One Packet    Tube Feeding flush (mL): 100    Water Flush type: Water    Water flush frequency: Every 6 hours    RD to adjust diet per protocol?  No        09/03/21 1150              Active Medications  Scheduled Meds:  Current Facility-Administered Medications   Medication Dose Route Frequency Provider Last Rate    albuterol  2 5 mg Nebulization Q4H PRN Cat Robles PA-C      amLODIPine  10 mg Oral Daily Eduar Gifford MD      aspirin  81 mg Oral Daily Cassie Monsivais DO      atorvastatin  40 mg Per NG Tube Daily With MST, DO      dexmedetomidine  0 1-0 7 mcg/kg/hr Intravenous Titrated Eduar Gifford MD Stopped (09/04/21 0535)    fentanyl citrate (PF)  50 mcg Intravenous Q1H PRN LIYA Oviedo      heparin (porcine)  5,000 Units Subcutaneous Q8H [address] Cassie Monsivais DO      hydrALAZINE  10 mg Intravenous Q6H PRN Eduar Gifford MD      insulin lispro  2-12 Units Subcutaneous Q6H [address] Cassie Monsivais DO      Labetalol HCl  10 mg Intravenous Q4H PRN Eduar Gifford MD      multi-electrolyte  100 mL/hr Intravenous Continuous Caprice DO Loi 100 mL/hr (09/04/21 0737)    polyethylene glycol  17 g Oral Daily Caprice DO Loi      senna  8 8 mg Oral HS Caprice DO Loi       Continuous Infusions:  dexmedetomidine, 0 1-0 7 mcg/kg/hr, Last Rate: Stopped (09/04/21 0535)  multi-electrolyte, 100 mL/hr, Last Rate: 100 mL/hr (09/04/21 0737)      PRN Meds:   albuterol, 2 5 mg, Q4H PRN  fentanyl citrate (PF), 50 mcg, Q1H PRN  hydrALAZINE, 10 mg, Q6H PRN  Labetalol HCl, 10 mg, Q4H PRN        Allergies   No Known Allergies  ---------------------------------------------------------------------------------------  Advance Directive and Living Will:      Power of :    POLST:    ---------------------------------------------------------------------------------------      Michael Mariano MD      Portions of the record may have been created with voice recognition software  Occasional wrong word or "sound a like" substitutions may have occurred due to the inherent limitations of voice recognition software    Read the chart carefully and recognize, using context, where substitutions have occurred

## 2021-09-05 NOTE — PROGRESS NOTES
Daily Progress Note - Critical Care   Libby Fowler 79 y o  male MRN: [id number]  Unit/Bed#: ICU 04 Encounter: [id number]        ----------------------------------------------------------------------------------------  HPI/24hr events:   · Extubated   · Episode of coughing due to secretions overnight with desaturation to 74% off BiPAP during suctioning   · Remains non-verbal, but follows commands     ---------------------------------------------------------------------------------------  SUBJECTIVE  Patient unable to offer verbal complaints     Review of Systems   Unable to perform ROS: Patient nonverbal       ---------------------------------------------------------------------------------------  Assessment and Plan  Principal Problem:    Acute ischemic right MCA stroke ([facility] )  Active Problems:    Osteoarthritis of both hips    Essential hypertension    Mixed hyperlipidemia    Benign prostatic hyperplasia with post-void dribbling    Shortness of breath    Fall    Elevated troponin    Tracheomalacia    Diarrhea    Sepsis ([place] [address] )    COPD (chronic obstructive pulmonary disease) ([facility])    Left-sided weakness    Thromboembolic stroke ([place] [address] )    Syncope    Acute respiratory failure ([place] [address] )  Resolved Problems:    COPD with acute exacerbation ([place] [address] )      Neuro:   · Acute Right M2 CVA  ? S/p tPA 9/1/21  ? Initial NIHSS: 12  ? ASA 81mg daily   ? Atorvastatin 40mg qHS  ? Q2h neuro checks     § Can likely widen to q4h   · Acute metabolic encephalopathy  ? Multifactorial related to CVA, possibly some component of delirium   ? Sleep/wake cycle regulation as able   § Melatonin 6mg qHS  ? CAM-ICU BID  · Sedation/analgesia   ? Continuous infusions:   § Precedex 0 1 mcg/kg/hr  ? Titrate to goal RASS 0  ?  Can likely discontinue today and re-direct patient to maintain BiPAP if needed       CV:    Hypertension   o Maintain -160  o Maintain MAP > 65  o Continue amlodipine 10mg daily    o 8/31/21 Echo: EF 39%, grade 1 diastolic dysfunction   Hyperlipidemia   o Atorvastatin 40mg qHS       Pulm:   Acute hypoxic respiratory failure   o Intubated from 9/2/21 - 9/4/21   o BiPAP 16/6, 40%  - Trial off with episode of hypoxia; SpO2 74%  o Increased secretions with with mild wheezing noted overnight  - Wheezing improved with suctioning of secretions   o Start xopenex/atrovent q6h  o Mucomyst q6h x 4 doses  o Chest physiotherapy  o NT suction PRN   o Consider transition to HFNC if secretions continue to be a problem   o No need for additional diuresis today, goal to keep net even   o STAT CXR largely unchanged with elevated left hemidiaphragm and small area of slight consolidation in RLL which is stable     o Maintain SpO2 >90%  o  Continue pulmonary hygiene  Incentive spirometer q1h while awake, encourage coughing and deep breathing  Upright positioning   o If patient decompensates, may require intubation and possible tracheostomy given history of tracheal malacia    Emphysema/ suspected COPD  o No sign of acute exacerbation at this time  o Xopenex/atrovent q6h  o Start methylprednisolone if patient develops new wheezing    MIGUEL  o BiPAP qHS       GI:    No acute issues  o LFT's WNL  o Normal abdominal exam    Stress ulcer prophylaxis  o Not indicated  o Discontinue IV Protonix    Bowel regimen  o Senna qHS  - Increase to BID  o Miralax daily   o Add dulcolax suppository daily PRN  o Last BM: Unknown      :    BPH  o Baseline creatinine: 0 94 - 0 97  o Admission creatinine: 1 06  o Current creatinine: 0 73  o Holding home terazosin 5mg qHS   o Strict q4h I/O monitoring  o No gloria catheter  o Continue urinary retention protocol   o Continue to follow renal function tests    F/E/N:    Fluids  o Maintenance fluids: None   o Diuresis plan: Negative > 3L  Hold further diuresis today   Goal even    Electrolytes   o Replete electrolytes with as needed to maintain K >4 0, Mag >2 0, Phos >3 0  - Potassium replaced    Nutrition  o NPO  o Not appropriate from a respiratory standpoint to resume tube feeds or anticipate oral feeding   o Consult SLP when more appropriate to evaluate swallowing   o Consult nutrition for tube feed recommendations     Heme/Onc:    No acute issues   o Transfuse for hemoglobin <7 0  o Transfuse for plts <15,000 or < 50,000 in the presence of bleeding    VTE prophylaxis:  SQH TID, SCD's to BLE    Endo/Rheum:    Prediabetes  o Last hemoglobin A1c 5 8% on 8/31/21  o Continue q6h sliding scale algorithm while NPO  o No glucose has been required, although tube feeds have not yet been at goal  o Adjust insulin regimen as needed to maintain goal -180      ID:    Leukocytosis   o WBC pending for this AM   o Likely reactive from critical illness as well as leukemoid reaction from prior steroids  o Closely monitor for developing signs of pneumonia   o Procalcitonin: 0 08 (9/1/21)  - Can repeat if infection is suspected  o Continue to monitor fever and WBC curve off antibiotics     MSK/Skin:    Left hemiparesis   o Reposition q2h, eliminate pressure points while in bed  o Close skin surveillance   o PT/OT   o PMR consulted   o Activity as tolerated     Disposition: Continue Critical Care   Patient appropriate for transfer out of the ICU today?: No  Code Status: Level 1 - Full Code  ---------------------------------------------------------------------------------------  Invasive Devices Review  Invasive Devices     Peripheral Intravenous Line            Peripheral IV Right;Ventral (anterior) Hand -- days    Peripheral IV 09/02/21 Left Antecubital 2 days          Drain            External Urinary Catheter Medium 3 days    NG/OG/Enteral Tube Nasogastric 14 Fr Left nare 2 days              Can any invasive devices be discontinued today?  No  ---------------------------------------------------------------------------------------  OBJECTIVE    Vitals   Vitals:    09/05/21 0200 09/05/21 0300 09/05/21 0305 09/05/21 0400   BP: 160/89 133/51  148/80   Pulse: 70 66  66   Resp: (!) 42 (!) 38  (!) 40   Temp:    98 °F (36 7 °C)   TempSrc:    Oral   SpO2: 98% 98% 98% 99%   Weight:       Height:         Temp (24hrs), Av 3 °F (36 8 °C), Min:98 °F (36 7 °C), Max:98 9 °F (37 2 °C)  Current: Temperature: 98 °F (36 7 °C)      BiPAP Settings  Non-Invasive Ventilation Mode: BiPAP  IPAP (cm): 16 cm  EPAP (cm): 6 cm  FiO2 (%): 40  Rate (Set): 16  Leak (lpm): 39  Total Rate: 39  Spontaneous Vt (mL): 470    Physical Exam  Vitals reviewed  Constitutional:       General: He is awake  Appearance: Normal appearance  He is obese  Interventions: Face mask in place  HENT:      Head: Normocephalic and atraumatic  Mouth/Throat:      Lips: Pink  Mouth: Mucous membranes are dry  Eyes:      Conjunctiva/sclera:      Right eye: Right conjunctiva is injected  Exudate present  Pupils: Pupils are equal, round, and reactive to light  Cardiovascular:      Rate and Rhythm: Regular rhythm  Bradycardia present  Pulses:           Radial pulses are 2+ on the right side and 2+ on the left side  Dorsalis pedis pulses are 2+ on the right side and 2+ on the left side  Heart sounds: Normal heart sounds  Pulmonary:      Effort: Tachypnea present  Breath sounds: Examination of the right-lower field reveals decreased breath sounds  Examination of the left-lower field reveals decreased breath sounds  Decreased breath sounds and rhonchi present  No wheezing or rales  Comments: Moderate strength cough, thick tan-blood tinged secretions   Abdominal:      General: Abdomen is protuberant  There is no distension  Tenderness: There is no abdominal tenderness  Genitourinary:     Comments: Condom catheter: clear, arleen urine   Musculoskeletal:      Cervical back: Full passive range of motion without pain  Right lower leg: No edema  Left lower leg: No edema  Skin:     General: Skin is warm and dry        Capillary Refill: Capillary refill takes less than 2 seconds  Neurological:      Mental Status: He is lethargic  GCS: GCS eye subscore is 3  GCS verbal subscore is 1  GCS motor subscore is 6  Cranial Nerves: Facial asymmetry present  Motor: Weakness present  Comments: Left facial droop  1/5 movement in LUE in proximal muscle group, 0/5 hand   2/5 LLE  Moves foot to commands, no movement in quad muscle noted  3+/5 RLE  4/5 RUE    Psychiatric:         Behavior: Behavior is cooperative             Laboratory and Diagnostics:  Results from last 7 days   Lab Units 09/04/21 0435 09/03/21  0455 09/02/21  0457 09/01/21  1202 09/01/21  0627 08/31/21  0735   WBC Thousand/uL 10 17* 13 95* 15 92*  --  10 83* 12 44*   HEMOGLOBIN g/dL 13 2 12 7 14 5  --  14 7 14 9   I STAT HEMOGLOBIN g/dl  --   --   --  14 3  --   --    HEMATOCRIT % 41 5 40 3 44 1  --  45 1 45 0   HEMATOCRIT, ISTAT %  --   --   --  42  --   --    PLATELETS Thousands/uL 183 224 256  --  273 264   NEUTROS PCT %  --  90*  --   --   --  86*   MONOS PCT %  --  6  --   --   --  8     Results from last 7 days   Lab Units 09/04/21 0435 09/03/21 0455 09/02/21  0457 09/01/21  1202 09/01/21  1200 09/01/21  0627 08/31/21  0735   SODIUM mmol/L 144 142 145  --  140 141 139   POTASSIUM mmol/L 4 0 3 7 3 9  --  3 8 3 6 4 4   CHLORIDE mmol/L 114* 112* 113*  --  111* 110* 107   CO2 mmol/L 27 27 25  --  22 24 24   CO2, I-STAT mmol/L  --   --   --  28  --   --   --    ANION GAP mmol/L 3* 3* 7  --  7 7 8   BUN mg/dL 33* 35* 37*  --  27* 23 19   CREATININE mg/dL 0 74 0 89 0 98  --  1 07 0 98 1 06   CALCIUM mg/dL 7 6* 8 3 8 2*  --  8 9 8 7 8 6   GLUCOSE RANDOM mg/dL 134 130 136  --  124 142* 116   ALT U/L 20  --   --   --   --   --  18   AST U/L 25  --   --   --   --   --  27   ALK PHOS U/L 88  --   --   --   --   --  144*   ALBUMIN g/dL 2 8*  --   --   --   --   --  3 4*   TOTAL BILIRUBIN mg/dL 0 73  --   --   --   --   --  0 86     Results from last 7 days   Lab Units 09/04/21  0435 09/03/21  0455 09/02/21  0457   MAGNESIUM mg/dL 2 9* 3 0* 2 8*   PHOSPHORUS mg/dL 2 3 2 5 2 9      Results from last 7 days   Lab Units 09/01/21  1250 09/01/21  1250   INR  1 08  --    PTT seconds  --  30      Results from last 7 days   Lab Units 09/01/21  1200 08/31/21  1904 08/31/21  1655 08/31/21  0735   TROPONIN I ng/mL 0 05* 0 09* 0 12* 0 05*         ABG:  Results from last 7 days   Lab Units 09/02/21  1054   PH ART  7 428   PCO2 ART mm Hg 38 4   PO2 ART mm Hg 110 5   HCO3 ART mmol/L 24 8   BASE EXC ART mmol/L 0 6   ABG SOURCE  Radial, Right     VBG:  Results from last 7 days   Lab Units 09/02/21  1054 09/01/21  0627   PH JAMES   --  7 406*   PCO2 JAMES mm Hg  --  34 2*   PO2 JAMES mm Hg  --  73 4*   HCO3 JAMES mmol/L  --  21 0*   BASE EXC JAMES mmol/L  --  -2 8   ABG SOURCE  Radial, Right  --      Results from last 7 days   Lab Units 09/01/21  1019 08/31/21  1555   PROCALCITONIN ng/ml 0 08 <0 05       Micro        EKG: Sinus bradycardia  Imaging: CXR: stable   I have personally reviewed pertinent reports  and I have personally reviewed pertinent films in PACS    Intake and Output  I/O       09/03 0701 - 09/04 0700 09/04 0701 - 09/05 0700    I V  (mL/kg) 2480 3 (19 1) 549 3 (4 2)    NG/ 180    Feedings 626 320    Total Intake(mL/kg) 3306 3 (25 4) 1049 3 (8 1)    Urine (mL/kg/hr) 1355 (0 4) 4062 (1 3)    Emesis/NG output 200     Total Output 1555 4062    Net +1751 3 -3012 7          Unmeasured Urine Occurrence  1 x            Height and Weights   Height: 5' 10" (177 8 cm) (per 6/4/21 encounter)  IBW (Ideal Body Weight): 73 kg  Body mass index is 41 04 kg/m²  Weight (last 2 days)     Date/Time   Weight    09/04/21 0600   130 (286)    09/03/21 0600   128 (281 5)                Nutrition       Diet Orders   (From admission, onward)             Start     Ordered    09/05/21 0426  Diet NPO  Diet effective now     Question Answer Comment   Diet Type NPO    RD to adjust diet per protocol?  Yes 09/05/21 0426                   Active Medications  Scheduled Meds:  Current Facility-Administered Medications   Medication Dose Route Frequency Provider Last Rate    acetylcysteine  3 mL Nebulization Q6H LIYA Head      albuterol  2 5 mg Nebulization Q4H PRN Cat Robles PA-C      amLODIPine  10 mg Oral Daily Edmund Dykes MD      aspirin  81 mg Oral Daily Capricimani Monsivais DO      atorvastatin  40 mg Per NG Tube Daily With Vaunte, DO      dexmedetomidine  0 1-0 7 mcg/kg/hr Intravenous Titrated Edmund Dykes MD 0 2 mcg/kg/hr (09/04/21 1721)    fentanyl citrate (PF)  50 mcg Intravenous Q1H PRN LIYA Curry      heparin (porcine)  5,000 Units Subcutaneous Q8H [address] Cassie Monsivais DO      hydrALAZINE  10 mg Intravenous Q6H PRN Edmund Dykes MD      insulin lispro  2-12 Units Subcutaneous Q6H [address] Cassie Monsivais DO      ipratropium  0 5 mg Nebulization Q6H LIYA Head      Labetalol HCl  10 mg Intravenous Q4H PRN Edmund Dykes MD      levalbuterol  1 25 mg Nebulization Q6H LIYA Head      pantoprazole  40 mg Intravenous Q24H [address] Jos Villanueva MD      polyethylene glycol  17 g Oral Daily Cassie Monsivais,       senna  8 8 mg Oral HS Cassie Monsivais DO       Continuous Infusions:  dexmedetomidine, 0 1-0 7 mcg/kg/hr, Last Rate: 0 2 mcg/kg/hr (09/04/21 1721)      PRN Meds:   albuterol, 2 5 mg, Q4H PRN  fentanyl citrate (PF), 50 mcg, Q1H PRN  hydrALAZINE, 10 mg, Q6H PRN  Labetalol HCl, 10 mg, Q4H PRN        Allergies   No Known Allergies  ---------------------------------------------------------------------------------------  Care Time Delivered:   Upon my evaluation, this patient had a high probability of imminent or life-threatening deterioration due to acute hypoxic respiratory failure requiring BiPAP and additional medications and treatments, which required my direct attention, intervention, and personal management  I have personally provided 38 minutes ([phone] to [phone]) of critical care time, exclusive of procedures, teaching, family meetings, and any prior time recorded by providers other than myself  LIYA Calero        Portions of the record may have been created with voice recognition software  Occasional wrong word or "sound a like" substitutions may have occurred due to the inherent limitations of voice recognition software    Read the chart carefully and recognize, using context, where substitutions have occurred

## 2021-09-05 NOTE — PROGRESS NOTES
Progress Note - Neurology   Radames Dawn 79 y o  male MRN: [id number]  Unit/Bed#: ICU 04 Encounter: [id number]      Assessment/Plan     Left-sided weakness  Assessment & Plan  61-year-old male with HTN, HLD, tobacco abuse, COPD, BPH who presented to [facility] on 08/31/2021 following a fall at home  Stroke alert called on 09/01/2021 for new onset left upper and lower extremity weakness  NIHSS 12  LUE/LLE and left facial weakness, and severely dysarthric with a right gaze preference  CT head unremarkable for acute intracranial abnormalities  CTA head neck demonstrated significant stenosis of right M1, M2, and distal M3, and right A2  Radiologist noted right M2 occlusion  Patient felt not to be an IR candidate per neurosurgery but was given IV tPA  · Repeat CT head: stable with no acute hemorrhage  · Echocardiogram: LVEF 55%, left atrium mildly dilated  Mild to moderate MAC  · MRI Brain demonstrated rather sizable acute infarct in the right basal ganglia and corona radiata, chronic left lacunar infarcts, and small chronic microhemorrhages scattered throughout the hemispheres, possibly secondary to chronic hypertension  Concern for possible CAA  · , A1c 5 8  Stroke etiology felt to be proximal embolic given size of infarct, versus small vessel  Plan:  - recommend CRYSTAL when able  If unrevealing, would recommend loop recorder implantation  - telemetry monitoring   - goal normotension, euglycemia  - continue ASA 81 mg daily  - continue atorvastatin 40 mg daily  - PT/OT/SL  - Frequent neuro checks, STAT CT Head for acute changes, and notify Neurology    Shortness of breath  Assessment & Plan  - Patient extubated to BiPAP  - ongoing [address] discussion with family per Critical Care  Essential hypertension  Assessment & Plan  - goal normotension  - Management per ICU team          Radames Dawn will need follow up in in 6 weeks with neurovascular attending or advance practitioner   He will not require outpatient neurological testing  Subjective:   Patient was extubated yesterday  Currently on BiPAP  Was still on Precedex as of this am   Still nonverbal with flaccid LUE     ROS: unable to obtain due to patient's mentation/nonverbal state  Vitals: Blood pressure 140/65, pulse 64, temperature 98 6 °F (37 °C), temperature source Rectal, resp  rate (!) 33, height 5' 10" (1 778 m), weight 130 kg (286 lb), SpO2 97 %  ,Body mass index is 41 04 kg/m²  Physical Exam:   General:  Patient is well-developed, obese BMI 41 04, and in no acute distress  HEENT:  Head normocephalic  Eyes anicteric  Mouth remained open  Cardiovascular:  With regular rhythm  Lungs: On BiPAP  Extremities:  With no significant edema  Skin: No rashes  Neurologic:  Patient is somnolent but alerts to touch  Does not offer any verbalizations  Able to give "thumbs up" on right to command and cooperate with some simple commands but remains abulic  Gait deferred for safety  Cranial Nerves:   II: Positive BL blink to threat  Pupils equal, round, reactive to light with normal accomodation  Cannot visualize optic fundi  III,IV,VI: patient would not participate in EOM testing  VII: unable to assess due to BiPAP  VIII: [place]  Coordination: unable to assess  LUE flaccid  Able to shrug L shoulder somewhat but otherwise no movement noted  Gives thumbs up on right, weak R hand   Right upper extremity drift noted  Patient spontaneously moving right lower extremity but with no proximal left lower extremity movement noted  Was able to weakly plantar and dorsiflex left ankle      Lab, Imaging and other studies:   CBC:   Results from last 7 days   Lab Units 09/04/21  0435 09/03/21  0455 09/02/21  0457   WBC Thousand/uL 10 17* 13 95* 15 92*   RBC Million/uL 4 41 4 30 4 78   HEMOGLOBIN g/dL 13 2 12 7 14 5   HEMATOCRIT % 41 5 40 3 44 1   MCV fL 94 94 92   PLATELETS Thousands/uL 183 224 256   , BMP/CMP:   Results from last 7 days   Lab Units 09/05/21  0425 09/04/21  0435 09/03/21  0455 09/01/21  1202 08/31/21  0735   SODIUM mmol/L 143 144 142  --  139   POTASSIUM mmol/L 3 9 4 0 3 7  --  4 4   CHLORIDE mmol/L 111* 114* 112*  --  107   CO2 mmol/L 29 27 27  --  24   CO2, I-STAT mmol/L  --   --   --  28  --    BUN mg/dL 31* 33* 35*  --  19   CREATININE mg/dL 0 73 0 74 0 89  --  1 06   GLUCOSE, ISTAT mg/dl  --   --   --  131  --    CALCIUM mg/dL 7 6* 7 6* 8 3  --  8 6   AST U/L  --  25  --   --  27   ALT U/L  --  20  --   --  18   ALK PHOS U/L  --  88  --   --  144*   EGFR ml/min/1 73sq m 88 88 81  --  66     VTE Prophylaxis: Sequential compression device (Venodyne)     Counseling / Coordination of Care  Total Critical Care time spent 25 minutes excluding procedures, teaching and family updates

## 2021-09-05 NOTE — PROGRESS NOTES
Progress note - Palliative and Supportive Care   Slim Schulte 79 y o  male [id number]    Patient Active Problem List   Diagnosis    Osteoarthritis of both hips    Essential hypertension    Mixed hyperlipidemia    Benign colon polyp    Health care maintenance    Benign prostatic hyperplasia with post-void dribbling    Acute cystitis without hematuria    Hypocalcemia    Alkaline phosphatase elevation    Shortness of breath    Fall    Elevated troponin    Tracheomalacia    Diarrhea    Sepsis (HCC)    COPD (chronic obstructive pulmonary disease) (HCC)    Left-sided weakness    Thromboembolic stroke (HCC)    Syncope    Acute respiratory failure (HCC)    Acute ischemic right MCA stroke (HCC)     Active issues specifically addressed today include:   - R basal ganglia/M2 ischemic stroke s/p tPA   - encephalopathy   - acute hypoxemic respiratory failure   - presumed COPD, 50+ pk-yr h/o tobacco use   - s/p intubation [09/02]   - goals of care support    Plan:  #symptom management   - per CCM team    #goals of care   - family meeting yesterday [see documentation]   - extubated yesterday to BiPAP, overnight episode POX 70s, RR remains in 30-40s   - spoke with patient's daughter [Nichole] via telephone [[phone]]   - discussed medical course overnight with patient desatting to 76s, remains tachypneic  Given patient's underlying lung disease, recent stroke, and current respiratory status, would likely require intubation and remain machine-dependent indefinitely  As this was identified by daughter as a poor QOL for the patient, further discussion regarding next steps required  - during phone call received call from [facility] and our call concluded   - called Kenton Cooper back, family is coming in to hospital for further discussion   - daughter at bedside with grandson, further discussed medical trajectory  Patient transitioned off of BiPAP safely and now with improved respiratory status     - decision to change code status to Level 3 if patient is to deteriorate given low likelihood for meaningful neurologic recovery if patient were to arrest and chances of remaining requirement for permanent mechanical support is high if further deterioration  - confirmed code status change to Level 3    #psychosocial support   - emotional support provided   - Elio Eye [phone]      We appreciate the opportunity to participate in this patient's care  We will continue to follow  Please do not hesitate to contact our on-call provider through our clinic answering service at [phone] should you have acute symptom control concerns  Code Status: DNAR/DNI - Level 3   Decisional apparatus:  Patient is not competent on my exam today  If competence is lost, patient's substitute decision maker would default to adult children by [place]     Advance Directive / Living Will / POLST:  Not on File, awaiting family to provide completed documentation    Interval history:   - extubated yesterday at 56   - required BiPAP support, overnight desat 70s, remains tachypneic 30-40s   - remains on precedex for anxiolytic for dyspnea    MEDICATIONS / ALLERGIES:     current meds:   Current Facility-Administered Medications   Medication Dose Route Frequency    acetylcysteine (MUCOMYST) 200 mg/mL inhalation solution 600 mg  3 mL Nebulization Q6H    albuterol inhalation solution 2 5 mg  2 5 mg Nebulization Q4H PRN    amLODIPine (NORVASC) tablet 10 mg  10 mg Oral Daily    aspirin chewable tablet 81 mg  81 mg Oral Daily    atorvastatin (LIPITOR) tablet 40 mg  40 mg Per NG Tube Daily With Dinner    bisacodyl (DULCOLAX) rectal suppository 10 mg  10 mg Rectal Daily PRN    dexmedetomidine (PRECEDEX) 400 mcg in sodium chloride 0 9% 100 ml IVPB  0 1-0 7 mcg/kg/hr Intravenous Titrated    heparin (porcine) subcutaneous injection 5,000 Units  5,000 Units Subcutaneous Q8H [address]    hydrALAZINE (APRESOLINE) injection 10 mg  10 mg Intravenous Q6H PRN  insulin lispro (HumaLOG) 100 units/mL subcutaneous injection 2-12 Units  2-12 Units Subcutaneous Q6H [address]    ipratropium (ATROVENT) 0 02 % inhalation solution 0 5 mg  0 5 mg Nebulization Q6H    Labetalol HCl (NORMODYNE) injection 10 mg  10 mg Intravenous Q4H PRN    levalbuterol (XOPENEX) inhalation solution 1 25 mg  1 25 mg Nebulization Q6H    lisinopril (ZESTRIL) tablet 5 mg  5 mg Oral Daily    melatonin tablet 6 mg  6 mg Oral HS    polyethylene glycol (MIRALAX) packet 17 g  17 g Oral Daily    senna oral syrup 8 8 mg  8 8 mg Per NG Tube BID       No Known Allergies    OBJECTIVE:    Physical Exam  Physical Exam  Vitals and nursing note reviewed  Constitutional:       General: He is awake  Appearance: He is not diaphoretic  Comments: Chronically ill appearing  Non-toxic appearing   HENT:      Head: Normocephalic and atraumatic  Right Ear: External ear normal       Left Ear: External ear normal    Eyes:      Comments: No gaze preference   Cardiovascular:      Rate and Rhythm: Normal rate  Pulmonary:      Effort: Tachypnea present  No accessory muscle usage or respiratory distress  Comments: BiPAP full mask in place  Musculoskeletal:      Cervical back: Normal range of motion  Neurological:      General: No focal deficit present  Mental Status: He is lethargic  Comments: LUE strength 0/5         Lab Results:   I have personally reviewed pertinent labs  , CBC: No results found for: WBC, HGB, HCT, MCV, PLT, ADJUSTEDWBC, MCH, MCHC, RDW, MPV, NRBC, CMP:   Lab Results   Component Value Date    SODIUM 143 09/05/2021    K 3 9 09/05/2021     (H) 09/05/2021    CO2 29 09/05/2021    BUN 31 (H) 09/05/2021    CREATININE 0 73 09/05/2021    CALCIUM 7 6 (L) 09/05/2021    EGFR 88 09/05/2021   , PT/PTT:No results found for: PT, PTT  Imaging Studies: Reviewed  EKG, Pathology, and Other Studies: Reviewed    Counseling / Coordination of Care    Total floor / unit time spent today 45+ minutes  Greater than 50% of total time was spent with the patient and / or family counseling and / or coordination of care  A description of the counseling / coordination of care: chart review, symptoms assessment, emotional support  Code status change  Goals of care support   Coordinated plan of care with primary team

## 2021-09-06 NOTE — PROGRESS NOTES
I have personally seen and examined patient and reviewed all data with resident  Agree with note, assessment and plan  Critical care time 0 min  Please refer to attending comments below  Critical care time does not include procedures, family meeting or teaching  Please refer to Critical Care resident progress note for full details on review of systems, medications and laboratory data    Right MCA acute stroke with M2 occlusion -status post tPA initial NIH 12 9/1/21  Acute hypoxic/ hypercarbic respiratory failure secondary to COPD exacerbation /pneumonia  Left pleural effusion  Tracheal malacia  Hypertension  -amlodipine and lisinopril  Hyperlipidemia  -continue statin  Obesity  BPH  Tobacco abuse/ nicotine dependence    BP goal: 120-160    Physical exam:  Tachypnea, sleepy, left facial droop, lue 1/5, lle 2/5 wiggled toes, rle 3/5, rue 4/5    IO -2  04L    Continue with neurologic q 4 hours  Repeat imaging at 24 hours status post tPA  MRI  completed  Plan to initiate anti-platelet therapy with  Aspirin 81 mg   Statin medication ordered  Continue with stroke protocol with PT/OT and speech evaluation  HbA1C, lipid panel and echo ordered  Neurology consult  Plan to to reimage if patient has decline in neurologic exam or < GCS by 2 points or more  Family meeting with palliative patient Adelaide Galicia, family has realistic expectations  Would not pursue CRYSTAL with respiratory status and DNR3  Swallow eval     Bipap held due to concern with secretions  Aggressive suction and airway clearance protocol  Consider glycopyrrolate vs scopolamine for secretions  Also consider HFNC for support/ flow for WOB  Patient presently off steroids  Maintain sat >90%  Nebs do not seem to be improving respiratory status  Consider inhaled HTS 3%  Consider restarting steroids  Hold diuresis, patient labs concerning for intravascular volume contraction       Update:  NT suctioning with thick tan secretions, check obstruction series to eval NGT placement  Possible aspiration  NGT placement checked with bowel sounds auscultated with air insufflation

## 2021-09-06 NOTE — PROGRESS NOTES
Daily Progress Note - Critical Care   Martin Kendall 79 y o  male MRN: [id number]  Unit/Bed#: ICU 04 Encounter: [id number]        ----------------------------------------------------------------------------------------  HPI/24hr events:   79 y  o  male with HTN, current smoker x 50+ years, HLD, obesity, BPH, tracheomalacia [facility] with acute hypoxic respiratory failure and syncope  Admitted for COPD exacerbation, started on steroids, nebs, and BiPAP  On 9/1 a stroke alert was called given acute left sided UE and LE weakness, left facial droop and dysarthria  Stat CTA head showed M2 occlusion of right MCA  Patient initiated on tPA and brought to unit  24 hour events:  · Code status now level 3  · Patient is off BIPAP, not doing well with oxygenation and becoming delirious (pulling at lines)  · Seems to be getting very dry   · Having PACs (not new)     ---------------------------------------------------------------------------------------  SUBJECTIVE  Patient resting comfortably in bed this morning, receiving respiratory therapy  He sounds like he is snoring even when awake  Very lethargic  Review of Systems   Unable to perform ROS: Acuity of condition       ---------------------------------------------------------------------------------------  Assessment and Plan  Principal Problem:    Acute ischemic right MCA stroke [facility])  Active Problems:    Osteoarthritis of both hips    Essential hypertension    Mixed hyperlipidemia    Benign prostatic hyperplasia with post-void dribbling    Shortness of breath    Fall    Elevated troponin    Tracheomalacia    Diarrhea    Sepsis ([facility] [address] )    COPD (chronic obstructive pulmonary disease) ([facility])    Left-sided weakness    Thromboembolic stroke ([facility] )    Syncope    Acute respiratory failure ([facility] )  Resolved Problems:    COPD with acute exacerbation ([facility])      Neuro:   · Acute Right M2 CVA  ? S/p tPA 9/1/21  ? Initial NIHSS: 12  ? ASA 81mg daily   ?  Atorvastatin 40mg qHS  ? Per neuro, CRYSTAL to assess for cardioembolic source when able   ? Q2h neuro checks     § Can change q4h   · Acute metabolic encephalopathy  ? Multifactorial related to CVA, possibly some component of delirium   ? Sleep/wake cycle regulation as able   § Melatonin 6mg qHS  ? CAM-ICU BID  · Sedation/analgesia   ? Precedex discontinued       CV:    Hypertension   o Maintain -160  o Maintain MAP > 65  o Continue amlodipine 10mg daily    o 8/31/21 Echo: EF 37%, grade 1 diastolic dysfunction   Hyperlipidemia   o Atorvastatin 40mg qHS       Pulm:   Acute hypoxic respiratory failure   o Intubated from 9/2/21 - 9/4/21   - BiPAP not tolerated well, discontinued  o Continue xopenex/atrovent q6h  o Mucomyst q6h x 4 doses  o Chest physiotherapy  o NT suction PRN   o Consider transition to HFNC if secretions continue to be a problem   o XR abd obstruction series ordered to make sure NG tube is not in esophagus   o Hold diuresis  o STAT CXR 9/5 largely unchanged with elevated left hemidiaphragm and small area of slight consolidation in RLL which is stable     o Maintain SpO2 >90%  o  Continue pulmonary hygiene  Incentive spirometer q1h while awake, encourage coughing and deep breathing   Upright positioning   o Consider steroids   o Also consider glycopyrrolate or scopolamine to help with secretions  o If patient decompensates, do not intubate - now level 3 DNR/DNI    Emphysema/ suspected COPD  o No sign of acute exacerbation at this time  o Xopenex/atrovent q6h  o Start methylprednisolone if patient develops new wheezing    MIGUEL  o BiPAP discontinued  o Can consider HFNC       GI:    No acute issues  o LFT's WNL  o Normal abdominal exam    Stress ulcer prophylaxis  o Not indicated  o Discontinued IV Protonix    Bowel regimen  o Senna BID  o Miralax daily   o Add dulcolax suppository daily PRN  o Last BM: 9/6     :    BPH  o Baseline creatinine: 0 94 - 0 97  o Admission creatinine: 1 06  o Current creatinine: 0 79  o Holding home terazosin 5mg qHS   o Strict q4h I/O monitoring - 2 4 L today  o No gloria catheter  o Continue urinary retention protocol   o Continue to follow renal function tests    F/E/N:    Fluids  o Maintenance fluids: None   o Holding diuresis   Electrolytes   o Replete electrolytes with as needed to maintain K >4 0, Mag >2 0, Phos >3 0  - Phos and K repleted today   Nutrition  o Tube feeds resumed 9/5  - Per nutrition: Recommend increase Jevity 1 2 by 10 ml q 4 hrs as lane to goal rate of 66 ml/hr to provide qd: 1584ml, 1901 kcal,88 gm PRO,268 gm CHO,62 gm Fat,1278 ml Free H2O,1 45 mg CHO/kg/min  Adjust magnesium   Recommend 100 ml Free H2O flush q 4 hrs    o Consult SLP to evaluate swallowing     Heme/Onc:    No acute issues   o Transfuse for hemoglobin <7 0  o Transfuse for plts <15,000 or < 50,000 in the presence of bleeding    VTE prophylaxis:  SQH TID, SCD's to BLE    Endo/Rheum:    Prediabetes  o Last hemoglobin A1c 5 8% on 8/31/21  o Continue SSI  o Adjust insulin regimen as needed to maintain goal -180    ID:    Leukocytosis   o WBC 13 95 --> 10 17 --> 11 34 today (most likely 2/2 diuresis yesterday)  o Likely reactive from critical illness as well as leukemoid reaction from prior steroids  o Closely monitor for developing signs of pneumonia   o Procalcitonin: 0 08 (9/1/21)  - Can repeat if infection is suspected  o Continue to monitor fever and WBC curve off antibiotics     MSK/Skin:    Left hemiparesis   o Reposition q2h, eliminate pressure points while in bed  o Close skin surveillance   o PT/OT   o PM&R following   o Activity as tolerated     Disposition: Continue Critical Care   Patient appropriate for transfer out of the ICU today?: No  Code Status: Level 3 - DNAR and DNI  ---------------------------------------------------------------------------------------  Invasive Devices Review  Invasive Devices     Peripheral Intravenous Line            Peripheral IV Right;Ventral (anterior) Hand -- days    Peripheral IV 21 Left Antecubital 3 days          Drain            NG/OG/Enteral Tube Nasogastric 14 Fr Left nare 4 days    External Urinary Catheter Small <1 day              Can any invasive devices be discontinued today? No  ---------------------------------------------------------------------------------------  OBJECTIVE    Vitals   Vitals:    21 0600 21 0700 21 0714 21 0841   BP: 149/68 146/71  149/68   Pulse: 70 68     Resp: (!) 35 (!) 30     Temp:       TempSrc:       SpO2: 96% 97% 97%    Weight:       Height:         Temp (24hrs), Av 2 °F (36 8 °C), Min:97 5 °F (36 4 °C), Max:98 6 °F (37 °C)  Current: Temperature: 98 2 °F (36 8 °C)      BiPAP Settings  Non-Invasive Ventilation Mode: BiPAP  IPAP (cm): 16 cm  EPAP (cm): 6 cm  FiO2 (%): 40  Rate (Set): 16  Leak (lpm): 16  Total Rate: 32  Spontaneous Vt (mL): 505    Physical Exam  Vitals reviewed  Constitutional:       General: He is awake  Appearance: Normal appearance  He is obese  Interventions: Face mask in place  HENT:      Head: Normocephalic and atraumatic  Ears:      Comments: Hard of hearing     Mouth/Throat:      Lips: Pink  Mouth: Mucous membranes are dry  Eyes:      Pupils: Pupils are equal, round, and reactive to light  Cardiovascular:      Rate and Rhythm: Normal rate and regular rhythm  Heart sounds: Normal heart sounds  Pulmonary:      Effort: Tachypnea present  Comments: Upper airway transmitted noise  Abdominal:      General: Abdomen is protuberant  There is no distension  Tenderness: There is no abdominal tenderness  Genitourinary:     Comments: Condom catheter: clear, arleen urine   Musculoskeletal:      Cervical back: Full passive range of motion without pain  Right lower leg: No edema  Left lower leg: No edema  Skin:     General: Skin is warm and dry        Capillary Refill: Capillary refill takes less than 2 seconds  Neurological:      Mental Status: He is lethargic  Cranial Nerves: Facial asymmetry present  Motor: Weakness present  Comments: Left facial droop  1/5 movement in LUE in proximal muscle group, 0/5 hand   2/5 LLE  Moves foot to commands, no movement in quad muscle noted  3+/5 RLE  4/5 RUE    Psychiatric:         Behavior: Behavior is cooperative             Laboratory and Diagnostics:  Results from last 7 days   Lab Units 09/06/21  0516 09/04/21  0435 09/03/21  0455 09/02/21  0457 09/01/21  1202 09/01/21  0627 08/31/21  0735   WBC Thousand/uL 11 34* 10 17* 13 95* 15 92*  --  10 83* 12 44*   HEMOGLOBIN g/dL 14 5 13 2 12 7 14 5  --  14 7 14 9   I STAT HEMOGLOBIN g/dl  --   --   --   --  14 3  --   --    HEMATOCRIT % 45 2 41 5 40 3 44 1  --  45 1 45 0   HEMATOCRIT, ISTAT %  --   --   --   --  42  --   --    PLATELETS Thousands/uL 236 183 224 256  --  273 264   NEUTROS PCT % 79*  --  90*  --   --   --  86*   MONOS PCT % 12  --  6  --   --   --  8     Results from last 7 days   Lab Units 09/06/21  0516 09/05/21  2149 09/05/21  0425 09/04/21  0435 09/03/21  0455 09/02/21  0457 09/01/21  1202 09/01/21  1200 08/31/21  0735   SODIUM mmol/L 145 143 143 144 142 145  --  140 139   POTASSIUM mmol/L 3 9 3 4* 3 9 4 0 3 7 3 9  --  3 8 4 4   CHLORIDE mmol/L 113* 110* 111* 114* 112* 113*  --  111* 107   CO2 mmol/L 29 32 29 27 27 25  --  22 24   CO2, I-STAT mmol/L  --   --   --   --   --   --  28  --   --    ANION GAP mmol/L 3* 1* 3* 3* 3* 7  --  7 8   BUN mg/dL 35* 34* 31* 33* 35* 37*  --  27* 19   CREATININE mg/dL 0 79 0 85 0 73 0 74 0 89 0 98  --  1 07 1 06   CALCIUM mg/dL 8 2* 7 8* 7 6* 7 6* 8 3 8 2*  --  8 9 8 6   GLUCOSE RANDOM mg/dL 104 96 118 134 130 136  --  124 116   ALT U/L  --   --   --  20  --   --   --   --  18   AST U/L  --   --   --  25  --   --   --   --  27   ALK PHOS U/L  --   --   --  88  --   --   --   --  144*   ALBUMIN g/dL  --   --   --  2 8*  --   --   --   --  3 4*   TOTAL BILIRUBIN mg/dL  --   --   --  0 73  --   --   --   --  0 86     Results from last 7 days   Lab Units 09/06/21  0516 09/05/21  0425 09/04/21  0435 09/03/21  0455 09/02/21  0457   MAGNESIUM mg/dL 3 2* 2 6 2 9* 3 0* 2 8*   PHOSPHORUS mg/dL 2 7 3 0 2 3 2 5 2 9      Results from last 7 days   Lab Units 09/01/21  1250 09/01/21  1250   INR  1 08  --    PTT seconds  --  30      Results from last 7 days   Lab Units 09/01/21  1200 08/31/21  1904 08/31/21  1655 08/31/21  0735   TROPONIN I ng/mL 0 05* 0 09* 0 12* 0 05*         ABG:  Results from last 7 days   Lab Units 09/05/21  0426   PH ART  7 420   PCO2 ART mm Hg 42 3   PO2 ART mm Hg 145 6*   HCO3 ART mmol/L 26 8   BASE EXC ART mmol/L 2 1   ABG SOURCE  Radial, Right     VBG:  Results from last 7 days   Lab Units 09/05/21  0927 09/05/21  0426   PH JAMES  7 459*  --    PCO2 JAMES mm Hg 42 3  --    PO2 JAMES mm Hg 78 2*  --    HCO3 JAMES mmol/L 29 3  --    BASE EXC JAMES mmol/L 5 0  --    ABG SOURCE   --  Radial, Right     Results from last 7 days   Lab Units 09/01/21  1019 08/31/21  1555   PROCALCITONIN ng/ml 0 08 <0 05       Micro        EKG: Sinus bradycardia  Imaging: CXR: stable   I have personally reviewed pertinent reports  Intake and Output  I/O       09/03 0701 - 09/04 0700 09/04 0701 - 09/05 0700    I V  (mL/kg) 2480 3 (19 1) 549 3 (4 2)    NG/ 180    Feedings 626 320    Total Intake(mL/kg) 3306 3 (25 4) 1049 3 (8 1)    Urine (mL/kg/hr) 1355 (0 4) 4062 (1 3)    Emesis/NG output 200     Total Output 1555 4062    Net +1751 3 -3012 7          Unmeasured Urine Occurrence  1 x            Height and Weights   Height: 5' 10" (177 8 cm) (per 6/4/21 encounter)  IBW (Ideal Body Weight): 73 kg  Body mass index is 41 04 kg/m²    Weight (last 2 days)     Date/Time   Weight    09/04/21 0600   130 (286)                Nutrition       Diet Orders   (From admission, onward)             Start     Ordered    09/05/21 1916  Diet Enteral/Parenteral; Tube Feeding No Oral Diet; Jevity 1 2 Diaz; Continuous; 80; Prosource Protein Liquid - One Packet  Diet effective now     Question Answer Comment   Diet Type Enteral/Parenteral    Enteral/Parenteral Tube Feeding No Oral Diet    Tube Feeding Formula: Jevity 1 2 Diaz    Bolus/Cyclic/Continuous Continuous    Tube Feeding Goal Rate (mL/hr): 80    Prosource Protein Liquid - No Carb Prosource Protein Liquid - One Packet    RD to adjust diet per protocol?  Yes        09/05/21 1915                   Active Medications  Scheduled Meds:  Current Facility-Administered Medications   Medication Dose Route Frequency Provider Last Rate    albuterol  2 5 mg Nebulization Q4H PRN Cat Robles PA-C      amLODIPine  10 mg Oral Daily Mattie Jacques MD      aspirin  81 mg Oral Daily Capkimber Monsivais, DO      atorvastatin  40 mg Per NG Tube Daily [employer], DO      bisacodyl  10 mg Rectal Daily PRN LIYA Infante      furosemide  20 mg Intravenous Daily Cassie Monsivais, DO      heparin (porcine)  5,000 Units Subcutaneous Q8H [address] Cassie Monsivais, DO      hydrALAZINE  10 mg Intravenous Q6H PRN Mattie Jacques MD      insulin lispro  2-12 Units Subcutaneous Q6H [address] Cassie Monsivais, DO      ipratropium  0 5 mg Nebulization Q6H LIYA Infante      Labetalol HCl  10 mg Intravenous Q4H PRN Mattie Jacques MD      levalbuterol  1 25 mg Nebulization Q6H LIYA Infante      lisinopril  5 mg Oral Daily Cassie Hinds, DO      melatonin  6 mg Oral HS LIYA Infante      polyethylene glycol  17 g Oral Daily LIYA Infante      potassium chloride  20 mEq Oral Daily Cassie Monsivais, DO      senna  8 8 mg Per NG Tube BID LIYA Martino       Continuous Infusions:     PRN Meds:   albuterol, 2 5 mg, Q4H PRN  bisacodyl, 10 mg, Daily PRN  hydrALAZINE, 10 mg, Q6H PRN  Labetalol HCl, 10 mg, Q4H PRN        Allergies   No Known Allergies  ---------------------------------------------------------------------------------------    Curtis Hyde MD        Portions of the record may have been created with voice recognition software  Occasional wrong word or "sound a like" substitutions may have occurred due to the inherent limitations of voice recognition software    Read the chart carefully and recognize, using context, where substitutions have occurred

## 2021-09-07 NOTE — UTILIZATION REVIEW
Continued Stay Review    Date: 21                          Current Patient Class: inpatient  Current Level of Care: ICU  HPI:80 y o  male initially admitted on 21     Assessment/Plan:   Right basal ganglia/ M2 ischemic stroke s/p tPA NIHSS 12    HPI/24hr events:   Extubated  Episode of coughing due to secretions overnight with desaturation to 74% off BiPAP during suctioning  Remains non-verbal, but follows commands   D/c Precedex  Extubated to BIPAP   Stopped BIPAP today and doing well  Consider BIPAP qhs  Lasix X2 on  with approx  3 L out, dose Lasix today  Decadron 10 mg  post extubation for mild stridor  Start Xopenex, Atrovent; consider SoluMedrol 40mg q12 hours if needed  Discussed GOC with daughter and grandson in conjunction with palliative care- LEVEL 3 DNAR  Restart TF today as trickle and increase as tolerated  BP control with SBP goal 140 to 160 mmHg- Norvasc, add Lisinopril, PRN meds         Vital Signs:    21 0200 21 0300 21 0305 21 0400   BP: 160/89 133/51   148/80   Pulse: 70 66   66   Resp: (!) 42 (!) 38   (!) 40   Temp:       98 °F (36 7 °C)   TempSrc:       Oral   SpO2: 98% 98% 98% 99%   Weight:           Height:                 Temp (24hrs), Av 3 °F (36 8 °C), Min:98 °F (36 7 °C), Max:98 9 °F (37 2 °C)  Current: Temperature: 98 °F (36 7 °C)    Pertinent Labs/Diagnostic Results:   Results from last 7 days   Lab Units 21  04321  0516 21  0435 21  0455 21  0457   WBC Thousand/uL 14 63* 11 34* 10 17* 13 95* 15 92*   HEMOGLOBIN g/dL 14 4 14 5 13 2 12 7 14 5   HEMATOCRIT % 46 2 45 2 41 5 40 3 44 1   PLATELETS Thousands/uL 221 236 183 224 256   NEUTROS ABS Thousands/µL 11 96* 8 90*  --  12 56*  --      Results from last 7 days   Lab Units 21  0432 21  0516 21  2149 21  0425 21  0435 21  0455 21  0457   SODIUM mmol/L 145 145 143 143 144 142 145   POTASSIUM mmol/L 4 3 3 9 3 4* 3 9 4 0 3 7 3 9   CHLORIDE mmol/L 113* 113* 110* 111* 114* 112* 113*   CO2 mmol/L 31 29 32 29 27 27 25   ANION GAP mmol/L 1* 3* 1* 3* 3* 3* 7   BUN mg/dL 36* 35* 34* 31* 33* 35* 37*   CREATININE mg/dL 0 91 0 79 0 85 0 73 0 74 0 89 0 98   EGFR ml/min/1 73sq m 80 85 83 88 88 81 73   CALCIUM mg/dL 8 2* 8 2* 7 8* 7 6* 7 6* 8 3 8 2*   CALCIUM, IONIZED mmol/L  --   --   --   --   --  1 12  --    MAGNESIUM mg/dL  --  3 2*  --  2 6 2 9* 3 0* 2 8*   PHOSPHORUS mg/dL  --  2 7  --  3 0 2 3 2 5 2 9     Results from last 7 days   Lab Units 09/04/21  0435   AST U/L 25   ALT U/L 20   ALK PHOS U/L 88   TOTAL PROTEIN g/dL 6 1*   ALBUMIN g/dL 2 8*   TOTAL BILIRUBIN mg/dL 0 73   BILIRUBIN DIRECT mg/dL 0 26*     Results from last 7 days   Lab Units 09/07/21  0512 09/06/21  2333 09/06/21  1737 09/06/21  1204 09/06/21  0047 09/05/21  1732 09/05/21  1207 09/05/21  1151 09/05/21  0615 09/04/21  2334 09/04/21  1720 09/04/21  1140   POC GLUCOSE mg/dl 155* 151* 120 123 195* 336* 109 100 121 116 112 102     Results from last 7 days   Lab Units 09/07/21  0432 09/06/21  0516 09/05/21  2149 09/05/21  0425 09/04/21  0435 09/03/21  0455 09/02/21  0457 09/01/21  1200 09/01/21  0627   GLUCOSE RANDOM mg/dL 145* 104 96 118 134 130 136 124 142*     Results from last 7 days   Lab Units 09/05/21  0426 09/02/21  1054   PH ART  7 420 7 428   PCO2 ART mm Hg 42 3 38 4   PO2 ART mm Hg 145 6* 110 5   HCO3 ART mmol/L 26 8 24 8   BASE EXC ART mmol/L 2 1 0 6   O2 CONTENT ART mL/dL 20 1 20 0   O2 HGB, ARTERIAL % 98 2* 97 4*   ABG SOURCE  Radial, Right Radial, Right     Results from last 7 days   Lab Units 09/05/21  0927 09/01/21  0627   PH JAMES  7 459* 7 406*   PCO2 JAMES mm Hg 42 3 34 2*   PO2 JAMES mm Hg 78 2* 73 4*   HCO3 JAMES mmol/L 29 3 21 0*   BASE EXC JAMES mmol/L 5 0 -2 8   O2 CONTENT JAMES ml/dL 19 0 20 3   O2 HGB, VENOUS % 94 4* 94 4*     Results from last 7 days   Lab Units 09/01/21  1202   PH, JAMES I-STAT  7 453*   PCO2, JAMES ISTAT mm HG 37 7*   PO2, JAMES ISTAT mm HG 49 0* HCO3, JAMES ISTAT mmol/L 26 4   I STAT BASE EXC mmol/L 2   I STAT O2 SAT % 86*     Results from last 7 days   Lab Units 09/01/21  1200 08/31/21  1904 08/31/21  1655   TROPONIN I ng/mL 0 05* 0 09* 0 12*     Results from last 7 days   Lab Units 09/01/21  1250 09/01/21  1250   PROTIME seconds 13 5  --    INR  1 08  --    PTT seconds  --  30     Results from last 7 days   Lab Units 09/07/21  0432 09/06/21  2243 09/01/21  1019 08/31/21  1555   PROCALCITONIN ng/ml 0 12 0 11 0 08 <0 05     9/2  Cxr=  No definite acute cardiopulmonary disease on this study slightly limited by low lung volumes  Cxr=  Life-support tubes as above  Mild elevation left diaphragm without acute cardiopulmonary disease  Ct  Head=  Acute infarct in the right basal ganglia/corona radiata  No acute hemorrhage or mass effect  Chronic microangiopathic changes  Chronic lacunar infarcts left basal ganglia/corona radiata  9/3  MRI brain=  Acute infarct within the right basal ganglia and corona radiata  No hemorrhagic transformation  Additional chronic microangiopathic change within the brain parenchyma including old lacunar infarcts  Small chronic microhemorrhages are scattered within the brain, possibly secondary to chronic hypertension  No acute hemorrhage  9/4  Cxr=  Developing minimal bibasilar opacity suspect for minimal pneumonia or atelectasis  9/5  Cxr=  ET tube absent  NG tube remains  Mild cardiomegaly  Mild vascular accentuation  Bibasilar clearing    Ekg=  Normal sinus rhythm with 1st degree A-V block with occasional Premature atrial complexes  Left bundle branch block    Medications:   acetylcysteine  3 mL Nebulization Q6H   amLODIPine  10 mg Oral Daily   aspirin  81 mg Oral Daily   atorvastatin  40 mg Per NG Tube Daily With Dinner   heparin (porcine)  5,000 Units Subcutaneous Q8H [address]   insulin lispro  2-12 Units Subcutaneous Q6H MARYAN   ipratropium  0 5 mg Nebulization Q6H   levalbuterol  1 25 mg Nebulization Q6H   pantoprazole  40 mg Intravenous Q24H [facility]   polyethylene glycol  17 g Oral Daily   senna  8 8 mg Oral HS     Continuous Infusions:  dexmedetomidine, 0 1-0 7 mcg/kg/hr, Last Rate: 0 2 mcg/kg/hr (09/04/21 1721)        PRN Meds:   albuterol, 2 5 mg, Q4H PRN  fentanyl citrate (PF), 50 mcg, Q1H PRN  hydrALAZINE, 10 mg, Q6H PRN  Labetalol HCl, 10 mg, Q4H PRN      Discharge Plan: tbd    Network Utilization Review Department  ATTENTION: Please call with any questions or concerns to [phone] and carefully listen to the prompts so that you are directed to the right person  All voicemails are confidential   Julio Burdick all requests for admission clinical reviews, approved or denied determinations and any other requests to dedicated fax number below belonging to the campus where the patient is receiving treatment   List of dedicated fax numbers for the Facilities:  [address] DENIALS (Administrative/Medical Necessity) [phone]   [address] (Maternity/NICU/Pediatrics) [phone]   [address] [place] [address] [address] [place] [address] [address] [place] [address] [address] [facility] [place] [address] [phone]

## 2021-09-07 NOTE — PROGRESS NOTES
Daily Progress Note - Critical Care   Yen Galvan 79 y o  male MRN: [id number]  Unit/Bed#: ICU 04 Encounter: [id number]        ----------------------------------------------------------------------------------------  HPI/24hr events:   79 y  o  male with HTN, current smoker x 50+ years, HLD, obesity, BPH, tracheomalacia initially presented to [facility] with acute hypoxic respiratory failure and syncope  Admitted for COPD exacerbation, started on steroids, nebs, and BiPAP  On 9/1 a stroke alert was called given acute left sided UE and LE weakness, left facial droop and dysarthria  Stat CTA head showed M2 occlusion of right MCA  Patient initiated on tPA and brought to unit  24 hour events:  · Patient sounds slightly better, suctioned less  · Not doing well overall, more lethargic  · Considering hospice   · glycopyrrolate given this AM    ---------------------------------------------------------------------------------------  SUBJECTIVE  Patient resting in bed this morning  He opens his eyes today  He continues to sounds like he is snoring even when awake  Continues to be very lethargic  RR also continues to be elevated at 44 this AM      Discussed with daughter yesterday that the way he is breathing is as if he is running a marathon and he will tire himself out  She stated that since he would not want to be connected to a breathing tube or feeding tube the rest of his life she is willing to consider hospice but would like to speak to palliative again about what this entails before moving forward      Review of Systems   Unable to perform ROS: Acuity of condition       ---------------------------------------------------------------------------------------  Assessment and Plan  Principal Problem:    Acute ischemic right MCA stroke [facility])  Active Problems:    Osteoarthritis of both hips    Essential hypertension    Mixed hyperlipidemia    Benign prostatic hyperplasia with post-void dribbling    Shortness of breath Fall    Elevated troponin    Tracheomalacia    Diarrhea    Sepsis (HCC)    COPD (chronic obstructive pulmonary disease) (HCC)    Left-sided weakness    Thromboembolic stroke ([facility] [address] )    Syncope    Acute respiratory failure ([facility] [address] )  Resolved Problems:    COPD with acute exacerbation (HCC)      Neuro:   · Acute Right M2 CVA  ? S/p tPA 9/1/21  ? Initial NIHSS: 12  ? ASA 81mg daily   ? Atorvastatin 40mg qHS  ? Q4h neuro checks     · Acute metabolic encephalopathy  ? Multifactorial related to CVA, possibly some component of delirium   ? Sleep/wake cycle regulation as able   § Melatonin 6mg qHS  ? CAM-ICU BID  · Sedation/analgesia   ? Precedex discontinued       CV:    Hypertension   o Maintain -160  o Maintain MAP > 65  o Continue amlodipine 10mg daily    o 8/31/21 Echo: EF 21%, grade 1 diastolic dysfunction   Hyperlipidemia   o Atorvastatin 40mg qHS       Pulm:   Acute hypoxic respiratory failure   o Intubated from 9/2/21 - 9/4/21   - BiPAP not tolerated well, discontinued  o Continue xopenex/atrovent q6h  o Mucomyst q6h x 4 doses  o Chest physiotherapy  o NT suction PRN   o Consider transition to HFNC if secretions continue to be a problem   o Imaging shows NG tube is not in esophagus, TF resumed 9/6  o Hold diuresis  o STAT CXR 9/5 largely unchanged with elevated left hemidiaphragm and small area of slight consolidation in RLL which is stable     o Maintain SpO2 >90%  o  Continue pulmonary hygiene  Incentive spirometer q1h while awake, encourage coughing and deep breathing   Upright positioning   o Consider steroids   o Gave one dose glycopyrrolate to help with secretions this AM  o If patient decompensates, do not intubate - now level 3 DNR/DNI   o Palliative following for goals of care discussions and possibly moving forward with hospice today   - Hospice consulted as well as requested by palliative    Emphysema/ suspected COPD  o No sign of acute exacerbation at this time  o Xopenex/atrovent q6h  o Start methylprednisolone if patient develops new wheezing    MIGUEL  o BiPAP discontinued  o Can consider HFNC       GI:    No acute issues  o LFT's WNL  o Normal abdominal exam    Stress ulcer prophylaxis  o Not indicated  o Discontinued IV Protonix    Bowel regimen  o Senna BID  o Miralax daily   o Add dulcolax suppository daily PRN  o Last BM: 9/6     :    BPH  o Baseline creatinine: 0 94 - 0 97  o Admission creatinine: 1 06  o Current creatinine: 0 79  o Holding home terazosin 5mg qHS   o Strict q4h I/O monitoring - 275 mL 24hr  o No gloria catheter  o Continue urinary retention protocol   o Continue to follow renal function tests    F/E/N:    Fluids  o Maintenance fluids: None   o Holding diuresis   Electrolytes   o Replete electrolytes with as needed to maintain K >4 0, Mag >2 0, Phos >3 0  - Phos and K repleted today   Nutrition  o Tube feeds resumed 9/6  - Per nutrition: Recommend increase Jevity 1 2 by 10 ml q 4 hrs as lane to goal rate of 66 ml/hr to provide qd: 1584ml, 1901 kcal,88 gm PRO,268 gm CHO,62 gm Fat,1278 ml Free H2O,1 45 mg CHO/kg/min  Adjust magnesium   Recommend 100 ml Free H2O flush q 4 hrs    o Consulted SLP to evaluate swallowing     Heme/Onc:    No acute issues   o hgb 14 4 today  o Transfuse for hemoglobin <7 0  o Transfuse for plts <15,000 or < 50,000 in the presence of bleeding    VTE prophylaxis:  SQH TID, SCD's to BLE    Endo/Rheum:    Prediabetes  o Last hemoglobin A1c 5 8% on 8/31/21  o Continue SSI  o Adjust insulin regimen as needed to maintain goal -180    ID:    Leukocytosis   o WBC 13 95 --> 10 17 --> 14 63 today  o Likely reactive from critical illness as well as leukemoid reaction from prior steroids  o Closely monitor for developing signs of pneumonia   o Procalcitonin: 0 08 (9/1/21)  - Can repeat if infection is suspected  o Continue to monitor fever and WBC curve off antibiotics     MSK/Skin:    Left hemiparesis   o Reposition q2h, eliminate pressure points while in bed  o Close skin surveillance   o PT/OT   o PM&R following   o Activity as tolerated     Disposition: Continue Critical Care   Patient appropriate for transfer out of the ICU today?: No  Code Status: Level 3 - DNAR and DNI  ---------------------------------------------------------------------------------------  Invasive Devices Review  Invasive Devices     Peripheral Intravenous Line            Peripheral IV 21 Right Forearm <1 day          Drain            NG/OG/Enteral Tube Nasogastric 14 Fr Left nare 5 days    External Urinary Catheter Small 1 day              Can any invasive devices be discontinued today? No  ---------------------------------------------------------------------------------------  OBJECTIVE    Vitals   Vitals:    21 0600 21 0700 21 0714 21 1100   BP: 157/67 162/70  158/69   BP Location:       Pulse: 76 76  82   Resp: (!) 39 (!) 44  (!) 41   Temp:       TempSrc:       SpO2: 94% 96% 96% 94%   Weight:       Height:         Temp (24hrs), Av 1 °F (37 3 °C), Min:98 9 °F (37 2 °C), Max:99 3 °F (37 4 °C)  Current: Temperature: 99 2 °F (37 3 °C)      BiPAP Settings  Non-Invasive Ventilation Mode: BiPAP  IPAP (cm): 16 cm  EPAP (cm): 6 cm  FiO2 (%): 40  Rate (Set): 16  Leak (lpm): 16  Total Rate: 32  Spontaneous Vt (mL): 505    Physical Exam  Vitals reviewed  Constitutional:       General: He is awake  Appearance: Normal appearance  He is obese  Interventions: Face mask in place  HENT:      Head: Normocephalic and atraumatic  Ears:      Comments: Hard of hearing     Mouth/Throat:      Lips: Pink  Mouth: Mucous membranes are dry  Eyes:      Pupils: Pupils are equal, round, and reactive to light  Cardiovascular:      Rate and Rhythm: Normal rate and regular rhythm  Heart sounds: Normal heart sounds  Pulmonary:      Effort: Tachypnea present  Comments: Upper airway transmitted noise  Abdominal:      General: Abdomen is protuberant  There is no distension  Tenderness: There is no abdominal tenderness  Genitourinary:     Comments: Condom catheter: clear, arleen urine   Musculoskeletal:      Cervical back: Full passive range of motion without pain  Right lower leg: No edema  Left lower leg: No edema  Skin:     General: Skin is warm and dry  Capillary Refill: Capillary refill takes less than 2 seconds  Neurological:      Mental Status: He is lethargic  Cranial Nerves: Facial asymmetry present  Motor: Weakness present  Comments: Left facial droop  1/5 movement in LUE in proximal muscle group, 0/5 hand   2/5 LLE  Moves foot to commands, no movement in quad muscle noted  3+/5 RLE  4/5 RUE    Psychiatric:         Behavior: Behavior is cooperative             Laboratory and Diagnostics:  Results from last 7 days   Lab Units 09/07/21  0432 09/06/21  0516 09/04/21  0435 09/03/21  0455 09/02/21  0457 09/01/21  1202 09/01/21  0627   WBC Thousand/uL 14 63* 11 34* 10 17* 13 95* 15 92*  --  10 83*   HEMOGLOBIN g/dL 14 4 14 5 13 2 12 7 14 5  --  14 7   I STAT HEMOGLOBIN g/dl  --   --   --   --   --  14 3  --    HEMATOCRIT % 46 2 45 2 41 5 40 3 44 1  --  45 1   HEMATOCRIT, ISTAT %  --   --   --   --   --  42  --    PLATELETS Thousands/uL 221 236 183 224 256  --  273   NEUTROS PCT % 82* 79*  --  90*  --   --   --    MONOS PCT % 12 12  --  6  --   --   --      Results from last 7 days   Lab Units 09/07/21  0432 09/06/21  0516 09/05/21  2149 09/05/21  0425 09/04/21  0435 09/03/21  0455 09/02/21  0457   SODIUM mmol/L 145 145 143 143 144 142 145   POTASSIUM mmol/L 4 3 3 9 3 4* 3 9 4 0 3 7 3 9   CHLORIDE mmol/L 113* 113* 110* 111* 114* 112* 113*   CO2 mmol/L 31 29 32 29 27 27 25   ANION GAP mmol/L 1* 3* 1* 3* 3* 3* 7   BUN mg/dL 36* 35* 34* 31* 33* 35* 37*   CREATININE mg/dL 0 91 0 79 0 85 0 73 0 74 0 89 0 98   CALCIUM mg/dL 8 2* 8 2* 7 8* 7 6* 7 6* 8 3 8 2*   GLUCOSE RANDOM mg/dL 145* 104 96 118 134 130 136   ALT U/L  -- --   --   --  20  --   --    AST U/L  --   --   --   --  25  --   --    ALK PHOS U/L  --   --   --   --  88  --   --    ALBUMIN g/dL  --   --   --   --  2 8*  --   --    TOTAL BILIRUBIN mg/dL  --   --   --   --  0 73  --   --      Results from last 7 days   Lab Units 09/06/21  0516 09/05/21  0425 09/04/21  0435 09/03/21  0455 09/02/21  0457   MAGNESIUM mg/dL 3 2* 2 6 2 9* 3 0* 2 8*   PHOSPHORUS mg/dL 2 7 3 0 2 3 2 5 2 9      Results from last 7 days   Lab Units 09/01/21  1250 09/01/21  1250   INR  1 08  --    PTT seconds  --  30      Results from last 7 days   Lab Units 09/01/21  1200 08/31/21  1904 08/31/21  1655   TROPONIN I ng/mL 0 05* 0 09* 0 12*         ABG:  Results from last 7 days   Lab Units 09/05/21  0426   PH ART  7 420   PCO2 ART mm Hg 42 3   PO2 ART mm Hg 145 6*   HCO3 ART mmol/L 26 8   BASE EXC ART mmol/L 2 1   ABG SOURCE  Radial, Right     VBG:  Results from last 7 days   Lab Units 09/05/21  0927 09/05/21  0426   PH JAMES  7 459*  --    PCO2 JAMES mm Hg 42 3  --    PO2 JAMES mm Hg 78 2*  --    HCO3 JAMES mmol/L 29 3  --    BASE EXC JAMES mmol/L 5 0  --    ABG SOURCE   --  Radial, Right     Results from last 7 days   Lab Units 09/07/21  0432 09/06/21  2243 09/01/21  1019 08/31/21  1555   PROCALCITONIN ng/ml 0 12 0 11 0 08 <0 05       Micro        EKG: Sinus bradycardia  Imaging: CXR: stable   I have personally reviewed pertinent reports  Intake and Output  I/O       09/03 0701 - 09/04 0700 09/04 0701 - 09/05 0700    I V  (mL/kg) 2480 3 (19 1) 549 3 (4 2)    NG/ 180    Feedings 626 320    Total Intake(mL/kg) 3306 3 (25 4) 1049 3 (8 1)    Urine (mL/kg/hr) 1355 (0 4) 4062 (1 3)    Emesis/NG output 200     Total Output 1555 4062    Net +1751 3 -3012 7          Unmeasured Urine Occurrence  1 x            Height and Weights   Height: 5' 10" (177 8 cm) (per 6/4/21 encounter)  IBW (Ideal Body Weight): 73 kg  Body mass index is 41 04 kg/m²    Weight (last 2 days)     None            Nutrition       Diet Orders (From admission, onward)             Start     Ordered    09/05/21 1916  Diet Enteral/Parenteral; Tube Feeding No Oral Diet; Jevity 1 2 Diaz; Continuous; 80; Prosource Protein Liquid - One Packet  Diet effective now     Question Answer Comment   Diet Type Enteral/Parenteral    Enteral/Parenteral Tube Feeding No Oral Diet    Tube Feeding Formula: Jevity 1 2 Diaz    Bolus/Cyclic/Continuous Continuous    Tube Feeding Goal Rate (mL/hr): 80    Prosource Protein Liquid - No Carb Prosource Protein Liquid - One Packet    RD to adjust diet per protocol?  Yes        09/05/21 1915                   Active Medications  Scheduled Meds:  Current Facility-Administered Medications   Medication Dose Route Frequency Provider Last Rate    albuterol  2 5 mg Nebulization Q4H PRN Cat Robles PA-C      amLODIPine  10 mg Oral Daily Mireya Gurrola MD      aspirin  81 mg Oral Daily Capkimber Monsivais, DO      atorvastatin  40 mg Per NG Tube Daily With [employer], DO      bisacodyl  10 mg Rectal Daily PRN LIYA Burgos      heparin (porcine)  5,000 Units Subcutaneous Q8H [address] Cassie Hinds, DO      hydrALAZINE  10 mg Intravenous Q6H PRN Mireya Gurrola MD      insulin lispro  2-12 Units Subcutaneous Q6H [address] Cassie Monsivais, DO      ipratropium  0 5 mg Nebulization Q6H LIYA Burgos      Labetalol HCl  10 mg Intravenous Q4H PRN Mireya Gurrola MD      levalbuterol  1 25 mg Nebulization Q6H LIYA Burgos      lisinopril  5 mg Oral Daily Cassie Hinds, DO      melatonin  6 mg Oral HS LIYA Burgos      polyethylene glycol  17 g Oral Daily LIYA Burgos      senna  8 8 mg Per NG Tube BID LIYA Guerrero       Continuous Infusions:     PRN Meds:   albuterol, 2 5 mg, Q4H PRN  bisacodyl, 10 mg, Daily PRN  hydrALAZINE, 10 mg, Q6H PRN  Labetalol HCl, 10 mg, Q4H PRN        Allergies   No Known Allergies  ---------------------------------------------------------------------------------------    Itzel Alva MD        Portions of the record may have been created with voice recognition software  Occasional wrong word or "sound a like" substitutions may have occurred due to the inherent limitations of voice recognition software    Read the chart carefully and recognize, using context, where substitutions have occurred

## 2021-09-07 NOTE — HOSPICE NOTE
I spoke to pt's daughter Mae Dick to evaluate for hospice services  Hospice benefits reviewed per Medicare guidelines and all questions answered  Dr Linda Poe approved for Inpatient level of care  Consents reviewed and signed by Mae Dick  Report given to [facility] charge nurse  Pt will need Bariatric bed which will be delivered at 12 noon 9/8/21  Per  [facility] transport arranged for 9/8/21 at 1230  Nurse informed to call hospice house with report 30 minutes prior to transport time  Reminded nurse ivs and catheters to be left in place and pt may be medicated prior to discharge for comfort during transfer

## 2021-09-07 NOTE — CASE MANAGEMENT
Consult received for hospice via case management  CM spoke with pt's Wei Sanchez ([phone]) via phone  Pt's daughter agreeable to speaking with [address] [place] [address] liaison to discuss hospice options for patient going forward  SL IPU referral placed in [facility]

## 2021-09-07 NOTE — PROGRESS NOTES
I have personally seen and examined patient and reviewed all data with resident  Agree with note, assessment and plan  Critical care time 32min   Please refer to attending comments below  Critical care time does not include procedures, family meeting or teaching  Please refer to critical care progress note completed by resident for full details on review of systems, medications and laboratory data  Right MCA acute stroke with M2 occlusion -status post tPA initial NIH 12 9/1/21  Acute hypoxic/ hypercarbic respiratory failure secondary to COPD exacerbation /pneumonia  Left pleural effusion  Tracheal malacia  Hypertension  -amlodipine and lisinopril  Hyperlipidemia  -continue statin  Obesity  BPH  Tobacco abuse/ nicotine dependence     BP goal: 120-160    Exam:  Tachypnea, sleepy, left facial droop, hard of hearing, patient will wiggle toes bilaterally, spontaneous movement appreciated with right upper extremity as well as right lower extremity, patient is nonverbal not participating with conversation     IO:-275 ml for past 24 hours, -1 3 L for hospital stay    There was an extensive discussion with patient's daughter and son-in-law regarding plan of care  Patient continues to have increased work of breathing yesterday with moist upper airway sounds  He does occasionally cough but does not appear to be adequately protecting his airway  Patient's daughter was informed that there is a strong likelihood patient will become fatigued if he continues along this path  The decision was made to not pursue any mechanical support with ventilation, no tracheostomy and patient is a DNR 3  Transitioning to hospice/comfort measures only was discussed and patient's family did appear to be interested in pursuing that route  They wish to have further discussions with palliative / hospice in regards to where the patient could be cared for as well as the transition    Hospice was consulted yesterday and plan for follow-up discussions today  Patient continued on supportive care overnight until plans can be confirmed for his goals of care today  Regarding stroke management, continue with neurologic q 4 hours  Repeat imaging at 24 hours status post tPA  MRI  completed  Plan to initiate anti-platelet therapy with  Aspirin 81 mg   Statin medication ordered  Continue with stroke protocol with PT/OT and speech evaluation  HbA1C, lipid panel and echo ordered  Neurology consult       Pending discussions with family regarding aggressiveness of care,  Consider reimage if patient has decline in neurologic exam or < GCS by 2 points or more  Discussed with Ehsan Wright transition to DNR 4 comfort case      Dr Boateng Keto aware and discussing with family and will clarify with case management

## 2021-09-07 NOTE — OCCUPATIONAL THERAPY NOTE
OT CANCEL NOTE    OT orders received  Chart reviewed  Pt is planning to transition to hospice care  No immediate acute skilled OT needs at this time  Will D/C OT  Thanks          09/07/21 2250   Note Type   Cancel Reasons Medical status       Rosibel Fitch MS, OTR/L

## 2021-09-07 NOTE — SPEECH THERAPY NOTE
Speech Language/Pathology  Speech/Language Pathology  Screen    Patient Name: Dillon Tolbert  SLJBX'R Date: 9/7/2021     Problem List  Principal Problem:    Acute ischemic right MCA stroke [facility])  Active Problems:    Osteoarthritis of both hips    Essential hypertension    Mixed hyperlipidemia    Benign prostatic hyperplasia with post-void dribbling    Shortness of breath    Fall    Elevated troponin    Tracheomalacia    Diarrhea    Sepsis ([facility] )    COPD (chronic obstructive pulmonary disease) ([facility] )    Left-sided weakness    Thromboembolic stroke ([facility] )    Syncope    Acute respiratory failure ([facility] )    Past Medical History  History reviewed  No pertinent past medical history  Past Surgical History  Past Surgical History:   Procedure Laterality Date    KNEE SURGERY          Bedside Swallow Evaluation:    Summary:  Order received, chart reviewed  Pt now w/ Right MCA acute stroke with M2 occlusion -status post tPA, poor secretion management following extubation  Family desires comfort measures/hospice at this time  Please offer pt po he desires or requests, no formal assessment at this time  HPI/24hr events:  79 y  o  male with HTN, current smoker x 50+ years, HLD, obesity, BPH initially presented to [facility] with acute hypoxic respiratory failure and syncope  Admitted for COPD exacerbation, started on steroids, nebs, and BiPAP  Did have non-MI troponin elevation on admission which has since trended down  Echo done yesterday with some grade I diastolic dysfunction  Patient no acutely volume overloaded on exam or imaging  CT chest with airspace disease, but no acute consolidations  Tracheomalacia also present      On 9/1 a stroke alert was called given acute left sided UE and LE weakness, left facial droop and dysarthria  No prior history of stroke  Stat CTA head showed M2 occlusion of right MCA along with multiple sites of intracranial atherosclerosis along right A2, M1 and M3   Patient initiated on tPA and brought to unit      Deficits: Can answer yes/no questions appropriately and follow commands  Has 1/5 strength on LLE, 0/5 on LUE  Left facial droop  Has lots of thick, loose oral secretions and gurgling cough  · MRI Brain demonstrated rather sizable acute infarct in the right basal ganglia and corona radiata, chronic left lacunar infarcts, and small chronic microhemorrhages scattered throughout the hemispheres, possibly secondary to chronic hypertension  Concern for possible CAA  · Pt intubated following rapid response on 9/2/2021  Extubated 9/4/2021

## 2021-09-07 NOTE — PLAN OF CARE
Problem: Potential for Falls  Goal: Patient will remain free of falls  Description: INTERVENTIONS:  - Educate patient/family on patient safety including physical limitations  - Instruct patient to call for assistance with activity   - Consult OT/PT to assist with strengthening/mobility   - Keep Call bell within reach  - Keep bed low and locked with side rails adjusted as appropriate  - Keep care items and personal belongings within reach  - Initiate and maintain comfort rounds  - Make Fall Risk Sign visible to staff  - Offer Toileting every 2 Hours, in advance of need  - Initiate/Maintain 2alarm  - Obtain necessary fall risk management equipment: 2  - Apply yellow socks and bracelet for high fall risk patients  - Consider moving patient to room near nurses station  Outcome: Progressing

## 2021-09-07 NOTE — PROGRESS NOTES
Progress Note - Neurology   Finn Dayton 79 y o  male MRN: [id number]  Unit/Bed#: ICU 04 Encounter: [id number]    Addendum -  Spoke with Dr Tanvir Spence and ICU team, no need for CRYSTAL, depending on respiratory status and ICU progress, can consider a loop recorder for further evaluation in regards to his stroke  Neurology will continue to follow  Assessment/Plan   Left-sided weakness  Assessment & Plan  78-year-old male with HTN, HLD, tobacco abuse, COPD, BPH who [facility] following a fall at home  Stroke alert called on 09/01/2021 for new onset left upper and lower extremity weakness  NIHSS 12  LUE/LLE and left facial weakness, and severely dysarthric with a right gaze preference  CT head unremarkable for acute intracranial abnormalities  CTA head neck demonstrated significant stenosis of right M1, M2, and distal M3, and right A2  Radiologist noted right M2 occlusion  Patient felt not to be an IR candidate per neurosurgery but was given IV tPA  · Repeat CT head: stable with no acute hemorrhage  · Echocardiogram: LVEF 55%, left atrium mildly dilated  Mild to moderate MAC  · MRI Brain demonstrated rather sizable acute infarct in the right basal ganglia and corona radiata, chronic left lacunar infarcts, and small chronic microhemorrhages scattered throughout the hemispheres, possibly secondary to chronic hypertension  Concern for possible CAA  · , A1c 5 8  Stroke etiology felt to be proximal embolic given size of infarct, versus small vessel  Plan:  - Per ICU notes CRYSTAL not recommended with respiratory status and DNR status  - telemetry monitoring   - goal normotension, euglycemia  - continue ASA 81 mg daily  - continue atorvastatin 40 mg daily  - PT/OT/SL  - Frequent neuro checks, STAT CT Head for acute changes, and notify Neurology    Shortness of breath  Assessment & Plan    - ongoing [address] discussion with family per Critical Care    Management per ICU team     Essential hypertension  Assessment & Plan  - goal normotension  - Management per ICU team     Marcelino Severino will need follow up in in 6 weeks with neurovascular attending or advance practitioner  He will not require outpatient neurological testing  Subjective:   Neurology follow up reviewed ICU notes  Follow up in regards to stroke and further work up, CRYSTAL on hold with respiratory status and DNR status  Per ICU notes patient is resting and appeared comfortable  this a m , receiving respiratory therapy, the patient is now level 3 code status, has PACs  ROS:  Limited  Vitals: Blood pressure 162/70, pulse 76, temperature 99 2 °F (37 3 °C), temperature source Rectal, resp  rate (!) 44, height 5' 10" (1 778 m), weight 130 kg (286 lb), SpO2 96 %  ,Body mass index is 41 04 kg/m²          Current Facility-Administered Medications   Medication Dose Route Frequency Provider Last Rate    albuterol  2 5 mg Nebulization Q4H PRN Cat Robles PA-C      amLODIPine  10 mg Oral Daily Eduar Gifford MD      aspirin  81 mg Oral Daily Cassie Monsivais DO      atorvastatin  40 mg Per NG Tube Daily [employer], DO      bisacodyl  10 mg Rectal Daily PRN LIYA Morelos      heparin (porcine)  5,000 Units Subcutaneous Q8H [address] Cassie Monsivais DO      hydrALAZINE  10 mg Intravenous Q6H PRN Eduar Gifford MD      insulin lispro  2-12 Units Subcutaneous Q6H [address] Cassie Monsivais DO      ipratropium  0 5 mg Nebulization Q6H LIYA Morelos      Labetalol HCl  10 mg Intravenous Q4H PRN Eduar Gifford MD      levalbuterol  1 25 mg Nebulization Q6H LIYA Morelos      lisinopril  5 mg Oral Daily Cassie Hinds, DO      melatonin  6 mg Oral HS LIYA Morelos      polyethylene glycol  17 g Oral Daily LIYA Morelos      senna  8 8 mg Per NG Tube BID LIYA Morelos         Physical Exam  Neurological    Mental status - the patient is awake, sleepy at times, not consistently following commands, mumbling unintelligible, does have NG tube in place, higher MS cannot be assessed at this time  Cranial nerves 2 through 12 are intact, eyes midline, blinks to threat bilaterally, ? Mild facial asymetry on the left  Motor - Left arm 0-1/5, right arm is fully antigravity  Noxious stimulation withdrawals right greater than left    No tremor observed  Sensation - grimaces and withdrawals on the right upper and lower, no grimace on the left upper, withdrawal left lower  Coordination -  Difficult to assess    Toes are up on the left, right is down  No evidence of seizure activity, observed  Lab, Imaging and other studies:   I have personally reviewed pertinent reports    , CBC:   Results from last 7 days   Lab Units 09/07/21  0432 09/06/21  0516 09/04/21  0435   WBC Thousand/uL 14 63* 11 34* 10 17*   RBC Million/uL 4 88 4 77 4 41   HEMOGLOBIN g/dL 14 4 14 5 13 2   HEMATOCRIT % 46 2 45 2 41 5   MCV fL 95 95 94   PLATELETS Thousands/uL 221 236 183   , BMP/CMP:   Results from last 7 days   Lab Units 09/07/21  0432 09/06/21  0516 09/05/21  2149 09/04/21  0435 09/01/21  1202   SODIUM mmol/L 145 145 143 144  --    POTASSIUM mmol/L 4 3 3 9 3 4* 4 0  --    CHLORIDE mmol/L 113* 113* 110* 114*  --    CO2 mmol/L 31 29 32 27  --    CO2, I-STAT mmol/L  --   --   --   --  28   BUN mg/dL 36* 35* 34* 33*  --    CREATININE mg/dL 0 91 0 79 0 85 0 74  --    GLUCOSE, ISTAT mg/dl  --   --   --   --  131   CALCIUM mg/dL 8 2* 8 2* 7 8* 7 6*  --    AST U/L  --   --   --  25  --    ALT U/L  --   --   --  20  --    ALK PHOS U/L  --   --   --  88  --    EGFR ml/min/1 73sq m 80 85 83 88  --    , Vitamin B12:   , HgBA1C:   , TSH:   , Coagulation:   Results from last 7 days   Lab Units 09/01/21  1250   INR  1 08   , Lipid Profile:   , Ammonia:   , Urinalysis:       Invalid input(s): URIBILINOGEN, Drug Screen:   , Medication Drug Levels: Invalid input(s): CARBAMAZEPINE,  PHENOBARB, LACOSAMIDE, OXCARBAZEPINE     Procedure: XR chest portable ICU    Result Date: 9/6/2021  Narrative: CHEST INDICATION:   acute hypoxia related to secretions  COMPARISON:  9/4/2021 EXAM PERFORMED/VIEWS:  XR CHEST PORTABLE ICU FINDINGS: Cardiomediastinal silhouette appears mildly enlarged with mild vascular accentuation  ET tube absent  NG tube remains  Lung bases are clearing  Floyd Glass No pneumothorax or pleural effusion  Osseous structures appear within normal limits for patient age  Impression: ET tube absent  NG tube remains  Mild cardiomegaly  Mild vascular accentuation Bibasilar clearing  Workstation performed: [id number]     Counseling / Coordination of Care  Reviewed with attending plan of care, plan of care per attending physician, reviewed with ICU team at morning ICU rounds

## 2021-09-07 NOTE — HOSPICE NOTE
Received hospice referral   Evaluated pt, resting quietly in bed  Has not received any IV medications for pain, restlessness or agitation  Not Inpatient hospice appropriate at this time  RODRI Rutherford updated  Will continue to follow until discharge

## 2021-09-07 NOTE — CASE MANAGEMENT
Pt reevaluated and approved for [facility]  Per liaison Arabella, [facility] tomorrow at [facility]  CM arranged transportation via [employer] ([phone]) for BLS w/ 12:30PM pickup for tomorrow  CM left VM for pt's sakina Christine Brine providing update of the same  CM left name, role, and contact information

## 2021-09-08 NOTE — CASE MANAGEMENT
CM received phone call from pt's Sherrill Agudelo ([phone])  Xenia in agreement of transfer to [facility] at time of 12:30PM today  Orie Jewels states to have no additional d/c concerns, needs, and questions at this time

## 2021-09-08 NOTE — PROGRESS NOTES
Progress note - Palliative and Supportive Care   Libby Fowler 79 y o  male [id number]    Patient Active Problem List   Diagnosis    Osteoarthritis of both hips    Essential hypertension    Mixed hyperlipidemia    Benign colon polyp    Health care maintenance    Benign prostatic hyperplasia with post-void dribbling    Acute cystitis without hematuria    Hypocalcemia    Alkaline phosphatase elevation    Shortness of breath    Fall    Elevated troponin    Tracheomalacia    Diarrhea    Sepsis (HCC)    COPD (chronic obstructive pulmonary disease) (HCC)    Left-sided weakness    Thromboembolic stroke (HCC)    Syncope    Acute respiratory failure (HCC)    Acute ischemic right MCA stroke (HCC)     Active issues specifically addressed today include:   Right basal ganglia/M2 ischemic stroke s/p tPA  Encephalopathy  Acute hypoxemic respiratory failure   Comfort measures only status    Plan:  1  Symptom management -    - d/c IV fentanyl   - dilaudid 1mg IV Q10 minutes PRN pain or increased work of breathing   - ativan 1mg IV Q1H PRN anxiety or increased work of breathing   - robinul 0 1mg IV Q1H PRN secretions   - scopolamine patch for secretions    2  Goals - level 4 comfort care   - Planned for discharge to hospice IPU at 12:30  Comfort measures only while admitted  - Addendum: patient  prior to transport to hospice IPU  Code Status: comfort - Level 4   Decisional apparatus:  Patient is not competent on my exam today  If competence is lost, patient's substitute decision maker would default to daughter by [place] 169  Advance Directive / Living Will / POLST:  None on file    Interval history:       No events overnight  Patient received 4 PRN doses of fentanyl since transition to comfort care yesterday afternoon  Palliative was paged secondary to concern of tachypnea and secretions  Patient was examined at which time his respiratory rate was 60, struggling with secretions   RN attempted to suction prior, but patient clamped down  Attempted to reposition head to the side, but patient was rigid  Medication changes made as above  MEDICATIONS / ALLERGIES:     all current active meds have been reviewed    No Known Allergies    OBJECTIVE:    Physical Exam  Physical Exam  Constitutional:       General: He is in acute distress  Appearance: He is ill-appearing  HENT:      Head: Normocephalic and atraumatic  Eyes:      Conjunctiva/sclera: Conjunctivae normal    Cardiovascular:      Rate and Rhythm: Normal rate  Pulmonary:      Comments: Tachypnea, RR 60  Abdominal:      General: There is no distension  Tenderness: There is no guarding  Genitourinary:     Comments: Urinary catheter in place  Musculoskeletal:         General: No swelling  Skin:     General: Skin is warm and dry  Comments: No mottling   Neurological:      Comments: Opens eyes briefly but otherwise unresponsive   Psychiatric:      Comments: No restlessness         Lab Results: no new  Imaging Studies: no new  EKG, Pathology, and Other Studies: no new    Counseling / Coordination of Care    Total floor / unit time spent today 25+ minutes  Greater than 50% of total time was spent with the patient and / or family counseling and / or coordination of care  A description of the counseling / coordination of care: time spent assessing patient, communicating with RN, primary team, complex symptom management

## 2021-09-08 NOTE — TRANSPORTATION MEDICAL NECESSITY
Section I - General Information    Name of Patient: Duong Nunez                 : 1940    Medicare #: [id number]  Transport Date: 21 (PCS is valid for round trips on this date and for all repetitive trips in the 60-day range as noted below )  Origin: [address] [facility]                                                         Destination:  [facility] hospice unit, [facility]  Is the pt's stay covered under Medicare Part A (PPS/DRG)   []     Closest appropriate facility? If no, why is transport to more distant facility required? Yes  If hospice pt, is this transport related to pt's terminal illness? Yes       Section II - Medical Necessity Questionnaire  Ambulance transportation is medically necessary only if other means of transport are contraindicated or would be potentially harmful to the patient  To meet this requirement, the patient must either be "bed confined" or suffer from a condition such that transport by means other than ambulance is contraindicated by the patient's condition  The following questions must be answered by the medical professional signing below for this form to be valid:    1)  Describe the MEDICAL CONDITION (physical and/or mental) of this patient AT [address] [place] [address] that requires the patient to be transported in an ambulance and why transport by other means is contraindicated by the patient's condition: fall, safety risk, end of life patient, lethargic    2) Is the patient "bed confined" as defined below? Yes  To be "be confined" the patient must satisfy all three of the following conditions: (1) unable to get up from bed without Assistance; AND (2) unable to ambulate; AND (3) unable to sit in a chair or wheelchair  3) Can this patient safely be transported by car or wheelchair van (i e , seated during transport without a medical attendant or monitoring)?    No    4) In addition to completing questions 1-3 above, please check any of the following conditions that apply*:   *Note: supporting documentation for any boxes checked must be maintained in the patient's medical records  If hosp-hosp transfer, describe services needed at 2nd facility not available at 1st facility? Patient is confused  Patient is comatose  Moderate/severe pain on movement   Unable to tolerate seated position for time needed to transport   Other(specify) fall, safety risk      Section III - Signature of Physician or Healthcare Professional  I certify that the above information is true and correct based on my evaluation of this patient, and represent that the patient requires transport by ambulance and that other forms of transport are contraindicated  I understand that this information will be used by the Centers for Medicare and Medicaid Services (CMS) to support the determination of medical necessity for ambulance services, and I represent that I have personal knowledge of the patient's condition at time of transport  []  If this box is checked, I also certify that the patient is physically or mentally incapable of signing the ambulance service's claim and that the institution with which I am affiliated has furnished care, services, or assistance to the patient  My signature below is made on behalf of the patient pursuant to 42 CFR §424 36(b)(4)  In accordance with 42 CFR §424 37, the specific reason(s) that the patient is physically or mentally incapable of signing the claim form is as follows:  Mathew Rios of Physician* or Healthcare Professional______________________________________________________________  Signature Date 09/08/21 (For scheduled repetitive transports, this form is not valid for transports performed more than 60 days after this date)    Printed Name & Credentials of Physician or Healthcare Professional (MD, DO, RN, etc )___________Kathleen Nelson RN_____________________  *Form must be signed by patient's attending physician for scheduled, repetitive transports   For non-repetitive, unscheduled ambulance transports, if unable to obtain the signature of the attending physician, any of the following may sign (choose appropriate option below)  [] Physician Assistant []  Clinical Nurse Specialist [x]  Registered Nurse  []  Nurse Practitioner  [x] Discharge Planner

## 2021-09-08 NOTE — DEATH NOTE
INPATIENT DEATH NOTE  Cory Galeano 79 y o  male MRN: [id number]  Unit/Bed#: [facility] 623-01 Encounter: [id number]         Patient's Information  Pronounced by: RAFITA Gould  Did the patient's death occur in the ED?: No  Did the patient's death occur in the OR?: No     Date and time of death: 2021 at 18  Cause of death: respiratory failure  Next of kin notified: Daughter, Ralf Norton, by phone at 80    PHYSICAL EXAM:  Unresponsive to noxious stimuli, Spontaneous respirations absent, Breath sounds absent, Carotid pulse absent, Heart sounds absent, Pupillary light reflex absent and Corneal blink reflex absent    Medical Examiner notification criteria:  NONE APPLICABLE   Medical Examiner's office notified?:  Yes, by RN  Medical Examiner accepted case?:  No  Name of Medical Examiner: Lorena Tello    Autopsy Options:  Decision for post-mortem examination not yet made by next of kin      Primary Service Attending Physician notified?:  yes - Attending:  Dr Maegan Mai  Physician/Resident responsible for completing Discharge Summary:  Petey Allen MD  PGY1

## 2021-09-08 NOTE — DISCHARGE SUMMARY
ICU DISCHARGE SUMMARY     Debbie Montano   79 y o  male  MRN: [id number]  Room/Bed: [place] [address]    Encounter: [id number]    Principal Problem:    Acute ischemic right MCA stroke [facility])  Active Problems:    Osteoarthritis of both hips    Essential hypertension    Mixed hyperlipidemia    Benign prostatic hyperplasia with post-void dribbling    Shortness of breath    Fall    Elevated troponin    Tracheomalacia    Diarrhea    Sepsis (HCC)    COPD (chronic obstructive pulmonary disease) ([facility])    Left-sided weakness    Thromboembolic stroke ([facility] )    Syncope    Ac[facility] respiratory failure ([facility] )      No new Assessment & Plan notes have been filed under this hospital service since the last note was generated  Service: Critical Care/ICU      DETAILS OF HOSPITAL COURSE     79 y  o  male with HTN, current smoker x 50+ years, HLD, obesity, BPH, and tracheomalacia initially presented to [facility] with acute hypoxic respiratory failure and syncope  Admitted for COPD exacerbation, started on steroids, nebs, and BiPAP  On 9/1 a stroke alert was called given acute left sided UE and LE weakness, left facial droop and dysarthria  Stat CTA head showed M2 occlusion of right MCA  Patient initiated on tPA and brought to unit  On 9/2 patient started having increased work of breathing and needed to be intubated after discussing with his daughter Ehsan Wright  He was on tube feeds  Palliative was consulted and family meeting was held on 9/4  Determined that his daughter Ehsan Wright draws the line at her father needing to be hooked up to a machine for breathing or needing a feeding tube for the rest of his life  Patient was then extubated and started on BiPap  He did not tolerate this too well  He continued to have increased work of breathing and excessive secretions  At this point we had reached the line Ehsan Wright had drawn  He was made level 3 DNR/DNI and was not re-intubated   Patient continued to have difficulty breathing and was becoming more lethargic  Discussed with Laura Ospina that the way he is breathing is as if he is running a marathon and he will tire himself out soon  She did not want him to suffer any longer and decided on hospice  Patient was changed to level 4 comfort care  Hospice was consulted  Feeding tube was removed and patient was made more comfortable with medications including fentanyl  He was also given glycopyrrolate to help dry up the secretions that were making him uncomfortable  On exam this morning patient's work of breathing seemed to have increased and he looked even more fatigued  Plan was to transport to inpatient hospice house at 12:30pm  Palliative saw patient this morning and prescribed dilaudid, ativan, robinul, and scopolamine patch to relieve any pain and help with increased work of breathing  Notified that patient had passed around 11:45am prior to transport to hospice  Called his daughter Laura Ospina to inform her that her father had passed         Physical Exam  Patient         DISCHARGE INFORMATION     Admitting Provider: Liz Oviedo MD  Admission Date: 2021    Discharge Provider: Zach Campo DO  Discharge Date: 2021    Discharge Disposition:     Discharge Diagnoses:  Principal Problem:    Acute ischemic right MCA stroke [facility])  Active Problems:    Osteoarthritis of both hips    Essential hypertension    Mixed hyperlipidemia    Benign prostatic hyperplasia with post-void dribbling    Shortness of breath    Fall    Elevated troponin    Tracheomalacia    Diarrhea    Sepsis ([facility] )    COPD (chronic obstructive pulmonary disease) (HCC)    Left-sided weakness    Thromboembolic stroke ([facility] )    Syncope    Ac[facility] failure ([facility] )  Resolved Problems:    COPD with acute exacerbation [facility])      Consulting Providers:  Palliative  Neurology  PM&R    Diagnostic & Therapeutic Procedures Performed:  XR chest 1 view portable    Result Date: 8/31/2021  Impression: No focal consolidation, pleural effusion, or pneumothorax  Workstation performed: [id number]     CT head wo contrast    Result Date: 9/2/2021  Impression: Acute infarct in the right basal ganglia/corona radiata  No acute hemorrhage or mass effect  Chronic microangiopathic changes  Chronic lacunar infarcts left basal ganglia/corona radiata  The study was marked in EPIC for significant notification  Workstation performed: [id number]     CT head without contrast    Result Date: 8/31/2021  Impression: No acute intracranial abnormality  Microangiopathic changes  Workstation performed: [id number]     CT spine cervical without contrast    Result Date: 8/31/2021  Impression: No cervical spine fracture or traumatic malalignment  Workstation performed: [id number]     MRI brain wo contrast    Result Date: 9/3/2021  Impression: Acute infarct within the right basal ganglia and corona radiata  No hemorrhagic transformation  Additional chronic microangiopathic change within the brain parenchyma including old lacunar infarcts  Small chronic microhemorrhages are scattered within the brain, possibly secondary to chronic hypertension  No acute hemorrhage  This examination was marked "immediate notification" in Epic in order to begin the standard process by which the radiology reading room liaison alerts the referring practitioner  Workstation performed: [id number]     CT stroke alert brain    Result Date: 9/1/2021  Impression: No acute intracranial abnormality  Microangiopathic changes  Chronic lacunar infarcts left lentiform nucleus and centrum semiovale  I personally discussed this study with Dr Suhas Smith on 9/1/2021 at 12:22 PM  Workstation performed: [id number]     XR chest portable ICU    Result Date: 9/6/2021  Impression: ET tube absent  NG tube remains  Mild cardiomegaly  Mild vascular accentuation Bibasilar clearing   Workstation performed: [id number]     XR chest portable ICU    Result Date: 9/5/2021  Impression: Developing minimal bibasilar opacity suspect for minimal pneumonia or atelectasis  The study was marked in [facility] for immediate notification  Workstation performed: [id number]     XR chest portable ICU    Result Date: 9/2/2021  Impression: Life-support tubes as above  Mild elevation left diaphragm without acute cardiopulmonary disease  Workstation performed: [id number]     XR chest portable ICU    Result Date: 9/2/2021  Impression: No definite acute cardiopulmonary disease on this study slightly limited by low lung volumes  Workstation performed: [id number]     CTA ED chest PE Study    Result Date: 8/31/2021  Impression: 1  Suboptimal opacification of the distal pulmonary arteries  No gross central pulmonary embolus is seen  2   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  Workstation performed: [id number]     CTA stroke alert (head/neck)    Result Date: 9/1/2021  Impression: 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 series 3 image 166  Multiple sites of intracranial atherosclerosis with stenosis along the right A2, right M1, and distal M3 on the right   I personally discussed this study with Dr Wild Collado on 9/1/2021 at 12:31 PM  Workstation performed: [id number]       Code Status: Level 4 - Comfort Care      Allergies:  No Known Allergies    ==  Cheri Mancilla MD  [address]  Internal Medicine Resident PGY-1

## 2021-09-09 NOTE — UTILIZATION REVIEW
Notification of Discharge   This is a Notification of Discharge from our facility [address]  Please be advised that this patient has been discharge from our facility  Below you will find the admission and discharge date and time including the patients disposition  UTILIZATION REVIEW CONTACT:  Leticia Pacheco  Utilization   Network Utilization Review Department  Phone: [phone] x carefully listen to the prompts  All voicemails are confidential   Email: [email or web address]     PHYSICIAN ADVISORY SERVICES:  FOR [id number] REVIEW - MEDICAL NECESSITY DENIAL  Phone: [phone]  Fax: [phone]  Email: [email or web address]     PRESENTATION DATE: 2021  7:09 AM  OBERVATION ADMISSION DATE:   INPATIENT ADMISSION DATE: 21 11:42 AM   DISCHARGE DATE: 2021  3:08 PM  DISPOSITION:        IMPORTANT INFORMATION:  Send all requests for admission clinical reviews, approved or denied determinations and any other requests to dedicated fax number below belonging to the campus where the patient is receiving treatment   List of dedicated fax numbers:  [address] DENIALS (Administrative/Medical Necessity) [phone]   [address]  (Maternity/NICU/Pediatrics) [phone]   [place] [phone]   Purvi Payne [phone]   Yaw Adkins [phone]   Claudia Henderson [facility] [address] [facility] [phone]   [facility]  [phone]   [address] [address], [address]  [address] [facility] [address] [facility] [phone]